# Patient Record
Sex: MALE | Race: WHITE | NOT HISPANIC OR LATINO
[De-identification: names, ages, dates, MRNs, and addresses within clinical notes are randomized per-mention and may not be internally consistent; named-entity substitution may affect disease eponyms.]

---

## 2017-06-13 ENCOUNTER — APPOINTMENT (OUTPATIENT)
Dept: ULTRASOUND IMAGING | Facility: CLINIC | Age: 62
End: 2017-06-13

## 2017-06-17 ENCOUNTER — APPOINTMENT (OUTPATIENT)
Dept: ULTRASOUND IMAGING | Facility: CLINIC | Age: 62
End: 2017-06-17

## 2017-06-17 ENCOUNTER — OUTPATIENT (OUTPATIENT)
Dept: OUTPATIENT SERVICES | Facility: HOSPITAL | Age: 62
LOS: 1 days | End: 2017-06-17
Payer: COMMERCIAL

## 2017-06-17 DIAGNOSIS — Z00.8 ENCOUNTER FOR OTHER GENERAL EXAMINATION: ICD-10-CM

## 2017-06-17 PROCEDURE — 76775 US EXAM ABDO BACK WALL LIM: CPT

## 2017-06-17 PROCEDURE — 76770 US EXAM ABDO BACK WALL COMP: CPT

## 2017-12-27 ENCOUNTER — APPOINTMENT (OUTPATIENT)
Dept: MRI IMAGING | Facility: CLINIC | Age: 62
End: 2017-12-27
Payer: COMMERCIAL

## 2017-12-27 ENCOUNTER — OUTPATIENT (OUTPATIENT)
Dept: OUTPATIENT SERVICES | Facility: HOSPITAL | Age: 62
LOS: 1 days | End: 2017-12-27
Payer: COMMERCIAL

## 2017-12-27 DIAGNOSIS — Z00.8 ENCOUNTER FOR OTHER GENERAL EXAMINATION: ICD-10-CM

## 2017-12-27 PROCEDURE — 72148 MRI LUMBAR SPINE W/O DYE: CPT

## 2017-12-27 PROCEDURE — 72148 MRI LUMBAR SPINE W/O DYE: CPT | Mod: 26

## 2018-01-30 ENCOUNTER — OUTPATIENT (OUTPATIENT)
Dept: OUTPATIENT SERVICES | Facility: HOSPITAL | Age: 63
LOS: 1 days | End: 2018-01-30
Payer: COMMERCIAL

## 2018-01-30 ENCOUNTER — APPOINTMENT (OUTPATIENT)
Dept: RADIOLOGY | Facility: CLINIC | Age: 63
End: 2018-01-30
Payer: COMMERCIAL

## 2018-01-30 DIAGNOSIS — Z00.8 ENCOUNTER FOR OTHER GENERAL EXAMINATION: ICD-10-CM

## 2018-01-30 PROCEDURE — 71046 X-RAY EXAM CHEST 2 VIEWS: CPT | Mod: 26

## 2018-01-30 PROCEDURE — 71046 X-RAY EXAM CHEST 2 VIEWS: CPT

## 2018-06-27 ENCOUNTER — APPOINTMENT (OUTPATIENT)
Dept: DERMATOLOGY | Facility: CLINIC | Age: 63
End: 2018-06-27
Payer: COMMERCIAL

## 2018-06-27 VITALS — HEIGHT: 72 IN | BODY MASS INDEX: 31.83 KG/M2 | WEIGHT: 235 LBS

## 2018-06-27 DIAGNOSIS — Z86.39 PERSONAL HISTORY OF OTHER ENDOCRINE, NUTRITIONAL AND METABOLIC DISEASE: ICD-10-CM

## 2018-06-27 DIAGNOSIS — Z84.0 FAMILY HISTORY OF DISEASES OF THE SKIN AND SUBCUTANEOUS TISSUE: ICD-10-CM

## 2018-06-27 PROCEDURE — 99203 OFFICE O/P NEW LOW 30 MIN: CPT | Mod: 25

## 2018-06-27 PROCEDURE — 11100 BX SKIN SUBCUTANEOUS&/MUCOUS MEMBRANE 1 LESION: CPT

## 2018-06-27 RX ORDER — ATORVASTATIN CALCIUM 10 MG/1
10 TABLET, FILM COATED ORAL
Qty: 90 | Refills: 0 | Status: ACTIVE | COMMUNITY
Start: 2017-09-30

## 2018-06-27 RX ORDER — FENOFIBRATE 130 MG/1
130 CAPSULE ORAL
Qty: 90 | Refills: 0 | Status: ACTIVE | COMMUNITY
Start: 2018-03-10

## 2018-07-25 LAB — CORE LAB BIOPSY: NORMAL

## 2018-09-11 ENCOUNTER — RX RENEWAL (OUTPATIENT)
Age: 63
End: 2018-09-11

## 2018-09-26 ENCOUNTER — RX RENEWAL (OUTPATIENT)
Age: 63
End: 2018-09-26

## 2018-10-18 ENCOUNTER — RECORD ABSTRACTING (OUTPATIENT)
Age: 63
End: 2018-10-18

## 2018-10-18 RX ORDER — ADHESIVE TAPE 3"X 2.3 YD
50 MCG TAPE, NON-MEDICATED TOPICAL DAILY
Refills: 0 | Status: ACTIVE | COMMUNITY

## 2018-10-20 ENCOUNTER — LABORATORY RESULT (OUTPATIENT)
Age: 63
End: 2018-10-20

## 2018-10-23 ENCOUNTER — APPOINTMENT (OUTPATIENT)
Dept: ENDOCRINOLOGY | Facility: CLINIC | Age: 63
End: 2018-10-23
Payer: COMMERCIAL

## 2018-10-23 VITALS
HEIGHT: 72 IN | HEART RATE: 68 BPM | DIASTOLIC BLOOD PRESSURE: 80 MMHG | BODY MASS INDEX: 31.69 KG/M2 | SYSTOLIC BLOOD PRESSURE: 112 MMHG | WEIGHT: 234 LBS

## 2018-10-23 PROCEDURE — 99214 OFFICE O/P EST MOD 30 MIN: CPT

## 2018-12-17 ENCOUNTER — RX RENEWAL (OUTPATIENT)
Age: 63
End: 2018-12-17

## 2019-02-06 ENCOUNTER — RECORD ABSTRACTING (OUTPATIENT)
Age: 64
End: 2019-02-06

## 2019-02-06 DIAGNOSIS — Z78.9 OTHER SPECIFIED HEALTH STATUS: ICD-10-CM

## 2019-02-06 DIAGNOSIS — Z83.49 FAMILY HISTORY OF OTHER ENDOCRINE, NUTRITIONAL AND METABOLIC DISEASES: ICD-10-CM

## 2019-02-18 LAB
ALBUMIN SERPL ELPH-MCNC: 5.1 G/DL
ALP BLD-CCNC: 40 U/L
ALT SERPL-CCNC: 19 U/L
ANION GAP SERPL CALC-SCNC: 12 MMOL/L
AST SERPL-CCNC: 21 U/L
BASOPHILS # BLD AUTO: 0.08 K/UL
BASOPHILS NFR BLD AUTO: 2.2 %
BILIRUB SERPL-MCNC: 0.7 MG/DL
BUN SERPL-MCNC: 18 MG/DL
CALCIUM SERPL-MCNC: 9.8 MG/DL
CHLORIDE SERPL-SCNC: 103 MMOL/L
CHOLEST SERPL-MCNC: 148 MG/DL
CHOLEST/HDLC SERPL: 4.9 RATIO
CO2 SERPL-SCNC: 24 MMOL/L
CREAT SERPL-MCNC: 1.1 MG/DL
CREAT SPEC-SCNC: 118 MG/DL
EOSINOPHIL # BLD AUTO: 0.04 K/UL
EOSINOPHIL NFR BLD AUTO: 1.1 %
GLUCOSE SERPL-MCNC: 107 MG/DL
HBA1C MFR BLD HPLC: 6 %
HCT VFR BLD CALC: 41.6 %
HDLC SERPL-MCNC: 30 MG/DL
HGB BLD-MCNC: 13.4 G/DL
IMM GRANULOCYTES NFR BLD AUTO: 0.5 %
LDLC SERPL CALC-MCNC: 72 MG/DL
LDLC SERPL DIRECT ASSAY-MCNC: 96 MG/DL
LYMPHOCYTES # BLD AUTO: 1.76 K/UL
LYMPHOCYTES NFR BLD AUTO: 47.7 %
MAN DIFF?: NORMAL
MCHC RBC-ENTMCNC: 31 PG
MCHC RBC-ENTMCNC: 32.2 GM/DL
MCV RBC AUTO: 96.3 FL
MICROALBUMIN 24H UR DL<=1MG/L-MCNC: 3.6 MG/DL
MICROALBUMIN/CREAT 24H UR-RTO: 30 MG/G
MONOCYTES # BLD AUTO: 0.53 K/UL
MONOCYTES NFR BLD AUTO: 14.4 %
NEUTROPHILS # BLD AUTO: 1.26 K/UL
NEUTROPHILS NFR BLD AUTO: 34.1 %
PLATELET # BLD AUTO: 196 K/UL
POTASSIUM SERPL-SCNC: 4.2 MMOL/L
PROT SERPL-MCNC: 7.9 G/DL
RBC # BLD: 4.32 M/UL
RBC # FLD: 14.1 %
SODIUM SERPL-SCNC: 139 MMOL/L
TRIGL SERPL-MCNC: 230 MG/DL
WBC # FLD AUTO: 3.69 K/UL

## 2019-02-21 ENCOUNTER — APPOINTMENT (OUTPATIENT)
Dept: ENDOCRINOLOGY | Facility: CLINIC | Age: 64
End: 2019-02-21
Payer: COMMERCIAL

## 2019-02-21 VITALS
WEIGHT: 235 LBS | BODY MASS INDEX: 31.83 KG/M2 | DIASTOLIC BLOOD PRESSURE: 78 MMHG | SYSTOLIC BLOOD PRESSURE: 136 MMHG | HEIGHT: 72 IN | HEART RATE: 61 BPM

## 2019-02-21 PROCEDURE — 99214 OFFICE O/P EST MOD 30 MIN: CPT

## 2019-02-21 NOTE — ASSESSMENT
[Carbohydrate Consistent Diet] : carbohydrate consistent diet [Importance of Diet and Exercise] : importance of diet and exercise to improve glycemic control, achieve weight loss and improve cardiovascular health [Self Monitoring of Blood Glucose] : self monitoring of blood glucose [Levothyroxine] : The patient was instructed to take Levothyroxine on an empty stomach, separate from vitamins, and wait at least 30 minutes before eating [FreeTextEntry1] : Dm 2 well controlled with A1c 5.9\par continue metformin 1000 mg BID \par continue glimepiride 2 mg qd\par check yosvany/ c ratio normal\par bgs 2x day \par discussed diet and exercise\par encouraged continuing exercises gym 4-5 x week \par eye exam Nov 2018: reportedly no DR \par flushot done \par \par HTN:  bp at target on meds. Continue current management.\par \par HLD: LDL at target. \par continue atorvastatin 10 mg qd \par \par obesity:  discussed diet and exercise\par encouraged more exercise\par goes to gym \par \par hypoT : \par continue lt4 88 mcg qd \par check tfts next appt\par discussed administration of Lt4 in am fasting

## 2019-02-21 NOTE — HISTORY OF PRESENT ILLNESS
[FreeTextEntry1] : takes metformin and glimepiride \par goes to gym 3x week \par Bgs excellent \par no hypoG

## 2019-03-18 ENCOUNTER — RX RENEWAL (OUTPATIENT)
Age: 64
End: 2019-03-18

## 2019-05-22 LAB
ALBUMIN SERPL ELPH-MCNC: 4.9 G/DL
ALP BLD-CCNC: 40 U/L
ALT SERPL-CCNC: 21 U/L
ANION GAP SERPL CALC-SCNC: 15 MMOL/L
AST SERPL-CCNC: 26 U/L
BASOPHILS # BLD AUTO: 0.06 K/UL
BASOPHILS NFR BLD AUTO: 1.6 %
BILIRUB SERPL-MCNC: 0.8 MG/DL
BUN SERPL-MCNC: 20 MG/DL
CALCIUM SERPL-MCNC: 9.8 MG/DL
CHLORIDE SERPL-SCNC: 101 MMOL/L
CHOLEST SERPL-MCNC: 138 MG/DL
CHOLEST/HDLC SERPL: 4.8 RATIO
CO2 SERPL-SCNC: 21 MMOL/L
CREAT SERPL-MCNC: 1.25 MG/DL
CREAT SPEC-SCNC: 143 MG/DL
EOSINOPHIL # BLD AUTO: 0.03 K/UL
EOSINOPHIL NFR BLD AUTO: 0.8 %
GLUCOSE SERPL-MCNC: 95 MG/DL
HBA1C MFR BLD HPLC: 5.8 %
HCT VFR BLD CALC: 40.6 %
HDLC SERPL-MCNC: 29 MG/DL
HGB BLD-MCNC: 13 G/DL
IMM GRANULOCYTES NFR BLD AUTO: 0.3 %
LDLC SERPL CALC-MCNC: 68 MG/DL
LDLC SERPL DIRECT ASSAY-MCNC: 89 MG/DL
LYMPHOCYTES # BLD AUTO: 2.16 K/UL
LYMPHOCYTES NFR BLD AUTO: 56 %
MAN DIFF?: NORMAL
MCHC RBC-ENTMCNC: 31 PG
MCHC RBC-ENTMCNC: 32 GM/DL
MCV RBC AUTO: 96.9 FL
MICROALBUMIN 24H UR DL<=1MG/L-MCNC: 4.6 MG/DL
MICROALBUMIN/CREAT 24H UR-RTO: 32 MG/G
MONOCYTES # BLD AUTO: 0.51 K/UL
MONOCYTES NFR BLD AUTO: 13.2 %
NEUTROPHILS # BLD AUTO: 1.09 K/UL
NEUTROPHILS NFR BLD AUTO: 28.1 %
PLATELET # BLD AUTO: 158 K/UL
POTASSIUM SERPL-SCNC: 4.2 MMOL/L
PROT SERPL-MCNC: 8.1 G/DL
RBC # BLD: 4.19 M/UL
RBC # FLD: 13.2 %
SODIUM SERPL-SCNC: 137 MMOL/L
T3 SERPL-MCNC: 81 NG/DL
T4 FREE SERPL-MCNC: 1.5 NG/DL
TRIGL SERPL-MCNC: 204 MG/DL
TSH SERPL-ACNC: 3.55 UIU/ML
WBC # FLD AUTO: 3.86 K/UL

## 2019-05-25 ENCOUNTER — APPOINTMENT (OUTPATIENT)
Dept: ULTRASOUND IMAGING | Facility: CLINIC | Age: 64
End: 2019-05-25
Payer: COMMERCIAL

## 2019-05-25 ENCOUNTER — OUTPATIENT (OUTPATIENT)
Dept: OUTPATIENT SERVICES | Facility: HOSPITAL | Age: 64
LOS: 1 days | End: 2019-05-25
Payer: COMMERCIAL

## 2019-05-25 DIAGNOSIS — Z00.8 ENCOUNTER FOR OTHER GENERAL EXAMINATION: ICD-10-CM

## 2019-05-25 PROCEDURE — 76775 US EXAM ABDO BACK WALL LIM: CPT

## 2019-05-25 PROCEDURE — 76775 US EXAM ABDO BACK WALL LIM: CPT | Mod: 26

## 2019-05-25 PROCEDURE — 76770 US EXAM ABDO BACK WALL COMP: CPT | Mod: 26

## 2019-05-25 PROCEDURE — 76770 US EXAM ABDO BACK WALL COMP: CPT

## 2019-05-29 ENCOUNTER — APPOINTMENT (OUTPATIENT)
Dept: ENDOCRINOLOGY | Facility: CLINIC | Age: 64
End: 2019-05-29
Payer: COMMERCIAL

## 2019-05-29 VITALS
BODY MASS INDEX: 31.56 KG/M2 | WEIGHT: 233 LBS | HEIGHT: 72 IN | SYSTOLIC BLOOD PRESSURE: 124 MMHG | HEART RATE: 59 BPM | DIASTOLIC BLOOD PRESSURE: 86 MMHG

## 2019-05-29 PROCEDURE — 99214 OFFICE O/P EST MOD 30 MIN: CPT

## 2019-05-29 NOTE — PHYSICAL EXAM
[Alert] : alert [No Acute Distress] : no acute distress [Well Nourished] : well nourished [EOMI] : extra ocular movement intact [Normal Sclera/Conjunctiva] : normal sclera/conjunctiva [Well Developed] : well developed [Normal Oropharynx] : the oropharynx was normal [No Proptosis] : no proptosis [Thyroid Not Enlarged] : the thyroid was not enlarged [No Thyroid Nodules] : there were no palpable thyroid nodules [No Respiratory Distress] : no respiratory distress [Clear to Auscultation] : lungs were clear to auscultation bilaterally [No Accessory Muscle Use] : no accessory muscle use [Normal S1, S2] : normal S1 and S2 [Normal Rate] : heart rate was normal  [Pedal Pulses Normal] : the pedal pulses are present [No Edema] : there was no peripheral edema [Regular Rhythm] : with a regular rhythm [Not Tender] : non-tender [Normal Bowel Sounds] : normal bowel sounds [Not Distended] : not distended [Soft] : abdomen soft [Post Cervical Nodes] : posterior cervical nodes [Normal] : normal and non tender [Anterior Cervical Nodes] : anterior cervical nodes [No Stigmata of Cushings Syndrome] : no stigmata of cushings syndrome [Normal Gait] : normal gait [Normal Strength/Tone] : muscle strength and tone were normal [No Rash] : no rash [No Tremors] : no tremors [Normal Reflexes] : deep tendon reflexes were 2+ and symmetric [Oriented x3] : oriented to person, place, and time [Acanthosis Nigricans] : no acanthosis nigricans

## 2019-05-29 NOTE — ASSESSMENT
[Carbohydrate Consistent Diet] : carbohydrate consistent diet [Importance of Diet and Exercise] : importance of diet and exercise to improve glycemic control, achieve weight loss and improve cardiovascular health [Levothyroxine] : The patient was instructed to take Levothyroxine on an empty stomach, separate from vitamins, and wait at least 30 minutes before eating [Self Monitoring of Blood Glucose] : self monitoring of blood glucose [FreeTextEntry1] : Dm 2 well controlled with A1c 5.8\par continue metformin 1000 mg BID \par continue glimepiride 2 mg qd\par yosvany/ c ratio normal\par bgs 2x day \par discussed diet and exercise\par encouraged continuing exercises gym 4-5 x week \par eye exam Nov 2018: reportedly no DR \par GFR normal. \par encouraged to loose 4 lbs \par \par HTN:  bp at target on meds. Continue current management.\par discussed low salt diet \par \par HLD: LDL at target. \par continue atorvastatin 10 mg qd \par \par obesity:  discussed diet and exercise\par encouraged more exercise\par goes to gym \par \par hypoT : \par pt euthyroid clinically and biochemically.\par continue lt4 88 mcg qd\par  discussed administration of Lt4 in am fasting

## 2019-09-16 LAB
ALBUMIN SERPL ELPH-MCNC: 4.8 G/DL
ALP BLD-CCNC: 44 U/L
ALT SERPL-CCNC: 15 U/L
ANION GAP SERPL CALC-SCNC: 18 MMOL/L
AST SERPL-CCNC: 24 U/L
BASOPHILS # BLD AUTO: 0.12 K/UL
BASOPHILS NFR BLD AUTO: 2.5 %
BILIRUB SERPL-MCNC: 0.6 MG/DL
BUN SERPL-MCNC: 21 MG/DL
CALCIUM SERPL-MCNC: 9.7 MG/DL
CHLORIDE SERPL-SCNC: 100 MMOL/L
CHOLEST SERPL-MCNC: 125 MG/DL
CHOLEST/HDLC SERPL: 5 RATIO
CO2 SERPL-SCNC: 20 MMOL/L
CREAT SERPL-MCNC: 1.33 MG/DL
CREAT SPEC-SCNC: 108 MG/DL
EOSINOPHIL # BLD AUTO: 0.02 K/UL
EOSINOPHIL NFR BLD AUTO: 0.4 %
ESTIMATED AVERAGE GLUCOSE: 120 MG/DL
GLUCOSE SERPL-MCNC: 120 MG/DL
HBA1C MFR BLD HPLC: 5.8 %
HCT VFR BLD CALC: 38.4 %
HDLC SERPL-MCNC: 25 MG/DL
HGB BLD-MCNC: 12 G/DL
IMM GRANULOCYTES NFR BLD AUTO: 0.8 %
LDLC SERPL CALC-MCNC: 67 MG/DL
LDLC SERPL DIRECT ASSAY-MCNC: 82 MG/DL
LYMPHOCYTES # BLD AUTO: 1.74 K/UL
LYMPHOCYTES NFR BLD AUTO: 36.1 %
MAN DIFF?: NORMAL
MCHC RBC-ENTMCNC: 30.9 PG
MCHC RBC-ENTMCNC: 31.3 GM/DL
MCV RBC AUTO: 99 FL
MICROALBUMIN 24H UR DL<=1MG/L-MCNC: 7.5 MG/DL
MICROALBUMIN/CREAT 24H UR-RTO: 70 MG/G
MONOCYTES # BLD AUTO: 0.66 K/UL
MONOCYTES NFR BLD AUTO: 13.7 %
NEUTROPHILS # BLD AUTO: 2.24 K/UL
NEUTROPHILS NFR BLD AUTO: 46.5 %
PLATELET # BLD AUTO: 274 K/UL
POTASSIUM SERPL-SCNC: 4.5 MMOL/L
PROT SERPL-MCNC: 8.1 G/DL
RBC # BLD: 3.88 M/UL
RBC # FLD: 14 %
SODIUM SERPL-SCNC: 138 MMOL/L
TRIGL SERPL-MCNC: 166 MG/DL
WBC # FLD AUTO: 4.82 K/UL

## 2019-09-18 ENCOUNTER — MEDICATION RENEWAL (OUTPATIENT)
Age: 64
End: 2019-09-18

## 2019-09-18 ENCOUNTER — APPOINTMENT (OUTPATIENT)
Dept: ENDOCRINOLOGY | Facility: CLINIC | Age: 64
End: 2019-09-18
Payer: COMMERCIAL

## 2019-09-18 VITALS
DIASTOLIC BLOOD PRESSURE: 76 MMHG | SYSTOLIC BLOOD PRESSURE: 120 MMHG | BODY MASS INDEX: 30.61 KG/M2 | HEIGHT: 72 IN | WEIGHT: 226 LBS | HEART RATE: 56 BPM

## 2019-09-18 PROCEDURE — 99214 OFFICE O/P EST MOD 30 MIN: CPT

## 2019-09-18 RX ORDER — BLOOD SUGAR DIAGNOSTIC
STRIP MISCELLANEOUS
Qty: 200 | Refills: 1 | Status: DISCONTINUED | COMMUNITY
Start: 2018-03-23 | End: 2019-09-18

## 2019-09-18 RX ORDER — BLOOD-GLUCOSE METER
KIT MISCELLANEOUS
Qty: 1 | Refills: 0 | Status: ACTIVE | COMMUNITY
Start: 2019-09-18 | End: 1900-01-01

## 2019-09-18 RX ORDER — LEVOFLOXACIN 500 MG/1
500 TABLET, FILM COATED ORAL
Qty: 10 | Refills: 0 | Status: DISCONTINUED | COMMUNITY
Start: 2018-01-30 | End: 2019-09-18

## 2019-09-18 RX ORDER — LANCETS 30 GAUGE
EACH MISCELLANEOUS
Qty: 200 | Refills: 1 | Status: DISCONTINUED | COMMUNITY
Start: 2018-03-23 | End: 2019-09-18

## 2019-09-18 NOTE — PHYSICAL EXAM
[Alert] : alert [No Acute Distress] : no acute distress [Well Developed] : well developed [Well Nourished] : well nourished [Normal Sclera/Conjunctiva] : normal sclera/conjunctiva [EOMI] : extra ocular movement intact [No Proptosis] : no proptosis [Normal Oropharynx] : the oropharynx was normal [Thyroid Not Enlarged] : the thyroid was not enlarged [No Thyroid Nodules] : there were no palpable thyroid nodules [No Respiratory Distress] : no respiratory distress [No Accessory Muscle Use] : no accessory muscle use [Clear to Auscultation] : lungs were clear to auscultation bilaterally [Regular Rhythm] : with a regular rhythm [Normal Rate] : heart rate was normal  [Normal S1, S2] : normal S1 and S2 [Pedal Pulses Normal] : the pedal pulses are present [Normal Bowel Sounds] : normal bowel sounds [No Edema] : there was no peripheral edema [Not Tender] : non-tender [Soft] : abdomen soft [Not Distended] : not distended [No Stigmata of Cushings Syndrome] : no stigmata of cushings syndrome [Normal Gait] : normal gait [Normal Strength/Tone] : muscle strength and tone were normal [No Rash] : no rash [Normal Reflexes] : deep tendon reflexes were 2+ and symmetric [No Tremors] : no tremors [Oriented x3] : oriented to person, place, and time [Acanthosis Nigricans] : no acanthosis nigricans

## 2019-09-18 NOTE — ASSESSMENT
[Long Term Vascular Complications] : long term vascular complications of diabetes [Carbohydrate Consistent Diet] : carbohydrate consistent diet [Importance of Diet and Exercise] : importance of diet and exercise to improve glycemic control, achieve weight loss and improve cardiovascular health [Retinopathy Screening] : Patient was referred to ophthalmology for retinopathy screening [Self Monitoring of Blood Glucose] : self monitoring of blood glucose [Levothyroxine] : The patient was instructed to take Levothyroxine on an empty stomach, separate from vitamins, and wait at least 30 minutes before eating [FreeTextEntry1] : Dm 2 well controlled with A1c 5.8.Pt has alos HTN, HLD, obesity. \par \par Dm2: well controlled. he has hyperG in am 130-135 compared to the rest of the day. He has late dinner. Advised to eat a little earlier. \par all lab results reviewed and discussed with patient. All questions answered\par continue metformin 1000 mg BID \par continue glimepiride 2 mg qd\par yosvany/ c ratio normal\par bgs 2x day \par discussed diet and exercise\par encouraged more exercise walking 30 min 3 x week\par Discussed complications of diabetes at length including MI/CVA/ESRD/dialysis/blindness/amputations/infections\par Needs to try to have more protein for meals\par eye exam Nov 2018: reportedly no DR \par GFR normal. \par flushot recommended \par he goes to gym 3x week \par \par BP: bp at target. Continue current management.\par discussed low salt diet \par \par HLD: LDL at target. \par continue atorvastatin 10 mg qd \par \par obesity:  discussed diet and exercise\par encouraged more exercise\par \par hypoT : \par Take daily in AM on an empty stomach at least 30 minutes before eating or taking other medication.\par pt euthyroid clinically and biochemically.\par increase LT4 88 mcg qd and take 1.5 pill on Sundays. \par repeat TFTs in 3 mos and adjust doses

## 2019-09-26 ENCOUNTER — APPOINTMENT (OUTPATIENT)
Dept: DERMATOLOGY | Facility: CLINIC | Age: 64
End: 2019-09-26
Payer: COMMERCIAL

## 2019-09-26 PROCEDURE — 99213 OFFICE O/P EST LOW 20 MIN: CPT

## 2019-09-26 RX ORDER — VIT A AND D3 IN COD LIVER OIL 1250-135
CAPSULE ORAL
Refills: 0 | Status: DISCONTINUED | COMMUNITY
End: 2019-09-26

## 2019-09-26 NOTE — ASSESSMENT
[FreeTextEntry1] : Complete skin examination is negative for malignancy;\par Continue regular exams; Multiple [place] nevi, also angiomas/SKs;\par Follow up for TBSE in 1 year \par self exams discussed\par

## 2019-09-26 NOTE — HISTORY OF PRESENT ILLNESS
[de-identified] : Pt. presents for skin check;\par No itching, bleeding, growing, changing lesions noted;\par Severity:  mild  \par Modifying factors:  none\par Associated symptoms:  none\par Context:  no association with activity \par Hx multiple nevi;

## 2019-09-26 NOTE — PHYSICAL EXAM
[Full Body Skin Exam Performed] : performed [FreeTextEntry3] : Skin examination performed of the face, neck, trunk, arms, legs; \par The patient is well, alert and oriented, pleasant and cooperative.\par Eyelids, conjunctivae, oral mucosa, digits and nails all normal.  \par No cervical adenopathy.\par \par Normal findings include:\par \par Seborrheic keratoses- largest on R lateral thigh\par Angiomas- some larger lesions on trunk\par Lentigines\par Multiple benign nevi were noted. Some nevi exhibited mildly atypical features, but none were suspicious for malignancy. \par

## 2019-12-30 ENCOUNTER — RX RENEWAL (OUTPATIENT)
Age: 64
End: 2019-12-30

## 2019-12-31 ENCOUNTER — APPOINTMENT (OUTPATIENT)
Dept: RADIOLOGY | Facility: CLINIC | Age: 64
End: 2019-12-31
Payer: COMMERCIAL

## 2019-12-31 ENCOUNTER — OUTPATIENT (OUTPATIENT)
Dept: OUTPATIENT SERVICES | Facility: HOSPITAL | Age: 64
LOS: 1 days | End: 2019-12-31
Payer: COMMERCIAL

## 2019-12-31 ENCOUNTER — RX RENEWAL (OUTPATIENT)
Age: 64
End: 2019-12-31

## 2019-12-31 DIAGNOSIS — Z00.8 ENCOUNTER FOR OTHER GENERAL EXAMINATION: ICD-10-CM

## 2019-12-31 PROCEDURE — 73630 X-RAY EXAM OF FOOT: CPT

## 2019-12-31 PROCEDURE — 73630 X-RAY EXAM OF FOOT: CPT | Mod: 26,RT

## 2020-01-07 LAB
ALBUMIN SERPL ELPH-MCNC: 4.8 G/DL
ALP BLD-CCNC: 36 U/L
ALT SERPL-CCNC: 18 U/L
ANION GAP SERPL CALC-SCNC: 15 MMOL/L
AST SERPL-CCNC: 25 U/L
BASOPHILS # BLD AUTO: 0.07 K/UL
BASOPHILS NFR BLD AUTO: 2 %
BILIRUB SERPL-MCNC: 0.6 MG/DL
BUN SERPL-MCNC: 19 MG/DL
CALCIUM SERPL-MCNC: 9.6 MG/DL
CHLORIDE SERPL-SCNC: 100 MMOL/L
CHOLEST SERPL-MCNC: 137 MG/DL
CHOLEST/HDLC SERPL: 4.9 RATIO
CO2 SERPL-SCNC: 20 MMOL/L
CREAT SERPL-MCNC: 1.19 MG/DL
CREAT SPEC-SCNC: 126 MG/DL
EOSINOPHIL # BLD AUTO: 0.02 K/UL
EOSINOPHIL NFR BLD AUTO: 0.6 %
ESTIMATED AVERAGE GLUCOSE: 120 MG/DL
GLUCOSE SERPL-MCNC: 130 MG/DL
HBA1C MFR BLD HPLC: 5.8 %
HCT VFR BLD CALC: 38.6 %
HDLC SERPL-MCNC: 28 MG/DL
HGB BLD-MCNC: 12.3 G/DL
IMM GRANULOCYTES NFR BLD AUTO: 0.6 %
LDLC SERPL CALC-MCNC: 58 MG/DL
LDLC SERPL DIRECT ASSAY-MCNC: 81 MG/DL
LYMPHOCYTES # BLD AUTO: 1.55 K/UL
LYMPHOCYTES NFR BLD AUTO: 45.2 %
MAN DIFF?: NORMAL
MCHC RBC-ENTMCNC: 31.2 PG
MCHC RBC-ENTMCNC: 31.9 GM/DL
MCV RBC AUTO: 98 FL
MICROALBUMIN 24H UR DL<=1MG/L-MCNC: 3.5 MG/DL
MICROALBUMIN/CREAT 24H UR-RTO: 28 MG/G
MONOCYTES # BLD AUTO: 0.55 K/UL
MONOCYTES NFR BLD AUTO: 16 %
NEUTROPHILS # BLD AUTO: 1.22 K/UL
NEUTROPHILS NFR BLD AUTO: 35.6 %
PLATELET # BLD AUTO: 154 K/UL
POTASSIUM SERPL-SCNC: 4.1 MMOL/L
PROT SERPL-MCNC: 7.7 G/DL
RBC # BLD: 3.94 M/UL
RBC # FLD: 14.3 %
SODIUM SERPL-SCNC: 134 MMOL/L
T3 SERPL-MCNC: 89 NG/DL
T4 FREE SERPL-MCNC: 1.8 NG/DL
TRIGL SERPL-MCNC: 255 MG/DL
TSH SERPL-ACNC: 1.59 UIU/ML
WBC # FLD AUTO: 3.43 K/UL

## 2020-01-08 ENCOUNTER — APPOINTMENT (OUTPATIENT)
Dept: ENDOCRINOLOGY | Facility: CLINIC | Age: 65
End: 2020-01-08

## 2020-01-13 ENCOUNTER — APPOINTMENT (OUTPATIENT)
Dept: ENDOCRINOLOGY | Facility: CLINIC | Age: 65
End: 2020-01-13
Payer: COMMERCIAL

## 2020-01-13 VITALS
DIASTOLIC BLOOD PRESSURE: 70 MMHG | WEIGHT: 233 LBS | BODY MASS INDEX: 31.56 KG/M2 | SYSTOLIC BLOOD PRESSURE: 110 MMHG | HEIGHT: 72 IN

## 2020-01-13 PROCEDURE — 99214 OFFICE O/P EST MOD 30 MIN: CPT

## 2020-01-13 NOTE — ASSESSMENT
[Carbohydrate Consistent Diet] : carbohydrate consistent diet [Importance of Diet and Exercise] : importance of diet and exercise to improve glycemic control, achieve weight loss and improve cardiovascular health [Levothyroxine] : The patient was instructed to take Levothyroxine on an empty stomach, separate from vitamins, and wait at least 30 minutes before eating [FreeTextEntry1] : Dm 2 well controlled with A1c 5.8.Pt has also HTN, HLD, obesity. \par \par Dm2: well controlled. has bgs 120-140 . No hypoG \par all lab results reviewed and discussed with patient. All questions answered\par continue metformin 1000 mg BID \par continue glimepiride 2 mg qd\par yosvany/ c ratio normal\par bgs 2x day \par discussed diet and exercise\par encouraged more exercise walking 30 min 3 x week\par Discussed complications of diabetes at length including MI/CVA/ESRD/dialysis/blindness/amputations/infections\par Needs to try to have more protein for meals\par eye exam Nov 2018: reportedly no DR \par GFR normal. \par flushot done  \par continue to go to gym 3x week \par \par BP: bp at target. Continue current management.\par discussed low salt diet \par \par HLD: LDL at target. \par continue atorvastatin 10 mg qd \par continue fenofibrate \par \par obesity:  discussed diet and exercise\par encouraged more exercise\par gained 7 lbs so advised to loose some weight \par \par hypoT : \par Take daily in AM on an empty stomach at least 30 minutes before eating or taking other medication.\par pt euthyroid clinically and biochemically.\par continue LT4 88 mcg 1.5 pill qd.

## 2020-01-13 NOTE — PHYSICAL EXAM
[Alert] : alert [No Acute Distress] : no acute distress [Well Nourished] : well nourished [Well Developed] : well developed [Normal Sclera/Conjunctiva] : normal sclera/conjunctiva [EOMI] : extra ocular movement intact [No Proptosis] : no proptosis [Normal Oropharynx] : the oropharynx was normal [Thyroid Not Enlarged] : the thyroid was not enlarged [No Thyroid Nodules] : there were no palpable thyroid nodules [No Respiratory Distress] : no respiratory distress [No Accessory Muscle Use] : no accessory muscle use [Clear to Auscultation] : lungs were clear to auscultation bilaterally [Normal Rate] : heart rate was normal  [Normal S1, S2] : normal S1 and S2 [Regular Rhythm] : with a regular rhythm [Pedal Pulses Normal] : the pedal pulses are present [No Edema] : there was no peripheral edema [Not Tender] : non-tender [Normal Bowel Sounds] : normal bowel sounds [Soft] : abdomen soft [Not Distended] : not distended [No Stigmata of Cushings Syndrome] : no stigmata of cushings syndrome [Normal Gait] : normal gait [Normal Strength/Tone] : muscle strength and tone were normal [No Rash] : no rash [No Tremors] : no tremors [Oriented x3] : oriented to person, place, and time [Acanthosis Nigricans] : no acanthosis nigricans

## 2020-04-06 ENCOUNTER — LABORATORY RESULT (OUTPATIENT)
Age: 65
End: 2020-04-06

## 2020-04-13 ENCOUNTER — APPOINTMENT (OUTPATIENT)
Dept: ENDOCRINOLOGY | Facility: CLINIC | Age: 65
End: 2020-04-13
Payer: COMMERCIAL

## 2020-04-13 PROCEDURE — G2012 BRIEF CHECK IN BY MD/QHP: CPT

## 2020-04-25 ENCOUNTER — MESSAGE (OUTPATIENT)
Age: 65
End: 2020-04-25

## 2020-05-07 ENCOUNTER — APPOINTMENT (OUTPATIENT)
Dept: DISASTER EMERGENCY | Facility: CLINIC | Age: 65
End: 2020-05-07

## 2020-05-14 LAB
SARS-COV-2 IGG SERPL IA-ACNC: <0.1 INDEX
SARS-COV-2 IGG SERPL QL IA: NEGATIVE

## 2020-07-11 ENCOUNTER — APPOINTMENT (OUTPATIENT)
Dept: DERMATOLOGY | Facility: CLINIC | Age: 65
End: 2020-07-11
Payer: COMMERCIAL

## 2020-07-11 VITALS — WEIGHT: 230 LBS | BODY MASS INDEX: 31.19 KG/M2

## 2020-07-11 DIAGNOSIS — D48.5 NEOPLASM OF UNCERTAIN BEHAVIOR OF SKIN: ICD-10-CM

## 2020-07-11 PROCEDURE — 11102 TANGNTL BX SKIN SINGLE LES: CPT

## 2020-07-11 PROCEDURE — 99213 OFFICE O/P EST LOW 20 MIN: CPT | Mod: 25

## 2020-07-11 RX ORDER — BLOOD SUGAR DIAGNOSTIC
STRIP MISCELLANEOUS
Qty: 2 | Refills: 0 | Status: DISCONTINUED | COMMUNITY
Start: 2019-09-18 | End: 2020-07-11

## 2020-07-11 RX ORDER — LANCETS 28 GAUGE
EACH MISCELLANEOUS
Qty: 200 | Refills: 0 | Status: DISCONTINUED | COMMUNITY
Start: 2019-09-18 | End: 2020-07-11

## 2020-07-11 NOTE — ASSESSMENT
[FreeTextEntry1] : The patient was instructed to check portal and/or call the office in one week for biopsy results. Prob. traumatized angioma or nevus\par \par Continue regular exams; Multiple [place] nevi, also angiomas/SKs;\par Follow up for TBSE in 1 year \par \par

## 2020-07-11 NOTE — HISTORY OF PRESENT ILLNESS
[de-identified] : Pt. presents for skin check;\par No itching, bleeding, growing, changing lesions noted;\par Severity:  mild  \par Modifying factors:  none\par Associated symptoms:  none\par Context:  no association with activity \par Hx multiple nevi; \par one new lesion on L chest;  has been traumatized;

## 2020-07-11 NOTE — PHYSICAL EXAM
[Full Body Skin Exam Performed] : performed [FreeTextEntry3] : Skin examination performed of the face, neck, trunk, arms, legs; \par The patient is well, alert and oriented, pleasant and cooperative.\par Eyelids, conjunctivae, oral mucosa, digits and nails all normal.  \par No cervical adenopathy.\par \par Normal findings include:\par \par Seborrheic keratoses- largest on R lateral thigh\par Angiomas- some larger lesions on trunk\par Lentigines\par Multiple benign nevi were noted. Some nevi exhibited mildly atypical features, but none were suspicious for malignancy. Large nevus spilus L lower abdomen\par L chest near areola;  crusted erythematous papule\par

## 2020-07-14 LAB
ALBUMIN SERPL ELPH-MCNC: 4.9 G/DL
ALP BLD-CCNC: 36 U/L
ALT SERPL-CCNC: 26 U/L
ANION GAP SERPL CALC-SCNC: 15 MMOL/L
AST SERPL-CCNC: 29 U/L
BASOPHILS # BLD AUTO: 0.09 K/UL
BASOPHILS NFR BLD AUTO: 1.9 %
BILIRUB SERPL-MCNC: 0.8 MG/DL
BUN SERPL-MCNC: 18 MG/DL
CALCIUM SERPL-MCNC: 9.6 MG/DL
CHLORIDE SERPL-SCNC: 102 MMOL/L
CHOLEST SERPL-MCNC: 127 MG/DL
CHOLEST/HDLC SERPL: 5.1 RATIO
CO2 SERPL-SCNC: 22 MMOL/L
CREAT SERPL-MCNC: 1.33 MG/DL
CREAT SPEC-SCNC: 146 MG/DL
EOSINOPHIL # BLD AUTO: 0.02 K/UL
EOSINOPHIL NFR BLD AUTO: 0.4 %
ESTIMATED AVERAGE GLUCOSE: 123 MG/DL
GLUCOSE SERPL-MCNC: 127 MG/DL
HBA1C MFR BLD HPLC: 5.9 %
HCT VFR BLD CALC: 38.5 %
HDLC SERPL-MCNC: 25 MG/DL
HGB BLD-MCNC: 12.7 G/DL
IMM GRANULOCYTES NFR BLD AUTO: 0.6 %
LDLC SERPL CALC-MCNC: 45 MG/DL
LDLC SERPL DIRECT ASSAY-MCNC: 69 MG/DL
LYMPHOCYTES # BLD AUTO: 1.76 K/UL
LYMPHOCYTES NFR BLD AUTO: 38.1 %
MAN DIFF?: NORMAL
MCHC RBC-ENTMCNC: 31.8 PG
MCHC RBC-ENTMCNC: 33 GM/DL
MCV RBC AUTO: 96.5 FL
MICROALBUMIN 24H UR DL<=1MG/L-MCNC: 2.5 MG/DL
MICROALBUMIN/CREAT 24H UR-RTO: 17 MG/G
MONOCYTES # BLD AUTO: 0.7 K/UL
MONOCYTES NFR BLD AUTO: 15.2 %
NEUTROPHILS # BLD AUTO: 2.02 K/UL
NEUTROPHILS NFR BLD AUTO: 43.8 %
PLATELET # BLD AUTO: 145 K/UL
POTASSIUM SERPL-SCNC: 4.5 MMOL/L
PROT SERPL-MCNC: 7.6 G/DL
RBC # BLD: 3.99 M/UL
RBC # FLD: 13.5 %
SODIUM SERPL-SCNC: 138 MMOL/L
T4 FREE SERPL-MCNC: 1.9 NG/DL
TRIGL SERPL-MCNC: 284 MG/DL
TSH SERPL-ACNC: 1.83 UIU/ML
WBC # FLD AUTO: 4.62 K/UL

## 2020-07-16 LAB — CORE LAB BIOPSY: NORMAL

## 2020-07-23 ENCOUNTER — APPOINTMENT (OUTPATIENT)
Dept: ENDOCRINOLOGY | Facility: CLINIC | Age: 65
End: 2020-07-23
Payer: COMMERCIAL

## 2020-07-23 VITALS
DIASTOLIC BLOOD PRESSURE: 82 MMHG | HEIGHT: 72 IN | BODY MASS INDEX: 31.15 KG/M2 | SYSTOLIC BLOOD PRESSURE: 122 MMHG | WEIGHT: 230 LBS

## 2020-07-23 PROCEDURE — 99214 OFFICE O/P EST MOD 30 MIN: CPT

## 2020-07-23 NOTE — ASSESSMENT
[Importance of Diet and Exercise] : importance of diet and exercise to improve glycemic control, achieve weight loss and improve cardiovascular health [Exercise/Effect on Glucose] : exercise/effect on glucose [Retinopathy Screening] : Patient was referred to ophthalmology for retinopathy screening [Levothyroxine] : The patient was instructed to take Levothyroxine on an empty stomach, separate from vitamins, and wait at least 30 minutes before eating [FreeTextEntry1] : Dm 2 well controlled with A1c 5.9. Pt has also HTN, HLD, obesity. \par \par Dm2: bgs 115-130. \par continue metformin 1000 mg BID \par continue glimepiride 2 mg qd\par discussed benefits and side effects of GLp1 and could be a good option for him for CV benefits and weight loss. \par yosvany/ c ratio normal\par bgs 2x day \par encouraged more exercise walking 30 min 3 x week because he is a little sedendtary at work now. \par Discussed complications of diabetes at length including MI/CVA/ESRD/dialysis/blindness/amputations/infections\par encouraged to include low carb meals and more proteins \par eye exam July : no DR  \par GFR normal. \par \par BP:  bp at target on meds. Continue current management.\par I advised low fat/low cholesterol diet, low salt diet, and weight loss\par \par HLD: LDL at target.\par low fat/low cholesterol diet and weight loss advised\par continue atorvastatin 10 mg qd and continue fenofibrate \par \par obesity:  encouraged more exercise\par gained 7 lbs so advised to loose some weight \par \par hypoT : pt euthyroid clinically and biochemically.\par Take daily in AM on an empty stomach at least 30 minutes before eating or taking other medication.\par continue Lt4 125 mcg qd   \par \par Low platelets level: under investigation along with mild anemia and low WBC.

## 2020-07-23 NOTE — PHYSICAL EXAM
[Well Nourished] : well nourished [Alert] : alert [Well Developed] : well developed [No Acute Distress] : no acute distress [Normal Sclera/Conjunctiva] : normal sclera/conjunctiva [EOMI] : extra ocular movement intact [Thyroid Not Enlarged] : the thyroid was not enlarged [Normal Oropharynx] : the oropharynx was normal [No Proptosis] : no proptosis [No Thyroid Nodules] : no palpable thyroid nodules [No Respiratory Distress] : no respiratory distress [Normal S1, S2] : normal S1 and S2 [No Accessory Muscle Use] : no accessory muscle use [Clear to Auscultation] : lungs were clear to auscultation bilaterally [Regular Rhythm] : with a regular rhythm [Normal Rate] : heart rate was normal [No Edema] : no peripheral edema [Pedal Pulses Normal] : the pedal pulses are present [Normal Bowel Sounds] : normal bowel sounds [Not Tender] : non-tender [Normal Anterior Cervical Nodes] : no anterior cervical lymphadenopathy [Soft] : abdomen soft [Not Distended] : not distended [Normal Posterior Cervical Nodes] : no posterior cervical lymphadenopathy [Spine Straight] : spine straight [No Rash] : no rash [Normal Gait] : normal gait [No Tremors] : no tremors [Oriented x3] : oriented to person, place, and time [Acanthosis Nigricans] : no acanthosis nigricans

## 2020-07-29 ENCOUNTER — OUTPATIENT (OUTPATIENT)
Dept: OUTPATIENT SERVICES | Facility: HOSPITAL | Age: 65
LOS: 1 days | Discharge: HOME | End: 2020-07-29

## 2020-07-29 DIAGNOSIS — D64.9 ANEMIA, UNSPECIFIED: ICD-10-CM

## 2020-07-30 ENCOUNTER — RESULT REVIEW (OUTPATIENT)
Age: 65
End: 2020-07-30

## 2020-07-30 ENCOUNTER — APPOINTMENT (OUTPATIENT)
Dept: HEMATOLOGY ONCOLOGY | Facility: CLINIC | Age: 65
End: 2020-07-30
Payer: COMMERCIAL

## 2020-07-30 VITALS
HEIGHT: 72 IN | DIASTOLIC BLOOD PRESSURE: 85 MMHG | OXYGEN SATURATION: 98 % | HEART RATE: 68 BPM | WEIGHT: 231.04 LBS | BODY MASS INDEX: 31.29 KG/M2 | SYSTOLIC BLOOD PRESSURE: 135 MMHG

## 2020-07-30 LAB
ANISOCYTOSIS BLD QL: SLIGHT — SIGNIFICANT CHANGE UP
BASOPHILS # BLD AUTO: 0.1 K/UL — SIGNIFICANT CHANGE UP (ref 0–0.2)
BASOPHILS NFR BLD AUTO: 2 % — SIGNIFICANT CHANGE UP (ref 0–2)
EOSINOPHIL # BLD AUTO: 0.1 K/UL — SIGNIFICANT CHANGE UP (ref 0–0.5)
HCT VFR BLD CALC: 37.2 % — LOW (ref 39–50)
HGB BLD-MCNC: 12.9 G/DL — LOW (ref 13–17)
LG PLATELETS BLD QL AUTO: SLIGHT — SIGNIFICANT CHANGE UP
LYMPHOCYTES # BLD AUTO: 1.7 K/UL — SIGNIFICANT CHANGE UP (ref 1–3.3)
LYMPHOCYTES # BLD AUTO: 26 % — SIGNIFICANT CHANGE UP (ref 13–44)
MACROCYTES BLD QL: SLIGHT — SIGNIFICANT CHANGE UP
MCHC RBC-ENTMCNC: 31.8 PG — SIGNIFICANT CHANGE UP (ref 27–34)
MCHC RBC-ENTMCNC: 34.7 G/DL — SIGNIFICANT CHANGE UP (ref 32–36)
MCV RBC AUTO: 91.8 FL — SIGNIFICANT CHANGE UP (ref 80–100)
MICROCYTES BLD QL: SLIGHT — SIGNIFICANT CHANGE UP
MONOCYTES # BLD AUTO: 1.2 K/UL — HIGH (ref 0–0.9)
MONOCYTES NFR BLD AUTO: 15 % — HIGH (ref 2–14)
NEUTROPHILS # BLD AUTO: 3.7 K/UL — SIGNIFICANT CHANGE UP (ref 1.8–7.4)
NEUTROPHILS NFR BLD AUTO: 57 % — SIGNIFICANT CHANGE UP (ref 43–77)
PLAT MORPH BLD: NORMAL — SIGNIFICANT CHANGE UP
PLATELET # BLD AUTO: 178 K/UL — SIGNIFICANT CHANGE UP (ref 150–400)
POIKILOCYTOSIS BLD QL AUTO: SLIGHT — SIGNIFICANT CHANGE UP
POLYCHROMASIA BLD QL SMEAR: SLIGHT — SIGNIFICANT CHANGE UP
RBC # BLD: 4.05 M/UL — LOW (ref 4.2–5.8)
RBC # FLD: 12.2 % — SIGNIFICANT CHANGE UP (ref 10.3–14.5)
RBC BLD AUTO: SIGNIFICANT CHANGE UP
SMUDGE CELLS # BLD: PRESENT — SIGNIFICANT CHANGE UP
WBC # BLD: 6.7 K/UL — SIGNIFICANT CHANGE UP (ref 3.8–10.5)
WBC # FLD AUTO: 6.7 K/UL — SIGNIFICANT CHANGE UP (ref 3.8–10.5)

## 2020-07-30 PROCEDURE — 99203 OFFICE O/P NEW LOW 30 MIN: CPT

## 2020-07-30 NOTE — ASSESSMENT
[FreeTextEntry1] : CBC is essentially normal, with a very mild decline in hemoglobin associated with stage III renal insufficiency.\par No further work-up or therapy is required.

## 2020-07-30 NOTE — HISTORY OF PRESENT ILLNESS
[de-identified] : This 64-year-old male was referred for evaluation of anemia, neutropenia and lymphocytosis.  In January of this year his white count was 3.4, hemoglobin 12.3, and the differential showed 35 neutrophils 45, lymphocytes and 16 monocytes.\par \par Today's CBC shows a white count of 6.7, hemoglobin 12.9 g, platelets of 178,000.  Differential shows 57 neutrophils 26 lymphocytes 15 monocytes and 2 basophils.\par \par B12, folate and ferritin levels are all normal.  BUN is 18 and creatinine 1.33 corresponding to an EGFR of 55 mL/min.

## 2020-07-31 LAB
FERRITIN SERPL-MCNC: 233 NG/ML
FOLATE SERPL-MCNC: 10.6 NG/ML
IRON SATN MFR SERPL: 14 %
IRON SERPL-MCNC: 58 UG/DL
RBC # BLD: 3.96 M/UL
RETICS # AUTO: 2.8 %
RETICS AGGREG/RBC NFR: 109.7 K/UL
TIBC SERPL-MCNC: 397 UG/DL
UIBC SERPL-MCNC: 340 UG/DL
VIT B12 SERPL-MCNC: 454 PG/ML

## 2020-09-23 ENCOUNTER — APPOINTMENT (OUTPATIENT)
Dept: DERMATOLOGY | Facility: CLINIC | Age: 65
End: 2020-09-23

## 2020-11-11 LAB
ALBUMIN SERPL ELPH-MCNC: 4.9 G/DL
ALP BLD-CCNC: 41 U/L
ALT SERPL-CCNC: 26 U/L
ANION GAP SERPL CALC-SCNC: 13 MMOL/L
AST SERPL-CCNC: 26 U/L
BASOPHILS # BLD AUTO: 0.07 K/UL
BASOPHILS NFR BLD AUTO: 1.9 %
BILIRUB SERPL-MCNC: 0.6 MG/DL
BUN SERPL-MCNC: 18 MG/DL
CALCIUM SERPL-MCNC: 9.5 MG/DL
CHLORIDE SERPL-SCNC: 103 MMOL/L
CHOLEST SERPL-MCNC: 144 MG/DL
CO2 SERPL-SCNC: 22 MMOL/L
CREAT SERPL-MCNC: 1.21 MG/DL
CREAT SPEC-SCNC: 149 MG/DL
EOSINOPHIL # BLD AUTO: 0.03 K/UL
EOSINOPHIL NFR BLD AUTO: 0.8 %
ESTIMATED AVERAGE GLUCOSE: 137 MG/DL
GLUCOSE SERPL-MCNC: 135 MG/DL
HBA1C MFR BLD HPLC: 6.4 %
HCT VFR BLD CALC: 40.9 %
HDLC SERPL-MCNC: 26 MG/DL
HGB BLD-MCNC: 13 G/DL
IMM GRANULOCYTES NFR BLD AUTO: 0.8 %
LDLC SERPL CALC-MCNC: 54 MG/DL
LDLC SERPL DIRECT ASSAY-MCNC: 70 MG/DL
LYMPHOCYTES # BLD AUTO: 1.78 K/UL
LYMPHOCYTES NFR BLD AUTO: 49.3 %
MAN DIFF?: NORMAL
MCHC RBC-ENTMCNC: 31.8 GM/DL
MCHC RBC-ENTMCNC: 32 PG
MCV RBC AUTO: 100.7 FL
MICROALBUMIN 24H UR DL<=1MG/L-MCNC: 5.7 MG/DL
MICROALBUMIN/CREAT 24H UR-RTO: 38 MG/G
MONOCYTES # BLD AUTO: 0.51 K/UL
MONOCYTES NFR BLD AUTO: 14.1 %
NEUTROPHILS # BLD AUTO: 1.19 K/UL
NEUTROPHILS NFR BLD AUTO: 33.1 %
NONHDLC SERPL-MCNC: 118 MG/DL
PLATELET # BLD AUTO: 163 K/UL
POTASSIUM SERPL-SCNC: 4.7 MMOL/L
PROT SERPL-MCNC: 7.5 G/DL
RBC # BLD: 4.06 M/UL
RBC # FLD: 13.8 %
SODIUM SERPL-SCNC: 138 MMOL/L
T4 FREE SERPL-MCNC: 1.7 NG/DL
TRIGL SERPL-MCNC: 321 MG/DL
TSH SERPL-ACNC: 1.62 UIU/ML
WBC # FLD AUTO: 3.61 K/UL

## 2020-11-16 ENCOUNTER — APPOINTMENT (OUTPATIENT)
Dept: ENDOCRINOLOGY | Facility: CLINIC | Age: 65
End: 2020-11-16
Payer: COMMERCIAL

## 2020-11-16 VITALS
BODY MASS INDEX: 31.83 KG/M2 | HEIGHT: 72 IN | WEIGHT: 235 LBS | SYSTOLIC BLOOD PRESSURE: 120 MMHG | DIASTOLIC BLOOD PRESSURE: 80 MMHG

## 2020-11-16 VITALS — TEMPERATURE: 95.9 F

## 2020-11-16 DIAGNOSIS — E66.9 OBESITY, UNSPECIFIED: ICD-10-CM

## 2020-11-16 PROCEDURE — 99072 ADDL SUPL MATRL&STAF TM PHE: CPT

## 2020-11-16 PROCEDURE — 99214 OFFICE O/P EST MOD 30 MIN: CPT

## 2020-11-16 RX ORDER — LEVOTHYROXINE SODIUM 0.09 MG/1
88 TABLET ORAL
Qty: 135 | Refills: 1 | Status: DISCONTINUED | COMMUNITY
Start: 2018-06-02 | End: 2020-11-16

## 2020-11-16 NOTE — PHYSICAL EXAM
[Alert] : alert [Well Nourished] : well nourished [No Acute Distress] : no acute distress [Well Developed] : well developed [Normal Sclera/Conjunctiva] : normal sclera/conjunctiva [EOMI] : extra ocular movement intact [No Proptosis] : no proptosis [Normal Oropharynx] : the oropharynx was normal [Thyroid Not Enlarged] : the thyroid was not enlarged [No Thyroid Nodules] : no palpable thyroid nodules [No Respiratory Distress] : no respiratory distress [No Accessory Muscle Use] : no accessory muscle use [Clear to Auscultation] : lungs were clear to auscultation bilaterally [Normal S1, S2] : normal S1 and S2 [Normal Rate] : heart rate was normal [Regular Rhythm] : with a regular rhythm [No Edema] : no peripheral edema [Pedal Pulses Normal] : the pedal pulses are present [Normal Bowel Sounds] : normal bowel sounds [Not Tender] : non-tender [Not Distended] : not distended [Soft] : abdomen soft [Normal Anterior Cervical Nodes] : no anterior cervical lymphadenopathy [Spine Straight] : spine straight [Normal Gait] : normal gait [No Rash] : no rash [No Tremors] : no tremors [Oriented x3] : oriented to person, place, and time [Acanthosis Nigricans] : no acanthosis nigricans

## 2020-11-16 NOTE — ASSESSMENT
[Importance of Diet and Exercise] : importance of diet and exercise to improve glycemic control, achieve weight loss and improve cardiovascular health [Exercise/Effect on Glucose] : exercise/effect on glucose [FreeTextEntry1] : Dm 2 well controlled with A1c  6.4.  Pt has also HTN, HLD, obesity. \par Patient gained 4 lbs lately due to overeating and A1c worse 6.4. \par \par Dm2: bgs\par continue metformin 1000 mg BID \par continue glimepiride 2 mg qd\par discussed benefits and side effects of GLp1 but he has not decided yet.  \par bgs 2x day \par discussed diet and exercise\par encouraged more exercise walking 30 min 3 x week\par Needs to try to have more protein for meals\par Importance of blood glucose monitoring discussed. Targets reviewed. Recommended pt try to keep blood glucose level below 130 (110) before meals and below 160 (140) two hours after meals.\par ADA diet (30-50 gm carbohydrates with each meal, 15 grams with snacks. Balance with lean proteins and low fat diet.) Exercise daily per ability, work up to 30 minutes a day. Medication, risks and benefits reviewed.\par eye exam July 2020: no DR  \par flushot done \par CKD stage III : monitor. GFr good and stable. \par \par BP:  bp at target on meds. Continue current management.\par I advised low fat/low cholesterol diet, low salt diet, and weight loss\par \par HLD: LDL at target.\par low fat/low cholesterol diet and weight loss advised\par continue atorvastatin 10 mg qd and continue fenofibrate \par \par obesity:  encouraged more exercise\par \par hypoT : pt euthyroid clinically and biochemically.\par continue Lt4 125 mcg qd   \par Take daily in AM on an empty stomach at least 30 minutes before eating or taking other medication.\par \par Anemia: seen by hematology and felt it was secondary to CKD.

## 2020-11-23 ENCOUNTER — TRANSCRIPTION ENCOUNTER (OUTPATIENT)
Age: 65
End: 2020-11-23

## 2021-03-15 ENCOUNTER — APPOINTMENT (OUTPATIENT)
Dept: ENDOCRINOLOGY | Facility: CLINIC | Age: 66
End: 2021-03-15

## 2021-04-12 ENCOUNTER — RX RENEWAL (OUTPATIENT)
Age: 66
End: 2021-04-12

## 2021-05-21 ENCOUNTER — RX RENEWAL (OUTPATIENT)
Age: 66
End: 2021-05-21

## 2021-07-02 ENCOUNTER — RX RENEWAL (OUTPATIENT)
Age: 66
End: 2021-07-02

## 2021-07-16 ENCOUNTER — APPOINTMENT (OUTPATIENT)
Dept: DERMATOLOGY | Facility: CLINIC | Age: 66
End: 2021-07-16
Payer: COMMERCIAL

## 2021-07-16 PROCEDURE — 99213 OFFICE O/P EST LOW 20 MIN: CPT

## 2021-07-16 PROCEDURE — 99072 ADDL SUPL MATRL&STAF TM PHE: CPT

## 2021-07-16 NOTE — PHYSICAL EXAM
[Full Body Skin Exam Performed] : performed [FreeTextEntry3] : Skin examination performed of the face, neck, trunk, arms, legs; \par The patient is well, alert and oriented, pleasant and cooperative.\par Eyelids, conjunctivae, oral mucosa, digits and nails all normal.  \par No cervical adenopathy.\par \par Normal findings include:\par \par Seborrheic keratoses- largest on R lateral thigh\par Angiomas- some larger lesions on trunk\par Lentigines\par Multiple benign nevi were noted. Some nevi exhibited mildly atypical features, but none were suspicious for malignancy. Large nevus spilus L lower abdomen- large, dark, regular compound nevus L lower back\par \par No lesions suspicious for malignancy.

## 2021-07-16 NOTE — HISTORY OF PRESENT ILLNESS
[de-identified] : Pt. presents for skin check;\par c/o few spots of concern;  \par Severity:  mild  \par Modifying factors:  none\par Associated symptoms:  none\par Context:  no association with activity\par Hx multiple nevi; \par

## 2021-07-16 NOTE — ASSESSMENT
[FreeTextEntry1] : Complete skin examination is negative for malignancy; Multiple new concerns were addressed and discussed.\par Therapeutic options and their risks and benefits; along with multiple diagnostic possibilities were discussed at length;\par risks and benefits of skin biopsy and/or other further study were discussed;\par \par Continue regular exams; Multiple [place] nevi, also angiomas/SKs;\par Follow up for TBSE in 1 year \par \par

## 2021-08-02 LAB
ALBUMIN SERPL ELPH-MCNC: 5 G/DL
ALP BLD-CCNC: 41 U/L
ALT SERPL-CCNC: 20 U/L
ANION GAP SERPL CALC-SCNC: 14 MMOL/L
AST SERPL-CCNC: 24 U/L
BASOPHILS # BLD AUTO: 0.08 K/UL
BASOPHILS NFR BLD AUTO: 2.1 %
BILIRUB SERPL-MCNC: 0.9 MG/DL
BUN SERPL-MCNC: 17 MG/DL
CALCIUM SERPL-MCNC: 10.1 MG/DL
CHLORIDE SERPL-SCNC: 100 MMOL/L
CHOLEST SERPL-MCNC: 141 MG/DL
CO2 SERPL-SCNC: 22 MMOL/L
CREAT SERPL-MCNC: 1.25 MG/DL
CREAT SPEC-SCNC: 151 MG/DL
EOSINOPHIL # BLD AUTO: 0.03 K/UL
EOSINOPHIL NFR BLD AUTO: 0.8 %
ESTIMATED AVERAGE GLUCOSE: 131 MG/DL
GLUCOSE SERPL-MCNC: 151 MG/DL
HBA1C MFR BLD HPLC: 6.2 %
HCT VFR BLD CALC: 39.9 %
HDLC SERPL-MCNC: 25 MG/DL
HGB BLD-MCNC: 13 G/DL
IMM GRANULOCYTES NFR BLD AUTO: 0.8 %
LDLC SERPL CALC-MCNC: 57 MG/DL
LDLC SERPL DIRECT ASSAY-MCNC: 73 MG/DL
LYMPHOCYTES # BLD AUTO: 1.77 K/UL
LYMPHOCYTES NFR BLD AUTO: 47.5 %
MAN DIFF?: NORMAL
MCHC RBC-ENTMCNC: 31.5 PG
MCHC RBC-ENTMCNC: 32.6 GM/DL
MCV RBC AUTO: 96.6 FL
MICROALBUMIN 24H UR DL<=1MG/L-MCNC: 5 MG/DL
MICROALBUMIN/CREAT 24H UR-RTO: 33 MG/G
MONOCYTES # BLD AUTO: 0.59 K/UL
MONOCYTES NFR BLD AUTO: 15.8 %
NEUTROPHILS # BLD AUTO: 1.23 K/UL
NEUTROPHILS NFR BLD AUTO: 33 %
NONHDLC SERPL-MCNC: 116 MG/DL
PLATELET # BLD AUTO: 189 K/UL
POTASSIUM SERPL-SCNC: 4.5 MMOL/L
PROT SERPL-MCNC: 7.7 G/DL
RBC # BLD: 4.13 M/UL
RBC # FLD: 13.9 %
SODIUM SERPL-SCNC: 136 MMOL/L
T3 SERPL-MCNC: 97 NG/DL
T4 FREE SERPL-MCNC: 1.9 NG/DL
TRIGL SERPL-MCNC: 294 MG/DL
TSH SERPL-ACNC: 2.19 UIU/ML
WBC # FLD AUTO: 3.73 K/UL

## 2021-08-09 ENCOUNTER — APPOINTMENT (OUTPATIENT)
Dept: ENDOCRINOLOGY | Facility: CLINIC | Age: 66
End: 2021-08-09
Payer: COMMERCIAL

## 2021-08-09 ENCOUNTER — RX RENEWAL (OUTPATIENT)
Age: 66
End: 2021-08-09

## 2021-08-09 VITALS
OXYGEN SATURATION: 99 % | HEART RATE: 72 BPM | HEIGHT: 72 IN | WEIGHT: 229 LBS | SYSTOLIC BLOOD PRESSURE: 126 MMHG | DIASTOLIC BLOOD PRESSURE: 78 MMHG | BODY MASS INDEX: 31.02 KG/M2

## 2021-08-09 PROCEDURE — 99214 OFFICE O/P EST MOD 30 MIN: CPT

## 2021-08-09 NOTE — ASSESSMENT
[Carbohydrate Consistent Diet] : carbohydrate consistent diet [Exercise/Effect on Glucose] : exercise/effect on glucose [Self Monitoring of Blood Glucose] : self monitoring of blood glucose [FreeTextEntry1] : Dm 2 well controlled with A1c  6.4.  Pt has also HTN, HLD, obesity.  a1c 6.2 \par \par Dm2: bgs excellent. He lost 6 lbs due to cutting down on portion.\par continue metformin 1000 mg BID \par continue glimepiride 2 mg qd\par discussed benefits and side effects of GLp1 but he has not decided yet.  \par bgs 2x day \par discussed diet and exercise\par encouraged more exercise walking 30 min 3 x week\par Needs to try to have more protein for meals\par UTD with eye exam \par CKD stage III : monitor. GFr  stable. \par \par BP:  bp at target on meds. Continue current management.\par I advised low fat/low cholesterol diet, low salt diet, and weight loss\par \par HLD: LDL at target.\par low fat/low cholesterol diet and weight loss advised\par continue atorvastatin 10 mg qd and continue fenofibrate \par \par obesity:  lost 6 lbs. encouraged more exercise\par \par hypoT : pt euthyroid clinically and biochemically.\par continue Lt4 125 mcg qd   \par Take daily in AM on an empty stomach at least 30 minutes before eating or taking other medication.\par L thyroid nodule palpated / neck mass??? \par check thyroid US to evaluate architecture\par \par \par Anemia: seen by hematology and felt it was secondary to CKD.

## 2021-08-09 NOTE — PHYSICAL EXAM
[Alert] : alert [Well Nourished] : well nourished [No Acute Distress] : no acute distress [Well Developed] : well developed [Normal Sclera/Conjunctiva] : normal sclera/conjunctiva [EOMI] : extra ocular movement intact [No Proptosis] : no proptosis [Normal Oropharynx] : the oropharynx was normal [No Respiratory Distress] : no respiratory distress [No Accessory Muscle Use] : no accessory muscle use [Clear to Auscultation] : lungs were clear to auscultation bilaterally [Normal S1, S2] : normal S1 and S2 [Normal Rate] : heart rate was normal [Regular Rhythm] : with a regular rhythm [No Edema] : no peripheral edema [Pedal Pulses Normal] : the pedal pulses are present [Normal Bowel Sounds] : normal bowel sounds [Not Tender] : non-tender [Not Distended] : not distended [Soft] : abdomen soft [Normal Anterior Cervical Nodes] : no anterior cervical lymphadenopathy [No Tremors] : no tremors [Oriented x3] : oriented to person, place, and time [Acanthosis Nigricans] : no acanthosis nigricans [de-identified] : L thyroid nodule palpated

## 2021-08-12 ENCOUNTER — APPOINTMENT (OUTPATIENT)
Dept: ULTRASOUND IMAGING | Facility: CLINIC | Age: 66
End: 2021-08-12
Payer: COMMERCIAL

## 2021-08-12 ENCOUNTER — OUTPATIENT (OUTPATIENT)
Dept: OUTPATIENT SERVICES | Facility: HOSPITAL | Age: 66
LOS: 1 days | End: 2021-08-12
Payer: COMMERCIAL

## 2021-08-12 DIAGNOSIS — E05.10 THYROTOXICOSIS WITH TOXIC SINGLE THYROID NODULE WITHOUT THYROTOXIC CRISIS OR STORM: ICD-10-CM

## 2021-08-12 PROCEDURE — 76536 US EXAM OF HEAD AND NECK: CPT | Mod: 26

## 2021-08-12 PROCEDURE — 76536 US EXAM OF HEAD AND NECK: CPT

## 2021-08-18 ENCOUNTER — NON-APPOINTMENT (OUTPATIENT)
Age: 66
End: 2021-08-18

## 2021-10-05 ENCOUNTER — RX RENEWAL (OUTPATIENT)
Age: 66
End: 2021-10-05

## 2022-03-08 ENCOUNTER — RX RENEWAL (OUTPATIENT)
Age: 67
End: 2022-03-08

## 2022-03-14 LAB
ALBUMIN SERPL ELPH-MCNC: 5.1 G/DL
ALP BLD-CCNC: 43 U/L
ALT SERPL-CCNC: 26 U/L
ANION GAP SERPL CALC-SCNC: 17 MMOL/L
AST SERPL-CCNC: 31 U/L
BASOPHILS # BLD AUTO: 0.08 K/UL
BASOPHILS NFR BLD AUTO: 2.2 %
BILIRUB SERPL-MCNC: 0.8 MG/DL
BUN SERPL-MCNC: 14 MG/DL
CALCIUM SERPL-MCNC: 9.7 MG/DL
CHLORIDE SERPL-SCNC: 102 MMOL/L
CHOLEST SERPL-MCNC: 128 MG/DL
CO2 SERPL-SCNC: 19 MMOL/L
CREAT SERPL-MCNC: 1.07 MG/DL
CREAT SPEC-SCNC: 130 MG/DL
EGFR: 76 ML/MIN/1.73M2
EOSINOPHIL # BLD AUTO: 0.02 K/UL
EOSINOPHIL NFR BLD AUTO: 0.5 %
ESTIMATED AVERAGE GLUCOSE: 137 MG/DL
GLUCOSE SERPL-MCNC: 121 MG/DL
HBA1C MFR BLD HPLC: 6.4 %
HCT VFR BLD CALC: 40.3 %
HDLC SERPL-MCNC: 24 MG/DL
HGB BLD-MCNC: 12.8 G/DL
IMM GRANULOCYTES NFR BLD AUTO: 1.1 %
LDLC SERPL CALC-MCNC: 52 MG/DL
LDLC SERPL DIRECT ASSAY-MCNC: 66 MG/DL
LYMPHOCYTES # BLD AUTO: 1.83 K/UL
LYMPHOCYTES NFR BLD AUTO: 49.2 %
MAN DIFF?: NORMAL
MCHC RBC-ENTMCNC: 30.8 PG
MCHC RBC-ENTMCNC: 31.8 GM/DL
MCV RBC AUTO: 97.1 FL
MICROALBUMIN 24H UR DL<=1MG/L-MCNC: 5.5 MG/DL
MICROALBUMIN/CREAT 24H UR-RTO: 42 MG/G
MONOCYTES # BLD AUTO: 0.51 K/UL
MONOCYTES NFR BLD AUTO: 13.7 %
NEUTROPHILS # BLD AUTO: 1.24 K/UL
NEUTROPHILS NFR BLD AUTO: 33.3 %
NONHDLC SERPL-MCNC: 104 MG/DL
PLATELET # BLD AUTO: 171 K/UL
POTASSIUM SERPL-SCNC: 4.5 MMOL/L
PROT SERPL-MCNC: 7.7 G/DL
RBC # BLD: 4.15 M/UL
RBC # FLD: 14 %
SODIUM SERPL-SCNC: 138 MMOL/L
T4 FREE SERPL-MCNC: 1.7 NG/DL
TRIGL SERPL-MCNC: 258 MG/DL
TSH SERPL-ACNC: 1.72 UIU/ML
WBC # FLD AUTO: 3.72 K/UL

## 2022-03-17 ENCOUNTER — APPOINTMENT (OUTPATIENT)
Dept: ENDOCRINOLOGY | Facility: CLINIC | Age: 67
End: 2022-03-17
Payer: COMMERCIAL

## 2022-03-17 VITALS — WEIGHT: 228 LBS | HEIGHT: 72 IN | BODY MASS INDEX: 30.88 KG/M2

## 2022-03-17 VITALS
SYSTOLIC BLOOD PRESSURE: 128 MMHG | DIASTOLIC BLOOD PRESSURE: 80 MMHG | TEMPERATURE: 97.5 F | OXYGEN SATURATION: 98 % | HEART RATE: 72 BPM

## 2022-03-17 PROCEDURE — 99214 OFFICE O/P EST MOD 30 MIN: CPT

## 2022-03-17 RX ORDER — ASPIRIN 81 MG
81 TABLET, DELAYED RELEASE (ENTERIC COATED) ORAL DAILY
Refills: 0 | Status: ACTIVE | COMMUNITY

## 2022-03-17 RX ORDER — SEMAGLUTIDE 1.34 MG/ML
2 INJECTION, SOLUTION SUBCUTANEOUS
Qty: 1 | Refills: 2 | Status: COMPLETED | COMMUNITY
Start: 2022-03-17 | End: 2022-03-17

## 2022-04-05 ENCOUNTER — RX RENEWAL (OUTPATIENT)
Age: 67
End: 2022-04-05

## 2022-05-17 ENCOUNTER — RX RENEWAL (OUTPATIENT)
Age: 67
End: 2022-05-17

## 2022-05-26 ENCOUNTER — RX RENEWAL (OUTPATIENT)
Age: 67
End: 2022-05-26

## 2022-06-05 ENCOUNTER — NON-APPOINTMENT (OUTPATIENT)
Age: 67
End: 2022-06-05

## 2022-06-06 ENCOUNTER — INPATIENT (INPATIENT)
Facility: HOSPITAL | Age: 67
LOS: 3 days | Discharge: HOME | DRG: 872 | End: 2022-06-10
Attending: HOSPITALIST | Admitting: HOSPITALIST
Payer: COMMERCIAL

## 2022-06-06 VITALS
SYSTOLIC BLOOD PRESSURE: 142 MMHG | OXYGEN SATURATION: 97 % | RESPIRATION RATE: 18 BRPM | HEART RATE: 96 BPM | TEMPERATURE: 98 F | WEIGHT: 220.02 LBS | DIASTOLIC BLOOD PRESSURE: 77 MMHG

## 2022-06-06 LAB
ALBUMIN SERPL ELPH-MCNC: 4.4 G/DL — SIGNIFICANT CHANGE UP (ref 3.3–5)
ALP SERPL-CCNC: 43 U/L — SIGNIFICANT CHANGE UP (ref 40–120)
ALT FLD-CCNC: 28 U/L — SIGNIFICANT CHANGE UP (ref 12–78)
ANION GAP SERPL CALC-SCNC: 8 MMOL/L — SIGNIFICANT CHANGE UP (ref 5–17)
APPEARANCE UR: CLEAR — SIGNIFICANT CHANGE UP
AST SERPL-CCNC: 22 U/L — SIGNIFICANT CHANGE UP (ref 15–37)
BASOPHILS # BLD AUTO: 0 K/UL — SIGNIFICANT CHANGE UP (ref 0–0.2)
BASOPHILS NFR BLD AUTO: 0 % — SIGNIFICANT CHANGE UP (ref 0–2)
BILIRUB SERPL-MCNC: 1.1 MG/DL — SIGNIFICANT CHANGE UP (ref 0.2–1.2)
BILIRUB UR-MCNC: NEGATIVE — SIGNIFICANT CHANGE UP
BUN SERPL-MCNC: 21 MG/DL — SIGNIFICANT CHANGE UP (ref 7–23)
CALCIUM SERPL-MCNC: 10.8 MG/DL — HIGH (ref 8.5–10.1)
CHLORIDE SERPL-SCNC: 103 MMOL/L — SIGNIFICANT CHANGE UP (ref 96–108)
CO2 SERPL-SCNC: 24 MMOL/L — SIGNIFICANT CHANGE UP (ref 22–31)
COLOR SPEC: YELLOW — SIGNIFICANT CHANGE UP
CREAT SERPL-MCNC: 1.49 MG/DL — HIGH (ref 0.5–1.3)
DIFF PNL FLD: ABNORMAL
EGFR: 51 ML/MIN/1.73M2 — LOW
EOSINOPHIL # BLD AUTO: 0 K/UL — SIGNIFICANT CHANGE UP (ref 0–0.5)
EOSINOPHIL NFR BLD AUTO: 0 % — SIGNIFICANT CHANGE UP (ref 0–6)
GLUCOSE SERPL-MCNC: 124 MG/DL — HIGH (ref 70–99)
GLUCOSE UR QL: NEGATIVE — SIGNIFICANT CHANGE UP
HCT VFR BLD CALC: 36.9 % — LOW (ref 39–50)
HGB BLD-MCNC: 12.7 G/DL — LOW (ref 13–17)
KETONES UR-MCNC: ABNORMAL
LEUKOCYTE ESTERASE UR-ACNC: ABNORMAL
LIDOCAIN IGE QN: 226 U/L — SIGNIFICANT CHANGE UP (ref 73–393)
LYMPHOCYTES # BLD AUTO: 1.61 K/UL — SIGNIFICANT CHANGE UP (ref 1–3.3)
LYMPHOCYTES # BLD AUTO: 12 % — LOW (ref 13–44)
MCHC RBC-ENTMCNC: 31.9 PG — SIGNIFICANT CHANGE UP (ref 27–34)
MCHC RBC-ENTMCNC: 34.4 GM/DL — SIGNIFICANT CHANGE UP (ref 32–36)
MCV RBC AUTO: 92.7 FL — SIGNIFICANT CHANGE UP (ref 80–100)
MONOCYTES # BLD AUTO: 2.95 K/UL — HIGH (ref 0–0.9)
MONOCYTES NFR BLD AUTO: 22 % — HIGH (ref 2–14)
NEUTROPHILS # BLD AUTO: 8.86 K/UL — HIGH (ref 1.8–7.4)
NEUTROPHILS NFR BLD AUTO: 66 % — SIGNIFICANT CHANGE UP (ref 43–77)
NITRITE UR-MCNC: POSITIVE
NRBC # BLD: SIGNIFICANT CHANGE UP /100 WBCS (ref 0–0)
PH UR: 5 — SIGNIFICANT CHANGE UP (ref 5–8)
PLATELET # BLD AUTO: 145 K/UL — LOW (ref 150–400)
POTASSIUM SERPL-MCNC: 3.8 MMOL/L — SIGNIFICANT CHANGE UP (ref 3.5–5.3)
POTASSIUM SERPL-SCNC: 3.8 MMOL/L — SIGNIFICANT CHANGE UP (ref 3.5–5.3)
PROT SERPL-MCNC: 8.3 GM/DL — SIGNIFICANT CHANGE UP (ref 6–8.3)
PROT UR-MCNC: 30 MG/DL
RBC # BLD: 3.98 M/UL — LOW (ref 4.2–5.8)
RBC # FLD: 13.3 % — SIGNIFICANT CHANGE UP (ref 10.3–14.5)
SODIUM SERPL-SCNC: 135 MMOL/L — SIGNIFICANT CHANGE UP (ref 135–145)
SP GR SPEC: 1.02 — SIGNIFICANT CHANGE UP (ref 1.01–1.02)
UROBILINOGEN FLD QL: NEGATIVE — SIGNIFICANT CHANGE UP
WBC # BLD: 13.42 K/UL — HIGH (ref 3.8–10.5)
WBC # FLD AUTO: 13.42 K/UL — HIGH (ref 3.8–10.5)

## 2022-06-06 PROCEDURE — 74176 CT ABD & PELVIS W/O CONTRAST: CPT | Mod: 26,ME

## 2022-06-06 PROCEDURE — G1004: CPT

## 2022-06-06 PROCEDURE — 99285 EMERGENCY DEPT VISIT HI MDM: CPT

## 2022-06-06 RX ORDER — ONDANSETRON 8 MG/1
4 TABLET, FILM COATED ORAL ONCE
Refills: 0 | Status: COMPLETED | OUTPATIENT
Start: 2022-06-06 | End: 2022-06-06

## 2022-06-06 RX ORDER — ACETAMINOPHEN 500 MG
1000 TABLET ORAL ONCE
Refills: 0 | Status: COMPLETED | OUTPATIENT
Start: 2022-06-06 | End: 2022-06-06

## 2022-06-06 RX ORDER — SODIUM CHLORIDE 9 MG/ML
2400 INJECTION INTRAMUSCULAR; INTRAVENOUS; SUBCUTANEOUS ONCE
Refills: 0 | Status: DISCONTINUED | OUTPATIENT
Start: 2022-06-06 | End: 2022-06-06

## 2022-06-06 RX ORDER — SODIUM CHLORIDE 9 MG/ML
1000 INJECTION INTRAMUSCULAR; INTRAVENOUS; SUBCUTANEOUS ONCE
Refills: 0 | Status: COMPLETED | OUTPATIENT
Start: 2022-06-06 | End: 2022-06-06

## 2022-06-06 RX ORDER — SODIUM CHLORIDE 9 MG/ML
1500 INJECTION INTRAMUSCULAR; INTRAVENOUS; SUBCUTANEOUS ONCE
Refills: 0 | Status: COMPLETED | OUTPATIENT
Start: 2022-06-06 | End: 2022-06-06

## 2022-06-06 RX ORDER — MORPHINE SULFATE 50 MG/1
4 CAPSULE, EXTENDED RELEASE ORAL ONCE
Refills: 0 | Status: DISCONTINUED | OUTPATIENT
Start: 2022-06-06 | End: 2022-06-06

## 2022-06-06 RX ORDER — CEFTRIAXONE 500 MG/1
1000 INJECTION, POWDER, FOR SOLUTION INTRAMUSCULAR; INTRAVENOUS ONCE
Refills: 0 | Status: COMPLETED | OUTPATIENT
Start: 2022-06-06 | End: 2022-06-06

## 2022-06-06 RX ADMIN — ONDANSETRON 4 MILLIGRAM(S): 8 TABLET, FILM COATED ORAL at 22:58

## 2022-06-06 RX ADMIN — CEFTRIAXONE 100 MILLIGRAM(S): 500 INJECTION, POWDER, FOR SOLUTION INTRAMUSCULAR; INTRAVENOUS at 23:53

## 2022-06-06 RX ADMIN — Medication 1000 MILLIGRAM(S): at 23:30

## 2022-06-06 RX ADMIN — SODIUM CHLORIDE 1500 MILLILITER(S): 9 INJECTION INTRAMUSCULAR; INTRAVENOUS; SUBCUTANEOUS at 23:53

## 2022-06-06 RX ADMIN — Medication 400 MILLIGRAM(S): at 22:58

## 2022-06-06 RX ADMIN — MORPHINE SULFATE 4 MILLIGRAM(S): 50 CAPSULE, EXTENDED RELEASE ORAL at 23:15

## 2022-06-06 RX ADMIN — SODIUM CHLORIDE 1000 MILLILITER(S): 9 INJECTION INTRAMUSCULAR; INTRAVENOUS; SUBCUTANEOUS at 23:58

## 2022-06-06 RX ADMIN — MORPHINE SULFATE 4 MILLIGRAM(S): 50 CAPSULE, EXTENDED RELEASE ORAL at 22:58

## 2022-06-06 RX ADMIN — SODIUM CHLORIDE 1000 MILLILITER(S): 9 INJECTION INTRAMUSCULAR; INTRAVENOUS; SUBCUTANEOUS at 22:57

## 2022-06-06 RX ADMIN — Medication 1000 MILLIGRAM(S): at 23:15

## 2022-06-06 NOTE — ED PROVIDER NOTE - GASTROINTESTINAL, MLM
No CVA tenderness or abdominal tenderness. Abdomen soft, non-tender, no guarding. Umbilical hernia that's retractable.

## 2022-06-06 NOTE — ED PROVIDER NOTE - OBJECTIVE STATEMENT
65 y/o male with a PMHx of HLD, DM type II on metformin, kidney stones presents to the ED c/o right sided flank pain starting Saturday. Pt states that he went to  today due to flank pain. At , found blood in urine and infection so pt was sent here for eval. Pt took Motrin for fever earlier today.   Dr. Ochoa- urologist

## 2022-06-06 NOTE — ED ADULT NURSE NOTE - OBJECTIVE STATEMENT
Pt c/o R sided flank pain starting Saturday. HX of kidney stones. Sent in from urgent care for suspected kidney stones and hematuria and dysuria. Pt denies any chest pain, or sob. Pt hx of dm. No s/s of distress.

## 2022-06-06 NOTE — ED ADULT TRIAGE NOTE - CHIEF COMPLAINT QUOTE
Pt c/o R sided flank pain starting Saturday. HX of kidney stones. Sent in from urgent care for suspected kidney stones and hematuria and dysuria. Took aspirin and ibuprofen 3 hours ago with relief. Denies any other complaints.

## 2022-06-06 NOTE — ED PROVIDER NOTE - PROGRESS NOTE DETAILS
pt here with flank pain and sent from  for infected urine with blood r/o kidney stones. pt states his pain feels like his pain in the past. pt aware of labs and imaging results and agrees to stay for IV antibiotics. paged Dr. Ochoa awaiting call back for consult.  will admit for further management. -Lashaun Dahl PA-C Discussed with Dr. Doran who's on call for Dr. Ochoa.  Will have Dr. Ochoa see in AM.  Will keep NPO tonight for possible stent if doesn't pass stone. David Sewell D.O.

## 2022-06-06 NOTE — ED PROVIDER NOTE - NSICDXPASTMEDICALHX_GEN_ALL_CORE_FT
PAST MEDICAL HISTORY:  DM (diabetes mellitus), type 2     HLD (hyperlipidemia)     Kidney stones

## 2022-06-06 NOTE — ED PROVIDER NOTE - NS ED ATTENDING STATEMENT MOD
This was a shared visit with the [facility]. I reviewed and verified the documentation and independently performed the documented:

## 2022-06-07 DIAGNOSIS — N20.1 CALCULUS OF URETER: ICD-10-CM

## 2022-06-07 DIAGNOSIS — Z98.890 OTHER SPECIFIED POSTPROCEDURAL STATES: Chronic | ICD-10-CM

## 2022-06-07 DIAGNOSIS — N20.0 CALCULUS OF KIDNEY: ICD-10-CM

## 2022-06-07 LAB
24R-OH-CALCIDIOL SERPL-MCNC: 22.9 NG/ML — LOW (ref 30–80)
A1C WITH ESTIMATED AVERAGE GLUCOSE RESULT: 6.6 % — HIGH (ref 4–5.6)
ALBUMIN SERPL ELPH-MCNC: 3.5 G/DL — SIGNIFICANT CHANGE UP (ref 3.3–5)
ALP SERPL-CCNC: 35 U/L — LOW (ref 40–120)
ALT FLD-CCNC: 21 U/L — SIGNIFICANT CHANGE UP (ref 12–78)
ANION GAP SERPL CALC-SCNC: 10 MMOL/L — SIGNIFICANT CHANGE UP (ref 5–17)
AST SERPL-CCNC: 19 U/L — SIGNIFICANT CHANGE UP (ref 15–37)
BASOPHILS # BLD AUTO: 0.07 K/UL — SIGNIFICANT CHANGE UP (ref 0–0.2)
BASOPHILS NFR BLD AUTO: 0.9 % — SIGNIFICANT CHANGE UP (ref 0–2)
BILIRUB SERPL-MCNC: 0.8 MG/DL — SIGNIFICANT CHANGE UP (ref 0.2–1.2)
BUN SERPL-MCNC: 15 MG/DL — SIGNIFICANT CHANGE UP (ref 7–23)
CALCIUM SERPL-MCNC: 9.3 MG/DL — SIGNIFICANT CHANGE UP (ref 8.5–10.1)
CALCIUM SERPL-MCNC: 9.5 MG/DL — SIGNIFICANT CHANGE UP (ref 8.4–10.5)
CHLORIDE SERPL-SCNC: 107 MMOL/L — SIGNIFICANT CHANGE UP (ref 96–108)
CO2 SERPL-SCNC: 20 MMOL/L — LOW (ref 22–31)
CREAT SERPL-MCNC: 1.2 MG/DL — SIGNIFICANT CHANGE UP (ref 0.5–1.3)
EGFR: 66 ML/MIN/1.73M2 — SIGNIFICANT CHANGE UP
EOSINOPHIL # BLD AUTO: 0.02 K/UL — SIGNIFICANT CHANGE UP (ref 0–0.5)
EOSINOPHIL NFR BLD AUTO: 0.3 % — SIGNIFICANT CHANGE UP (ref 0–6)
ESTIMATED AVERAGE GLUCOSE: 143 MG/DL — HIGH (ref 68–114)
FLUAV AG NPH QL: SIGNIFICANT CHANGE UP
FLUBV AG NPH QL: SIGNIFICANT CHANGE UP
GLUCOSE SERPL-MCNC: 146 MG/DL — HIGH (ref 70–99)
HCT VFR BLD CALC: 32.6 % — LOW (ref 39–50)
HCV AB S/CO SERPL IA: 0.23 S/CO — SIGNIFICANT CHANGE UP (ref 0–0.99)
HCV AB SERPL-IMP: SIGNIFICANT CHANGE UP
HGB BLD-MCNC: 10.7 G/DL — LOW (ref 13–17)
IMM GRANULOCYTES NFR BLD AUTO: 0.4 % — SIGNIFICANT CHANGE UP (ref 0–1.5)
LACTATE SERPL-SCNC: 1.6 MMOL/L — SIGNIFICANT CHANGE UP (ref 0.7–2)
LACTATE SERPL-SCNC: 2.5 MMOL/L — HIGH (ref 0.7–2)
LYMPHOCYTES # BLD AUTO: 1.55 K/UL — SIGNIFICANT CHANGE UP (ref 1–3.3)
LYMPHOCYTES # BLD AUTO: 19.7 % — SIGNIFICANT CHANGE UP (ref 13–44)
MCHC RBC-ENTMCNC: 30.7 PG — SIGNIFICANT CHANGE UP (ref 27–34)
MCHC RBC-ENTMCNC: 32.8 GM/DL — SIGNIFICANT CHANGE UP (ref 32–36)
MCV RBC AUTO: 93.4 FL — SIGNIFICANT CHANGE UP (ref 80–100)
MONOCYTES # BLD AUTO: 1.92 K/UL — HIGH (ref 0–0.9)
MONOCYTES NFR BLD AUTO: 24.4 % — HIGH (ref 2–14)
NEUTROPHILS # BLD AUTO: 4.29 K/UL — SIGNIFICANT CHANGE UP (ref 1.8–7.4)
NEUTROPHILS NFR BLD AUTO: 54.3 % — SIGNIFICANT CHANGE UP (ref 43–77)
PHOSPHATE SERPL-MCNC: 3.2 MG/DL — SIGNIFICANT CHANGE UP (ref 2.5–4.5)
PLATELET # BLD AUTO: 120 K/UL — LOW (ref 150–400)
POTASSIUM SERPL-MCNC: 3.8 MMOL/L — SIGNIFICANT CHANGE UP (ref 3.5–5.3)
POTASSIUM SERPL-SCNC: 3.8 MMOL/L — SIGNIFICANT CHANGE UP (ref 3.5–5.3)
PROT SERPL-MCNC: 6.7 GM/DL — SIGNIFICANT CHANGE UP (ref 6–8.3)
PTH-INTACT FLD-MCNC: 9 PG/ML — LOW (ref 15–65)
RBC # BLD: 3.49 M/UL — LOW (ref 4.2–5.8)
RBC # FLD: 13.3 % — SIGNIFICANT CHANGE UP (ref 10.3–14.5)
RSV RNA NPH QL NAA+NON-PROBE: SIGNIFICANT CHANGE UP
SARS-COV-2 RNA SPEC QL NAA+PROBE: SIGNIFICANT CHANGE UP
SODIUM SERPL-SCNC: 137 MMOL/L — SIGNIFICANT CHANGE UP (ref 135–145)
WBC # BLD: 7.88 K/UL — SIGNIFICANT CHANGE UP (ref 3.8–10.5)
WBC # FLD AUTO: 7.88 K/UL — SIGNIFICANT CHANGE UP (ref 3.8–10.5)

## 2022-06-07 PROCEDURE — 36415 COLL VENOUS BLD VENIPUNCTURE: CPT

## 2022-06-07 PROCEDURE — 74183 MRI ABD W/O CNTR FLWD CNTR: CPT

## 2022-06-07 PROCEDURE — 99223 1ST HOSP IP/OBS HIGH 75: CPT

## 2022-06-07 PROCEDURE — 83970 ASSAY OF PARATHORMONE: CPT

## 2022-06-07 PROCEDURE — 83605 ASSAY OF LACTIC ACID: CPT

## 2022-06-07 PROCEDURE — 80053 COMPREHEN METABOLIC PANEL: CPT

## 2022-06-07 PROCEDURE — 82306 VITAMIN D 25 HYDROXY: CPT

## 2022-06-07 PROCEDURE — 12345: CPT | Mod: NC

## 2022-06-07 PROCEDURE — 93010 ELECTROCARDIOGRAM REPORT: CPT

## 2022-06-07 PROCEDURE — 85025 COMPLETE CBC W/AUTO DIFF WBC: CPT

## 2022-06-07 PROCEDURE — 85027 COMPLETE CBC AUTOMATED: CPT

## 2022-06-07 PROCEDURE — 82310 ASSAY OF CALCIUM: CPT

## 2022-06-07 PROCEDURE — 80048 BASIC METABOLIC PNL TOTAL CA: CPT

## 2022-06-07 PROCEDURE — A9579: CPT

## 2022-06-07 PROCEDURE — 83036 HEMOGLOBIN GLYCOSYLATED A1C: CPT

## 2022-06-07 PROCEDURE — 82962 GLUCOSE BLOOD TEST: CPT

## 2022-06-07 PROCEDURE — 87040 BLOOD CULTURE FOR BACTERIA: CPT

## 2022-06-07 PROCEDURE — 93005 ELECTROCARDIOGRAM TRACING: CPT

## 2022-06-07 PROCEDURE — 0241U: CPT

## 2022-06-07 PROCEDURE — 86803 HEPATITIS C AB TEST: CPT

## 2022-06-07 PROCEDURE — 84100 ASSAY OF PHOSPHORUS: CPT

## 2022-06-07 RX ORDER — METFORMIN HYDROCHLORIDE 850 MG/1
0 TABLET ORAL
Qty: 0 | Refills: 0 | DISCHARGE

## 2022-06-07 RX ORDER — SODIUM CHLORIDE 9 MG/ML
1000 INJECTION, SOLUTION INTRAVENOUS
Refills: 0 | Status: DISCONTINUED | OUTPATIENT
Start: 2022-06-07 | End: 2022-06-10

## 2022-06-07 RX ORDER — GLUCAGON INJECTION, SOLUTION 0.5 MG/.1ML
1 INJECTION, SOLUTION SUBCUTANEOUS ONCE
Refills: 0 | Status: DISCONTINUED | OUTPATIENT
Start: 2022-06-07 | End: 2022-06-10

## 2022-06-07 RX ORDER — ATORVASTATIN CALCIUM 80 MG/1
0 TABLET, FILM COATED ORAL
Qty: 0 | Refills: 0 | DISCHARGE

## 2022-06-07 RX ORDER — DEXTROSE 50 % IN WATER 50 %
12.5 SYRINGE (ML) INTRAVENOUS ONCE
Refills: 0 | Status: DISCONTINUED | OUTPATIENT
Start: 2022-06-07 | End: 2022-06-10

## 2022-06-07 RX ORDER — ONDANSETRON 8 MG/1
4 TABLET, FILM COATED ORAL EVERY 6 HOURS
Refills: 0 | Status: DISCONTINUED | OUTPATIENT
Start: 2022-06-07 | End: 2022-06-10

## 2022-06-07 RX ORDER — SODIUM CHLORIDE 9 MG/ML
1000 INJECTION INTRAMUSCULAR; INTRAVENOUS; SUBCUTANEOUS
Refills: 0 | Status: DISCONTINUED | OUTPATIENT
Start: 2022-06-07 | End: 2022-06-10

## 2022-06-07 RX ORDER — DEXTROSE 50 % IN WATER 50 %
25 SYRINGE (ML) INTRAVENOUS ONCE
Refills: 0 | Status: DISCONTINUED | OUTPATIENT
Start: 2022-06-07 | End: 2022-06-10

## 2022-06-07 RX ORDER — ASPIRIN/CALCIUM CARB/MAGNESIUM 324 MG
81 TABLET ORAL DAILY
Refills: 0 | Status: DISCONTINUED | OUTPATIENT
Start: 2022-06-07 | End: 2022-06-10

## 2022-06-07 RX ORDER — INSULIN LISPRO 100/ML
VIAL (ML) SUBCUTANEOUS
Refills: 0 | Status: DISCONTINUED | OUTPATIENT
Start: 2022-06-07 | End: 2022-06-10

## 2022-06-07 RX ORDER — INSULIN LISPRO 100/ML
VIAL (ML) SUBCUTANEOUS AT BEDTIME
Refills: 0 | Status: DISCONTINUED | OUTPATIENT
Start: 2022-06-07 | End: 2022-06-07

## 2022-06-07 RX ORDER — ENOXAPARIN SODIUM 100 MG/ML
40 INJECTION SUBCUTANEOUS EVERY 24 HOURS
Refills: 0 | Status: DISCONTINUED | OUTPATIENT
Start: 2022-06-07 | End: 2022-06-10

## 2022-06-07 RX ORDER — INSULIN LISPRO 100/ML
VIAL (ML) SUBCUTANEOUS AT BEDTIME
Refills: 0 | Status: DISCONTINUED | OUTPATIENT
Start: 2022-06-07 | End: 2022-06-10

## 2022-06-07 RX ORDER — ATORVASTATIN CALCIUM 80 MG/1
10 TABLET, FILM COATED ORAL AT BEDTIME
Refills: 0 | Status: DISCONTINUED | OUTPATIENT
Start: 2022-06-07 | End: 2022-06-10

## 2022-06-07 RX ORDER — ACETAMINOPHEN 500 MG
650 TABLET ORAL EVERY 6 HOURS
Refills: 0 | Status: DISCONTINUED | OUTPATIENT
Start: 2022-06-07 | End: 2022-06-10

## 2022-06-07 RX ORDER — LEVOTHYROXINE SODIUM 125 MCG
125 TABLET ORAL DAILY
Refills: 0 | Status: DISCONTINUED | OUTPATIENT
Start: 2022-06-07 | End: 2022-06-10

## 2022-06-07 RX ORDER — SEMAGLUTIDE 0.68 MG/ML
0 INJECTION, SOLUTION SUBCUTANEOUS
Qty: 0 | Refills: 0 | DISCHARGE

## 2022-06-07 RX ORDER — TAMSULOSIN HYDROCHLORIDE 0.4 MG/1
0.4 CAPSULE ORAL AT BEDTIME
Refills: 0 | Status: DISCONTINUED | OUTPATIENT
Start: 2022-06-07 | End: 2022-06-07

## 2022-06-07 RX ORDER — DEXTROSE 50 % IN WATER 50 %
15 SYRINGE (ML) INTRAVENOUS ONCE
Refills: 0 | Status: DISCONTINUED | OUTPATIENT
Start: 2022-06-07 | End: 2022-06-10

## 2022-06-07 RX ORDER — MORPHINE SULFATE 50 MG/1
2 CAPSULE, EXTENDED RELEASE ORAL EVERY 4 HOURS
Refills: 0 | Status: DISCONTINUED | OUTPATIENT
Start: 2022-06-07 | End: 2022-06-10

## 2022-06-07 RX ORDER — SODIUM CHLORIDE 9 MG/ML
1000 INJECTION INTRAMUSCULAR; INTRAVENOUS; SUBCUTANEOUS
Refills: 0 | Status: DISCONTINUED | OUTPATIENT
Start: 2022-06-07 | End: 2022-06-07

## 2022-06-07 RX ORDER — CHOLECALCIFEROL (VITAMIN D3) 125 MCG
2000 CAPSULE ORAL DAILY
Refills: 0 | Status: DISCONTINUED | OUTPATIENT
Start: 2022-06-07 | End: 2022-06-10

## 2022-06-07 RX ORDER — LANOLIN ALCOHOL/MO/W.PET/CERES
3 CREAM (GRAM) TOPICAL AT BEDTIME
Refills: 0 | Status: DISCONTINUED | OUTPATIENT
Start: 2022-06-07 | End: 2022-06-10

## 2022-06-07 RX ORDER — TAMSULOSIN HYDROCHLORIDE 0.4 MG/1
0.4 CAPSULE ORAL AT BEDTIME
Refills: 0 | Status: DISCONTINUED | OUTPATIENT
Start: 2022-06-07 | End: 2022-06-10

## 2022-06-07 RX ORDER — FENOFIBRATE,MICRONIZED 130 MG
145 CAPSULE ORAL DAILY
Refills: 0 | Status: DISCONTINUED | OUTPATIENT
Start: 2022-06-07 | End: 2022-06-10

## 2022-06-07 RX ORDER — LEVOTHYROXINE SODIUM 125 MCG
0 TABLET ORAL
Qty: 0 | Refills: 0 | DISCHARGE

## 2022-06-07 RX ORDER — CEFTRIAXONE 500 MG/1
1000 INJECTION, POWDER, FOR SOLUTION INTRAMUSCULAR; INTRAVENOUS EVERY 24 HOURS
Refills: 0 | Status: DISCONTINUED | OUTPATIENT
Start: 2022-06-07 | End: 2022-06-09

## 2022-06-07 RX ADMIN — ENOXAPARIN SODIUM 40 MILLIGRAM(S): 100 INJECTION SUBCUTANEOUS at 12:26

## 2022-06-07 RX ADMIN — SODIUM CHLORIDE 100 MILLILITER(S): 9 INJECTION INTRAMUSCULAR; INTRAVENOUS; SUBCUTANEOUS at 04:15

## 2022-06-07 RX ADMIN — ATORVASTATIN CALCIUM 10 MILLIGRAM(S): 80 TABLET, FILM COATED ORAL at 21:32

## 2022-06-07 RX ADMIN — Medication 2000 UNIT(S): at 12:24

## 2022-06-07 RX ADMIN — TAMSULOSIN HYDROCHLORIDE 0.4 MILLIGRAM(S): 0.4 CAPSULE ORAL at 21:32

## 2022-06-07 RX ADMIN — Medication 0: at 17:12

## 2022-06-07 RX ADMIN — SODIUM CHLORIDE 1500 MILLILITER(S): 9 INJECTION INTRAMUSCULAR; INTRAVENOUS; SUBCUTANEOUS at 01:00

## 2022-06-07 RX ADMIN — TAMSULOSIN HYDROCHLORIDE 0.4 MILLIGRAM(S): 0.4 CAPSULE ORAL at 00:27

## 2022-06-07 RX ADMIN — Medication 650 MILLIGRAM(S): at 17:25

## 2022-06-07 RX ADMIN — CEFTRIAXONE 100 MILLIGRAM(S): 500 INJECTION, POWDER, FOR SOLUTION INTRAMUSCULAR; INTRAVENOUS at 21:31

## 2022-06-07 RX ADMIN — SODIUM CHLORIDE 125 MILLILITER(S): 9 INJECTION INTRAMUSCULAR; INTRAVENOUS; SUBCUTANEOUS at 03:59

## 2022-06-07 RX ADMIN — CEFTRIAXONE 1000 MILLIGRAM(S): 500 INJECTION, POWDER, FOR SOLUTION INTRAMUSCULAR; INTRAVENOUS at 00:23

## 2022-06-07 RX ADMIN — Medication 81 MILLIGRAM(S): at 12:23

## 2022-06-07 RX ADMIN — Medication 125 MICROGRAM(S): at 12:23

## 2022-06-07 RX ADMIN — Medication 145 MILLIGRAM(S): at 17:26

## 2022-06-07 NOTE — H&P ADULT - NSHPPHYSICALEXAM_GEN_ALL_CORE
ICU Vital Signs Last 24 Hrs  T(C): 36.6 (07 Jun 2022 02:22), Max: 36.9 (07 Jun 2022 01:45)  T(F): 97.9 (07 Jun 2022 02:22), Max: 98.5 (07 Jun 2022 01:45)  HR: 72 (07 Jun 2022 02:22) (70 - 96)  BP: 134/81 (07 Jun 2022 02:22) (129/77 - 147/78)  BP(mean): 96 (07 Jun 2022 02:22) (92 - 96)  RR: 18 (07 Jun 2022 02:22) (17 - 18)  SpO2: 98% (07 Jun 2022 02:22) (96% - 98%)    General: Awake and alert, cooperative with exam. No acute distress.   Skin: Warm, dry, and pink.   Eyes: Pupils equal and reactive to light. Extraocular eye movements intact. No conjunctival injection, discharge, or scleral icterus.   HEENT: Atraumatic, normocephalic. Moist mucus membranes.   Cardiology: Normal S1, S2. No murmurs, rubs, or gallops. Regular rate and rhythm.   Respiratory: Lungs clear to ascultation bilaterally. Good air exchange. No wheezes, rales, or rhonchi. Normal chest expansion.   Gastrointestinal: Positive bowel sounds. Soft, non-tender, non-distended. No guarding, rigidity, or rebound tenderness. No hepatosplenomegaly. Right CVA tenderness.   Musculoskeletal: 5/5 motor strength in all extremities. Normal range of motion.   Extremities: No peripheral edema bilaterally. Dorsalis pedis pulses 2+ bilaterally.   Neurological: A+Ox3 (person, place, and time). Cranial nerves 2-12 intact. Normal speech. No facial droop. No focal neurological deficits.   Psychiatric: Normal affect. Normal mood.

## 2022-06-07 NOTE — H&P ADULT - NSHPREVIEWOFSYSTEMS_GEN_ALL_CORE
Constitutional: negative for fatigue, negative for fever, negative for chills, negative for decreased appetite.  Skin: negative for rashes, negative for open wounds, negative for jaundice.   Eyes: negative for blurry vision, negative for double vision.   Ears, nose, throat: negative for ear pain, negative for nasal congestion, negative for sore throat, negative for lymph node swelling.   Cardiovascular: negative for chest pain, negative for palpitations, negative for lower extremity swelling.   Respiratory: negative for shortness of breath, negative for wheezing, negative for cough.   Gastrointestinal: negative for abdominal pain, negative for nausea, negative for vomiting, negative for diarrhea, negative for constipation, negative for blood in the stool, negative for black tarry stools.   Genitourinary: positive for right flank pain, positive for burning on urination, negative for urinary urgency or frequency, negative for blood in the urine.   Endocrine: negative for cold intolerance, negative for heat intolerance, negative for increased thirst.   Hematologic: negative for easy bruising or bleeding.   Musculoskeletal: negative for muscle/joint pain, negative for decreased range of motion.   Neurological: negative for dizziness, negative for headaches, negative for loss of consciousness, negative for motor weakness, negative for sensory deficits.   Psychiatric: negative for depression, negative for anxiety.

## 2022-06-07 NOTE — H&P ADULT - HISTORY OF PRESENT ILLNESS
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 Pt reports nausea, temperature of 100 F at home, burning on urination. He went to urgent care and UA showed significant blood and UTI. He was sent to the hospital for evaluation. No other complaints. Denies fevers, chills, chest pain, SOB, abdominal pain, N/V, diarrhea/constipation.     ER course: VSS. Labs: WBC 13.42, Hb 12.7, , Lactate 2.5, Cr 1.49 (baseline ~1.1), Glucose 124, Calcium 10.8. UA: trace ketones, positive nitrite, small leukocyte esterase, large blood, WBC 11-25, RBC 6-10, occasional bacteria, few crystals. COVID, influenza A/B, RVP negative. EKG: NSR with HR 73 bpm, normal intervals, no ST segment changes, no T wave inversions (personally reviewed).     Imaging:   - CT abd/pelvis: 4 mm calculus in the right mid ureter with mild right hydronephrosis. Additional right intrarenal calculi measuring up to 7 mm. 8 mm nonobstructive left renal calculus. No left-sided hydroureteronephrosis. 1.1 cm indeterminate right upper pole hyperdensity, may represent a complex cyst versus mass. Further evaluation with nonemergent contrast-enhanced MRI is recommended.    Pt was given Ceftriaxone, 1500 ml of NS, morphine, zofran, tylenol. He is being admitted to med/surg for further management.

## 2022-06-07 NOTE — PATIENT PROFILE ADULT - NSPROEXTENSIONSOFSELF_GEN_A_NUR
PATIENT HAS BEEN CALLED, SHE STATED THAT SHE HAS MOVED [place].  SHE SEES DR ARCADIO ROBIN AT .    SHE STATED THAT SHE TOLD [facility] THAT SHE DID NOT NEED THIS MEDICATION.    eyeglasses

## 2022-06-07 NOTE — H&P ADULT - ASSESSMENT
67 y/o M presents with right flank pain     1. 4 mm calculus in the right mid ureter with mild right hydronephrosis  - Admit to med/surg   - CT scan: 4 mm calculus in the right mid ureter with mild right hydronephrosis. Additional right intrarenal calculi measuring up to 7 mm. 8 mm nonobstructive left renal calculus. No left-sided hydroureteronephrosis.  - Start Flomax   - c/w Ceftriaxone   - Strain urine   - Pain control: Tylenol, Morphine   - Zofran for nausea   - s/p 1L of NS in the ER, c/w 100 ml/hr   - NPO, advance diet as tolerated   - Urology consult - Dr. Ochoa    2. Urinary tract infection   - UA: positive nitrite, small leukocyte esterase, WBC 11-25, RBC 6-10, occasional bacteria  - c/w Ceftriaxone   - f/u UCx, BCx x 2     3. Leuckoytosis   - WBC 13.42, trend   - Tylenol for fevers PRN     4. Normocytic anemia  - Hb 12.7, monitor closely     5. Thrombocytopenia   - , trend     6. Elevated lactic acid   - Lactate 2.5, s/p 1500 ml of NS and continued on maintenance IVF   - Will reflex lactic acid   - c/w IVF     7. Elevated Cr   - Cr 1.49 (basline ~1.1), monitor closely   - Gentle hydration with IVF at 100 ml/hr (monitor for fluid overload)   - Avoid nephrotoxic medications   - Strict I+Os, daily weights     8. Hyperglycemia in the setting of Type 2 DM   - Glucose 124, monitor closely   - Ordered HbA1c   - NPO -> advance to Diabetic diet   - Moderate dose ISS, add on lantus and ademelog once tolerating diet     9. Hypercalcemia   - Calcium 10.8, f/u AM level  - May improve with IVF   - Ordered PTH, phosphorous, Vitamin D level     10. 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    65 y/o M presents with right flank pain     1. 4 mm calculus in the right mid ureter with mild right hydronephrosis  - Admit to med/surg   - CT scan: 4 mm calculus in the right mid ureter with mild right hydronephrosis. Additional right intrarenal calculi measuring up to 7 mm. 8 mm nonobstructive left renal calculus. No left-sided hydroureteronephrosis.  - Start Flomax   - c/w Ceftriaxone   - Strain urine   - Pain control: Tylenol, Morphine   - Zofran for nausea   - s/p 1L of NS in the ER, c/w 100 ml/hr   - NPO, advance diet as tolerated   - Urology consult - Dr. Ochoa    2. Urinary tract infection   - UA: positive nitrite, small leukocyte esterase, WBC 11-25, RBC 6-10, occasional bacteria  - c/w Ceftriaxone   - f/u UCx, BCx x 2     3. Leuckoytosis   - WBC 13.42, trend   - Tylenol for fevers PRN     4. Normocytic anemia  - Hb 12.7, monitor closely     5. Thrombocytopenia   - , trend     6. Elevated lactic acid   - Lactate 2.5, s/p 1500 ml of NS and continued on maintenance IVF   - Will reflex lactic acid   - c/w IVF     7. Elevated Cr   - Cr 1.49 (basline ~1.1), monitor closely   - Gentle hydration with IVF at 100 ml/hr (monitor for fluid overload)   - Avoid nephrotoxic medications   - Strict I+Os, daily weights     8. Hyperglycemia in the setting of Type 2 DM   - Glucose 124, monitor closely   - Ordered HbA1c   - NPO -> advance to Diabetic diet   - Moderate dose ISS, add on lantus and ademelog once tolerating diet     9. Hypercalcemia   - Calcium 10.8, f/u AM level  - May improve with IVF   - Ordered PTH, phosphorous, Vitamin D level     10. 1.1 cm indeterminate right upper pole hyperdensity, may represent a complex cyst versus mass   - Ordered MRI abd/pelvis contrast for evaluation     11. 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

## 2022-06-07 NOTE — H&P ADULT - NSICDXPASTMEDICALHX_GEN_ALL_CORE_FT
PAST MEDICAL HISTORY:  DM (diabetes mellitus), type 2     History of staph infection lumbar spine    HLD (hyperlipidemia)     Hypothyroidism     Kidney stones

## 2022-06-07 NOTE — PHARMACOTHERAPY INTERVENTION NOTE - COMMENTS
Patient interviewed at bedside and is able to provide medlist. Patient fills via [facility] but will send any new RX to [facility] upon discharge. Medrec complete. -HJ

## 2022-06-07 NOTE — CONSULT NOTE ADULT - PROBLEM SELECTOR RECOMMENDATION 9
Pt with 4 mm right mid ureteral stone and uti. Pt not febrile. Urine culture sensitivities pending. wbc 7.88, cr 1.2, lactate 1.6, ct 4 mm right mid ureteral stone and left intrarenal 8 mm stone. Currently comfortable without cvat.  Pt for mri incidental lesion found right kidney on ct. If pt remains comfortable may be discharged home with flomax, po abx, strain urine, and can follow up as  outpatient. Thank you

## 2022-06-07 NOTE — PROVIDER CONTACT NOTE (OTHER) - SITUATION
Dr. Sewell spoke to Dr. Doran (covering for Dr. Ochoa' group, answering service #[phone])    answering service connected directly to

## 2022-06-07 NOTE — PATIENT PROFILE ADULT - FALL HARM RISK - UNIVERSAL INTERVENTIONS
Bed in lowest position, wheels locked, appropriate side rails in place/Call bell, personal items and telephone in reach/Instruct patient to call for assistance before getting out of bed or chair/Non-slip footwear when patient is out of bed/[place] to call system/Physically safe environment - no spills, clutter or unnecessary equipment/Purposeful Proactive Rounding/Room/bathroom lighting operational, light cord in reach

## 2022-06-07 NOTE — H&P ADULT - NSICDXFAMILYHX_GEN_ALL_CORE_FT
FAMILY HISTORY:  Father  Still living? Unknown  FH: CAD (coronary artery disease), Age at diagnosis: Age Unknown    Mother  Still living? Unknown  FH: Alzheimers disease, Age at diagnosis: Age Unknown

## 2022-06-08 LAB
-  STAPHYLOCOCCUS EPIDERMIDIS: SIGNIFICANT CHANGE UP
ANION GAP SERPL CALC-SCNC: 6 MMOL/L — SIGNIFICANT CHANGE UP (ref 5–17)
BUN SERPL-MCNC: 9 MG/DL — SIGNIFICANT CHANGE UP (ref 7–23)
CALCIUM SERPL-MCNC: 9.3 MG/DL — SIGNIFICANT CHANGE UP (ref 8.5–10.1)
CHLORIDE SERPL-SCNC: 106 MMOL/L — SIGNIFICANT CHANGE UP (ref 96–108)
CO2 SERPL-SCNC: 24 MMOL/L — SIGNIFICANT CHANGE UP (ref 22–31)
CREAT SERPL-MCNC: 1.29 MG/DL — SIGNIFICANT CHANGE UP (ref 0.5–1.3)
CULTURE RESULTS: SIGNIFICANT CHANGE UP
EGFR: 60 ML/MIN/1.73M2 — SIGNIFICANT CHANGE UP
GLUCOSE SERPL-MCNC: 142 MG/DL — HIGH (ref 70–99)
GRAM STN FLD: SIGNIFICANT CHANGE UP
HCT VFR BLD CALC: 33.2 % — LOW (ref 39–50)
HGB BLD-MCNC: 11.1 G/DL — LOW (ref 13–17)
MCHC RBC-ENTMCNC: 31.8 PG — SIGNIFICANT CHANGE UP (ref 27–34)
MCHC RBC-ENTMCNC: 33.4 GM/DL — SIGNIFICANT CHANGE UP (ref 32–36)
MCV RBC AUTO: 95.1 FL — SIGNIFICANT CHANGE UP (ref 80–100)
METHOD TYPE: SIGNIFICANT CHANGE UP
PLATELET # BLD AUTO: 127 K/UL — LOW (ref 150–400)
POTASSIUM SERPL-MCNC: 4 MMOL/L — SIGNIFICANT CHANGE UP (ref 3.5–5.3)
POTASSIUM SERPL-SCNC: 4 MMOL/L — SIGNIFICANT CHANGE UP (ref 3.5–5.3)
RBC # BLD: 3.49 M/UL — LOW (ref 4.2–5.8)
RBC # FLD: 13.3 % — SIGNIFICANT CHANGE UP (ref 10.3–14.5)
SODIUM SERPL-SCNC: 136 MMOL/L — SIGNIFICANT CHANGE UP (ref 135–145)
SPECIMEN SOURCE: SIGNIFICANT CHANGE UP
SPECIMEN SOURCE: SIGNIFICANT CHANGE UP
WBC # BLD: 4.99 K/UL — SIGNIFICANT CHANGE UP (ref 3.8–10.5)
WBC # FLD AUTO: 4.99 K/UL — SIGNIFICANT CHANGE UP (ref 3.8–10.5)

## 2022-06-08 PROCEDURE — 74183 MRI ABD W/O CNTR FLWD CNTR: CPT | Mod: 26

## 2022-06-08 PROCEDURE — 99232 SBSQ HOSP IP/OBS MODERATE 35: CPT

## 2022-06-08 RX ADMIN — Medication 81 MILLIGRAM(S): at 10:33

## 2022-06-08 RX ADMIN — Medication 650 MILLIGRAM(S): at 18:56

## 2022-06-08 RX ADMIN — Medication 2000 UNIT(S): at 10:33

## 2022-06-08 RX ADMIN — Medication 2: at 16:30

## 2022-06-08 RX ADMIN — Medication 145 MILLIGRAM(S): at 10:33

## 2022-06-08 RX ADMIN — TAMSULOSIN HYDROCHLORIDE 0.4 MILLIGRAM(S): 0.4 CAPSULE ORAL at 21:49

## 2022-06-08 RX ADMIN — Medication 125 MICROGRAM(S): at 05:39

## 2022-06-08 RX ADMIN — CEFTRIAXONE 100 MILLIGRAM(S): 500 INJECTION, POWDER, FOR SOLUTION INTRAMUSCULAR; INTRAVENOUS at 20:47

## 2022-06-08 RX ADMIN — Medication 650 MILLIGRAM(S): at 19:26

## 2022-06-08 RX ADMIN — ENOXAPARIN SODIUM 40 MILLIGRAM(S): 100 INJECTION SUBCUTANEOUS at 12:10

## 2022-06-08 RX ADMIN — ATORVASTATIN CALCIUM 10 MILLIGRAM(S): 80 TABLET, FILM COATED ORAL at 21:49

## 2022-06-08 NOTE — CONSULT NOTE ADULT - ASSESSMENT
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 Pt reports nausea, temperature of 100 F at home, burning on urination. He went to urgent care and UA showed significant blood and UTI. He was sent to the hospital for evaluation. Denies fever, chills at home. In ER he received ceftriaxone.     1. Pyuria. Likely UTI. Bacteremia with CNST. Kidney stones. CRF stage 3.   -urine culture and BC (one bottle only) growing CNST ?contaminant vs true pathogen  -agree with ceftriaxone 1 gm IV qd   -reason for abx use and side effects reviewed with patient; monitor BMP   -repeat BC x 2 in AM  -urology evaluation appreciated  -old chart reviewed to assess prior cultures  -monitor temps  -f/u CBC  -supportive care  2. 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 Pt reports nausea, temperature of 100 F at home, burning on urination. He went to urgent care and UA showed significant blood and UTI. He was sent to the hospital for evaluation. Denies fever, chills at home. In ER he received ceftriaxone.     1. Pyuria. Likely UTI. Bacteremia with CNST. Kidney stones. CRF stage 3.   -urine culture and BC (one bottle only) growing CNST ?contaminant vs true pathogen  -agree with ceftriaxone 1 gm IV qd   -reason for abx use and side effects reviewed with patient; monitor BMP   -repeat BC x 2   -urology evaluation appreciated  -old chart reviewed to assess prior cultures  -f/u cultures  -monitor temps  -f/u CBC  -supportive care  2. Other issues:   -care per medicine

## 2022-06-08 NOTE — CHART NOTE - NSCHARTNOTEFT_GEN_A_CORE
66 year old man admitted for UTI workup showed 4mm calculus un the right midureter with mild right hydronephrosis.    Vital Signs Last 24 Hrs  T(C): 36.6 (07 Jun 2022 08:01), Max: 36.9 (07 Jun 2022 01:45)  T(F): 97.8 (07 Jun 2022 08:01), Max: 98.5 (07 Jun 2022 01:45)  HR: 64 (07 Jun 2022 08:01) (64 - 96)  BP: 127/84 (07 Jun 2022 08:01) (127/84 - 147/78)  BP(mean): 96 (07 Jun 2022 02:22) (92 - 96)  RR: 18 (07 Jun 2022 08:01) (17 - 18)  SpO2: 98% (07 Jun 2022 08:01) (96% - 98%)    ROS:   All 10 systems reviewed and found to be negative with the exception of what has been described above.     PE:  Constitutional: NAD, laying in bed  HEENT: NC/AT  Back: no tenderness  Respiratory: respirations even and non labored, LCTA  Cardiovascular: S1S2 regular, no murmurs  Abdomen: soft, not tender, not distended, positive BS- Right CVA tenderness on palpation   Genitourinary: voiding  Musculoskeletal: no muscle tenderness, no joint swelling or tenderness  Extremities: no pedal edema   Neurological: no focal deficits      PLAN   1. 4 mm calculus in the right mid ureter with mild right hydronephrosis  - CT scan: 4 mm calculus in the right mid ureter with mild right hydronephrosis. Additional right intrarenal calculi measuring up to 7 mm. 8 mm nonobstructive left renal calculus. No left-sided hydroureteronephrosis.  - Start Flomax   - c/w Ceftriaxone D#1   - Strain urine   - Pain control: Tylenol, Morphine   - Zofran for nausea   - s/p 1L of NS in the ER, c/w 100 ml/hr   - NPO, advance diet as tolerated   - Urology consult - Dr. Ochoa      2. Urinary tract infection   - UA: positive nitrite, small leukocyte esterase, WBC 11-25, RBC 6-10, occasional bacteria  - c/w Ceftriaxone   - f/u UCx, BCx x 2     3. Leuckoytosis - resolved   - WBC 13.42, trend   - Tylenol for fevers PRN     4. Normocytic anemia  - Hb 12.7, monitor closely     5. Thrombocytopenia   - , trend     6. Elevated lactic acid   - Lactate 2.5, s/p 1500 ml of NS and continued on maintenance IVF   - Will reflex lactic acid   - c/w IVF     7. Elevated Cr - resolved TERRY   - Cr 1.49 (basline ~1.1), monitor closely   - Gentle hydration with IVF at 100 ml/hr (monitor for fluid overload)   - Avoid nephrotoxic medications   - Strict I+Os, daily weights     8. Hyperglycemia in the setting of Type 2 DM   - Glucose 124, monitor closely   - Ordered HbA1c   - NPO -> advance to Diabetic diet   - Moderate dose ISS, add on lantus and ademelog once tolerating diet     9. Hypercalcemia   - Calcium 10.8, f/u AM level  - May improve with IVF   - Ordered PTH, phosphorous, Vitamin D level     10. 1.1 cm indeterminate right upper pole hyperdensity, may represent a complex cyst versus mass   - Ordered MRI abd/pelvis contrast for evaluation     11. 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   Plan d/w patient. Case d/w team on IDR.
Called by RN to report positive blood cx: Gram positive cocci in clusters from 6/6. ID consult appreciated, possible contamination. Repeat blood cx pending.

## 2022-06-08 NOTE — PROGRESS NOTE ADULT - SUBJECTIVE AND OBJECTIVE BOX
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 Pt reports nausea, temperature of 100 F at home, burning on urination. He went to urgent care and UA showed significant blood and UTI. He was sent to the hospital for evaluation.    ER course: VSS. Labs: WBC 13.42, Hb 12.7, , Lactate 2.5, Cr 1.49 (baseline ~1.1), Glucose 124, Calcium 10.8. UA: trace ketones, positive nitrite, small leukocyte esterase, large blood, WBC 11-25, RBC 6-10, occasional bacteria, few crystals. COVID, influenza A/B, RVP negative. EKG: NSR with HR 73 bpm, normal intervals, no ST segment changes, no T wave inversions (personally reviewed).     Imaging:   - CT abd/pelvis: 4 mm calculus in the right mid ureter with mild right hydronephrosis. Additional right intrarenal calculi measuring up to 7 mm. 8 mm nonobstructive left renal calculus. No left-sided hydroureteronephrosis. 1.1 cm indeterminate right upper pole hyperdensity, may represent a complex cyst versus mass. Further evaluation with nonemergent contrast-enhanced MRI is recommended.    Pt was given Ceftriaxone, 1500 ml of NS, morphine, zofran, tylenol. He is being admitted to med/surg for further management.   Patient admitted for UTI and right ureteral stone. Urology consulted.     6/8-    Vital Signs Last 24 Hrs  T(C): 36.6 (08 Jun 2022 08:30), Max: 36.6 (08 Jun 2022 08:30)  T(F): 97.8 (08 Jun 2022 08:30), Max: 97.8 (08 Jun 2022 08:30)  HR: 78 (08 Jun 2022 08:30) (64 - 78)  BP: 124/68 (08 Jun 2022 08:30) (124/68 - 135/72)  BP(mean): --  RR: 18 (08 Jun 2022 08:30) (17 - 18)  SpO2: 96% (08 Jun 2022 08:30) (96% - 100%)    ROS:   All 10 systems reviewed and found to be negative with the exception of what has been described above.     PE:  Constitutional: NAD, laying in bed  HEENT: NC/AT  Back: no tenderness  Respiratory: respirations even and non labored, LCTA  Cardiovascular: S1S2 regular, no murmurs  Abdomen: soft, not tender, not distended, positive BS  Genitourinary: voiding  Musculoskeletal: no muscle tenderness, no joint swelling or tenderness  Extremities: no pedal edema   Neurological: no focal deficits    MEDICATIONS  (STANDING):  aspirin enteric coated 81 milliGRAM(s) Oral daily  atorvastatin 10 milliGRAM(s) Oral at bedtime  cefTRIAXone   IVPB 1000 milliGRAM(s) IV Intermittent every 24 hours  cholecalciferol 2000 Unit(s) Oral daily  dextrose 5%. 1000 milliLiter(s) (50 mL/Hr) IV Continuous <Continuous>  dextrose 5%. 1000 milliLiter(s) (100 mL/Hr) IV Continuous <Continuous>  dextrose 50% Injectable 25 Gram(s) IV Push once  dextrose 50% Injectable 12.5 Gram(s) IV Push once  dextrose 50% Injectable 25 Gram(s) IV Push once  enoxaparin Injectable 40 milliGRAM(s) SubCutaneous every 24 hours  fenofibrate Tablet 145 milliGRAM(s) Oral daily  glucagon  Injectable 1 milliGRAM(s) IntraMuscular once  insulin lispro (ADMELOG) corrective regimen sliding scale   SubCutaneous at bedtime  insulin lispro (ADMELOG) corrective regimen sliding scale   SubCutaneous three times a day before meals  levothyroxine 125 MICROGram(s) Oral daily  sodium chloride 0.9%. 1000 milliLiter(s) (100 mL/Hr) IV Continuous <Continuous>  tamsulosin 0.4 milliGRAM(s) Oral at bedtime    MEDICATIONS  (PRN):  acetaminophen     Tablet .. 650 milliGRAM(s) Oral every 6 hours PRN Temp greater or equal to 38C (100.4F), Mild Pain (1 - 3)  aluminum hydroxide/magnesium hydroxide/simethicone Suspension 30 milliLiter(s) Oral every 4 hours PRN Dyspepsia  dextrose Oral Gel 15 Gram(s) Oral once PRN Blood Glucose LESS THAN 70 milliGRAM(s)/deciliter  melatonin 3 milliGRAM(s) Oral at bedtime PRN Insomnia  morphine  - Injectable 2 milliGRAM(s) IV Push every 4 hours PRN Severe Pain (7 - 10)  ondansetron Injectable 4 milliGRAM(s) IV Push every 6 hours PRN Nausea and/or Vomiting      < from: CT Abdomen and Pelvis No Cont (06.06.22 @ 22:54) >  COMPARISON: CT abdomen pelvis 6/4/2013    CONTRAST/COMPLICATIONS:  IV Contrast: NONE  Oral Contrast: NONE  Complications: None reported at time of study completion    PROCEDURE:  CT of the Abdomen and Pelvis was performed in the prone position.  Sagittal and coronal reformats were performed.  FINDINGS:  LOWER CHEST: Within normal limits.  LIVER: Within normal limits.  BILE DUCTS: Normal caliber.  GALLBLADDER: Within normal limits.  SPLEEN: Within normal limits.  PANCREAS: Within normal limits.  ADRENALS: Within normal limits.  KIDNEYS/URETERS: 4 mm calculus in the right mid ureter with mild right   hydronephrosis. Additional right intrarenal calculi measuring up to 7 mm.  8 mm nonobstructive left renal calculus. No left-sided   hydroureteronephrosis.  There is a 1.1 cm indeterminate right upper pole hyperdensity.  BLADDER: No radiodense debris.  REPRODUCTIVE ORGANS: Prostate is not enlarged.  BOWEL: No bowel obstruction. Appendix is unremarkable.  PERITONEUM: No ascites.  VESSELS: Atherosclerosis.  RETROPERITONEUM/LYMPH NODES: No lymphadenopathy.  ABDOMINAL WALL: Within normal limits.  BONES: Within normal limits.  IMPRESSION:  4 mm calculus in the right mid ureter with mild right hydronephrosis.   Additional right intrarenal calculi measuring up to 7 mm.  8 mm nonobstructive left renal calculus. No left-sided   hydroureteronephrosis.  1.1 cm indeterminate right upper pole hyperdensity, may represent a   complex cyst versus mass. Further evaluation with nonemergent   contrast-enhanced MRI is recommended.    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 Pt reports nausea, temperature of 100 F at home, burning on urination. He went to urgent care and UA showed significant blood and UTI. He was sent to the hospital for evaluation.    ER course: VSS. Labs: WBC 13.42, Hb 12.7, , Lactate 2.5, Cr 1.49 (baseline ~1.1), Glucose 124, Calcium 10.8. UA: trace ketones, positive nitrite, small leukocyte esterase, large blood, WBC 11-25, RBC 6-10, occasional bacteria, few crystals. COVID, influenza A/B, RVP negative. EKG: NSR with HR 73 bpm, normal intervals, no ST segment changes, no T wave inversions (personally reviewed).     Imaging:   - CT abd/pelvis: 4 mm calculus in the right mid ureter with mild right hydronephrosis. Additional right intrarenal calculi measuring up to 7 mm. 8 mm nonobstructive left renal calculus. No left-sided hydroureteronephrosis. 1.1 cm indeterminate right upper pole hyperdensity, may represent a complex cyst versus mass. Further evaluation with nonemergent contrast-enhanced MRI is recommended.    Pt was given Ceftriaxone, 1500 ml of NS, morphine, zofran, tylenol. He is being admitted to med/surg for further management.   Patient admitted for UTI and right ureteral stone. Urology consulted.     6/8- no acute overnight events, denies pain, fever or chills. denies CVA tenderness.     Vital Signs Last 24 Hrs  T(C): 36.6 (08 Jun 2022 08:30), Max: 36.6 (08 Jun 2022 08:30)  T(F): 97.8 (08 Jun 2022 08:30), Max: 97.8 (08 Jun 2022 08:30)  HR: 78 (08 Jun 2022 08:30) (64 - 78)  BP: 124/68 (08 Jun 2022 08:30) (124/68 - 135/72)  BP(mean): --  RR: 18 (08 Jun 2022 08:30) (17 - 18)  SpO2: 96% (08 Jun 2022 08:30) (96% - 100%)    ROS:   All 10 systems reviewed and found to be negative with the exception of what has been described above.     PE:  Constitutional: NAD, laying in bed  HEENT: NC/AT  Back: no tenderness  Respiratory: respirations even and non labored, LCTA  Cardiovascular: S1S2 regular, no murmurs  Abdomen: soft, not tender, not distended, positive BS  Genitourinary: voiding  Musculoskeletal: no muscle tenderness, no joint swelling or tenderness  Extremities: no pedal edema   Neurological: no focal deficits    MEDICATIONS  (STANDING):  aspirin enteric coated 81 milliGRAM(s) Oral daily  atorvastatin 10 milliGRAM(s) Oral at bedtime  cefTRIAXone   IVPB 1000 milliGRAM(s) IV Intermittent every 24 hours  cholecalciferol 2000 Unit(s) Oral daily  dextrose 5%. 1000 milliLiter(s) (50 mL/Hr) IV Continuous <Continuous>  dextrose 5%. 1000 milliLiter(s) (100 mL/Hr) IV Continuous <Continuous>  dextrose 50% Injectable 25 Gram(s) IV Push once  dextrose 50% Injectable 12.5 Gram(s) IV Push once  dextrose 50% Injectable 25 Gram(s) IV Push once  enoxaparin Injectable 40 milliGRAM(s) SubCutaneous every 24 hours  fenofibrate Tablet 145 milliGRAM(s) Oral daily  glucagon  Injectable 1 milliGRAM(s) IntraMuscular once  insulin lispro (ADMELOG) corrective regimen sliding scale   SubCutaneous at bedtime  insulin lispro (ADMELOG) corrective regimen sliding scale   SubCutaneous three times a day before meals  levothyroxine 125 MICROGram(s) Oral daily  sodium chloride 0.9%. 1000 milliLiter(s) (100 mL/Hr) IV Continuous <Continuous>  tamsulosin 0.4 milliGRAM(s) Oral at bedtime    MEDICATIONS  (PRN):  acetaminophen     Tablet .. 650 milliGRAM(s) Oral every 6 hours PRN Temp greater or equal to 38C (100.4F), Mild Pain (1 - 3)  aluminum hydroxide/magnesium hydroxide/simethicone Suspension 30 milliLiter(s) Oral every 4 hours PRN Dyspepsia  dextrose Oral Gel 15 Gram(s) Oral once PRN Blood Glucose LESS THAN 70 milliGRAM(s)/deciliter  melatonin 3 milliGRAM(s) Oral at bedtime PRN Insomnia  morphine  - Injectable 2 milliGRAM(s) IV Push every 4 hours PRN Severe Pain (7 - 10)  ondansetron Injectable 4 milliGRAM(s) IV Push every 6 hours PRN Nausea and/or Vomiting      < from: CT Abdomen and Pelvis No Cont (06.06.22 @ 22:54) >  COMPARISON: CT abdomen pelvis 6/4/2013    CONTRAST/COMPLICATIONS:  IV Contrast: NONE  Oral Contrast: NONE  Complications: None reported at time of study completion    PROCEDURE:  CT of the Abdomen and Pelvis was performed in the prone position.  Sagittal and coronal reformats were performed.  FINDINGS:  LOWER CHEST: Within normal limits.  LIVER: Within normal limits.  BILE DUCTS: Normal caliber.  GALLBLADDER: Within normal limits.  SPLEEN: Within normal limits.  PANCREAS: Within normal limits.  ADRENALS: Within normal limits.  KIDNEYS/URETERS: 4 mm calculus in the right mid ureter with mild right   hydronephrosis. Additional right intrarenal calculi measuring up to 7 mm.  8 mm nonobstructive left renal calculus. No left-sided   hydroureteronephrosis.  There is a 1.1 cm indeterminate right upper pole hyperdensity.  BLADDER: No radiodense debris.  REPRODUCTIVE ORGANS: Prostate is not enlarged.  BOWEL: No bowel obstruction. Appendix is unremarkable.  PERITONEUM: No ascites.  VESSELS: Atherosclerosis.  RETROPERITONEUM/LYMPH NODES: No lymphadenopathy.  ABDOMINAL WALL: Within normal limits.  BONES: Within normal limits.  IMPRESSION:  4 mm calculus in the right mid ureter with mild right hydronephrosis.   Additional right intrarenal calculi measuring up to 7 mm.  8 mm nonobstructive left renal calculus. No left-sided   hydroureteronephrosis.  1.1 cm indeterminate right upper pole hyperdensity, may represent a   complex cyst versus mass. Further evaluation with nonemergent   contrast-enhanced MRI is recommended.    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 Pt reports nausea, temperature of 100 F at home, burning on urination. He went to urgent care and UA showed significant blood and UTI. He was sent to the hospital for evaluation.    ER course: VSS. Labs: WBC 13.42, Hb 12.7, , Lactate 2.5, Cr 1.49 (baseline ~1.1), Glucose 124, Calcium 10.8. UA: trace ketones, positive nitrite, small leukocyte esterase, large blood, WBC 11-25, RBC 6-10, occasional bacteria, few crystals. COVID, influenza A/B, RVP negative. EKG: NSR with HR 73 bpm, normal intervals, no ST segment changes, no T wave inversions (personally reviewed).     Imaging:   - CT abd/pelvis: 4 mm calculus in the right mid ureter with mild right hydronephrosis. Additional right intrarenal calculi measuring up to 7 mm. 8 mm nonobstructive left renal calculus. No left-sided hydroureteronephrosis. 1.1 cm indeterminate right upper pole hyperdensity, may represent a complex cyst versus mass. Further evaluation with nonemergent contrast-enhanced MRI is recommended.    Pt was given Ceftriaxone, 1500 ml of NS, morphine, zofran, tylenol. He is being admitted to med/surg for further management.   Patient admitted for UTI and right ureteral stone. Urology consulted.     6/8- no acute overnight events, denies pain, fever or chills. denies CVA tenderness. as per RN - patient passed 2 stones.     Vital Signs Last 24 Hrs  T(C): 36.6 (08 Jun 2022 08:30), Max: 36.6 (08 Jun 2022 08:30)  T(F): 97.8 (08 Jun 2022 08:30), Max: 97.8 (08 Jun 2022 08:30)  HR: 78 (08 Jun 2022 08:30) (64 - 78)  BP: 124/68 (08 Jun 2022 08:30) (124/68 - 135/72)  BP(mean): --  RR: 18 (08 Jun 2022 08:30) (17 - 18)  SpO2: 96% (08 Jun 2022 08:30) (96% - 100%)    ROS:   All 10 systems reviewed and found to be negative with the exception of what has been described above.     PE:  Constitutional: NAD, laying in bed  HEENT: NC/AT  Back: no tenderness  Respiratory: respirations even and non labored, LCTA  Cardiovascular: S1S2 regular, no murmurs  Abdomen: soft, not tender, not distended, positive BS  Genitourinary: voiding  Musculoskeletal: no muscle tenderness, no joint swelling or tenderness  Extremities: no pedal edema   Neurological: no focal deficits    MEDICATIONS  (STANDING):  aspirin enteric coated 81 milliGRAM(s) Oral daily  atorvastatin 10 milliGRAM(s) Oral at bedtime  cefTRIAXone   IVPB 1000 milliGRAM(s) IV Intermittent every 24 hours  cholecalciferol 2000 Unit(s) Oral daily  dextrose 5%. 1000 milliLiter(s) (50 mL/Hr) IV Continuous <Continuous>  dextrose 5%. 1000 milliLiter(s) (100 mL/Hr) IV Continuous <Continuous>  dextrose 50% Injectable 25 Gram(s) IV Push once  dextrose 50% Injectable 12.5 Gram(s) IV Push once  dextrose 50% Injectable 25 Gram(s) IV Push once  enoxaparin Injectable 40 milliGRAM(s) SubCutaneous every 24 hours  fenofibrate Tablet 145 milliGRAM(s) Oral daily  glucagon  Injectable 1 milliGRAM(s) IntraMuscular once  insulin lispro (ADMELOG) corrective regimen sliding scale   SubCutaneous at bedtime  insulin lispro (ADMELOG) corrective regimen sliding scale   SubCutaneous three times a day before meals  levothyroxine 125 MICROGram(s) Oral daily  sodium chloride 0.9%. 1000 milliLiter(s) (100 mL/Hr) IV Continuous <Continuous>  tamsulosin 0.4 milliGRAM(s) Oral at bedtime    MEDICATIONS  (PRN):  acetaminophen     Tablet .. 650 milliGRAM(s) Oral every 6 hours PRN Temp greater or equal to 38C (100.4F), Mild Pain (1 - 3)  aluminum hydroxide/magnesium hydroxide/simethicone Suspension 30 milliLiter(s) Oral every 4 hours PRN Dyspepsia  dextrose Oral Gel 15 Gram(s) Oral once PRN Blood Glucose LESS THAN 70 milliGRAM(s)/deciliter  melatonin 3 milliGRAM(s) Oral at bedtime PRN Insomnia  morphine  - Injectable 2 milliGRAM(s) IV Push every 4 hours PRN Severe Pain (7 - 10)  ondansetron Injectable 4 milliGRAM(s) IV Push every 6 hours PRN Nausea and/or Vomiting      < from: CT Abdomen and Pelvis No Cont (06.06.22 @ 22:54) >  COMPARISON: CT abdomen pelvis 6/4/2013    CONTRAST/COMPLICATIONS:  IV Contrast: NONE  Oral Contrast: NONE  Complications: None reported at time of study completion    PROCEDURE:  CT of the Abdomen and Pelvis was performed in the prone position.  Sagittal and coronal reformats were performed.  FINDINGS:  LOWER CHEST: Within normal limits.  LIVER: Within normal limits.  BILE DUCTS: Normal caliber.  GALLBLADDER: Within normal limits.  SPLEEN: Within normal limits.  PANCREAS: Within normal limits.  ADRENALS: Within normal limits.  KIDNEYS/URETERS: 4 mm calculus in the right mid ureter with mild right   hydronephrosis. Additional right intrarenal calculi measuring up to 7 mm.  8 mm nonobstructive left renal calculus. No left-sided   hydroureteronephrosis.  There is a 1.1 cm indeterminate right upper pole hyperdensity.  BLADDER: No radiodense debris.  REPRODUCTIVE ORGANS: Prostate is not enlarged.  BOWEL: No bowel obstruction. Appendix is unremarkable.  PERITONEUM: No ascites.  VESSELS: Atherosclerosis.  RETROPERITONEUM/LYMPH NODES: No lymphadenopathy.  ABDOMINAL WALL: Within normal limits.  BONES: Within normal limits.  IMPRESSION:  4 mm calculus in the right mid ureter with mild right hydronephrosis.   Additional right intrarenal calculi measuring up to 7 mm.  8 mm nonobstructive left renal calculus. No left-sided   hydroureteronephrosis.  1.1 cm indeterminate right upper pole hyperdensity, may represent a   complex cyst versus mass. Further evaluation with nonemergent   contrast-enhanced MRI is recommended.    < end of copied text >

## 2022-06-08 NOTE — CONSULT NOTE ADULT - SUBJECTIVE AND OBJECTIVE BOX
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 Pt reports nausea, temperature of 100 F at home, burning on urination. He went to urgent care and UA showed significant blood and UTI. He was sent to the hospital for evaluation. Denies fever, chills at home. In ER he received ceftriaxone.      PMH: as above  PSH: as above  Meds: per reconciliation sheet, noted below  MEDICATIONS  (STANDING):  aspirin enteric coated 81 milliGRAM(s) Oral daily  atorvastatin 10 milliGRAM(s) Oral at bedtime  cefTRIAXone   IVPB 1000 milliGRAM(s) IV Intermittent every 24 hours  cholecalciferol 2000 Unit(s) Oral daily  dextrose 5%. 1000 milliLiter(s) (50 mL/Hr) IV Continuous <Continuous>  dextrose 5%. 1000 milliLiter(s) (100 mL/Hr) IV Continuous <Continuous>  dextrose 50% Injectable 25 Gram(s) IV Push once  dextrose 50% Injectable 12.5 Gram(s) IV Push once  dextrose 50% Injectable 25 Gram(s) IV Push once  enoxaparin Injectable 40 milliGRAM(s) SubCutaneous every 24 hours  fenofibrate Tablet 145 milliGRAM(s) Oral daily  glucagon  Injectable 1 milliGRAM(s) IntraMuscular once  insulin lispro (ADMELOG) corrective regimen sliding scale   SubCutaneous three times a day before meals  insulin lispro (ADMELOG) corrective regimen sliding scale   SubCutaneous at bedtime  levothyroxine 125 MICROGram(s) Oral daily  sodium chloride 0.9%. 1000 milliLiter(s) (100 mL/Hr) IV Continuous <Continuous>  tamsulosin 0.4 milliGRAM(s) Oral at bedtime    MEDICATIONS  (PRN):  acetaminophen     Tablet .. 650 milliGRAM(s) Oral every 6 hours PRN Temp greater or equal to 38C (100.4F), Mild Pain (1 - 3)  aluminum hydroxide/magnesium hydroxide/simethicone Suspension 30 milliLiter(s) Oral every 4 hours PRN Dyspepsia  dextrose Oral Gel 15 Gram(s) Oral once PRN Blood Glucose LESS THAN 70 milliGRAM(s)/deciliter  melatonin 3 milliGRAM(s) Oral at bedtime PRN Insomnia  morphine  - Injectable 2 milliGRAM(s) IV Push every 4 hours PRN Severe Pain (7 - 10)  ondansetron Injectable 4 milliGRAM(s) IV Push every 6 hours PRN Nausea and/or Vomiting    Allergies    nafcillin (Unknown)    Intolerances      Social: no smoking, no alcohol, no illegal drugs; no recent travel, no exposure to TB  FAMILY HISTORY:  FH: Alzheimers disease (Mother)  FH: CAD (coronary artery disease) (Father)  no history of premature cardiovascular disease in first degree relatives    ROS: the patient denies fever, no chills, no HA, no seizures, no dizziness, no sore throat, no nasal congestion, no blurry vision, no CP, no palpitations, no SOB, no cough, no abdominal pain, no diarrhea, no N/V, has dysuria, no leg pain, no claudication, no rash, no joint aches, no rectal pain or bleeding, no night sweats  All other systems reviewed and are negative    Vital Signs Last 24 Hrs  T(C): 36.6 (2022 08:30), Max: 36.6 (2022 08:30)  T(F): 97.8 (2022 08:30), Max: 97.8 (2022 08:30)  HR: 78 (2022 08:30) (64 - 78)  BP: 124/68 (2022 08:30) (124/68 - 135/72)  BP(mean): --  RR: 18 (2022 08:30) (17 - 18)  SpO2: 96% (2022 08:30) (96% - 100%)  Daily     Daily     PE:    Constitutional:  No acute distress  HEENT: NC/AT, EOMI, PERRLA, conjunctivae clear; ears and nose atraumatic; pharynx benign  Neck: supple; thyroid not palpable  Back: no tenderness  Respiratory: respiratory effort normal; clear to auscultation  Cardiovascular: S1S2 regular, no murmurs  Abdomen: soft, not tender, not distended, positive BS; no liver or spleen organomegaly  Genitourinary: no suprapubic tenderness  has left flank pain  Lymphatic: no LN palpable  Musculoskeletal: no muscle tenderness, no joint swelling or tenderness  Extremities: no pedal edema  Neurological/ Psychiatric: AxOx3, judgement and insight normal; moving all extremities  Skin: no rashes; no palpable lesions    Labs: all available labs reviewed                        11.1   4.99  )-----------( 127      ( 2022 07:27 )             33.2     06-08    136  |  106  |  9   ----------------------------<  142<H>  4.0   |  24  |  1.29    Ca    9.3      2022 07:27  Phos  3.2     06-07    TPro  6.7  /  Alb  3.5  /  TBili  0.8  /  DBili  x   /  AST  19  /  ALT  21  /  AlkPhos  35<L>  06     LIVER FUNCTIONS - ( 2022 06:50 )  Alb: 3.5 g/dL / Pro: 6.7 gm/dL / ALK PHOS: 35 U/L / ALT: 21 U/L / AST: 19 U/L / GGT: x           Urinalysis Basic - ( 2022 22:31 )    Color: Yellow / Appearance: Clear / S.025 / pH: x  Gluc: x / Ketone: Trace  / Bili: Negative / Urobili: Negative   Blood: x / Protein: 30 mg/dL / Nitrite: Positive   Leuk Esterase: Small / RBC: 6-10 /HPF / WBC 11-25   Sq Epi: x / Non Sq Epi: x / Bacteria: Occasional      Culture - Blood (collected 2022 22:38)  Source: .Blood None  Gram Stain (2022 09:31):    Growth in aerobic bottle: Gram Positive Cocci in Clusters  Preliminary Report (2022 09:32):    Growth in aerobic bottle: Gram Positive Cocci in Clusters  Organism: Blood Culture PCR (2022 12:06)      -  Staphylococcus epidermidis: Detec      Method Type: PCR    Culture - Blood (collected 2022 22:38)  Source: .Blood None  Preliminary Report (2022 10:01):    No growth to date.    Culture - Urine (collected 2022 22:31)  Source: Clean Catch Clean Catch (Midstream)  Final Report (2022 14:04):    50,000 - 99,000 CFU/mL Coag Negative Staphylococcus "Susceptibilities not    performed"    Radiology: all available radiological tests reviewed    < from: CT Abdomen and Pelvis No Cont (22 @ 22:54) >  4 mm calculus in the right mid ureter with mild right hydronephrosis. Additional right intrarenal calculi measuring up to 7 mm.  8 mm nonobstructive left renal calculus. No left-sided hydroureteronephrosis.  1.1 cm indeterminate right upper pole hyperdensity, may represent a   complex cyst versus mass. Further evaluation with nonemergent contrast-enhanced MRI is recommended.  < end of copied text >      Advanced directives addressed: full resuscitation
CHIEF COMPLAINT: right renal colic, uti    HISTORY OF PRESENT ILLNESS: pt develops right renal colic. CT reports 4 mm mid right ureteral stone. There is also a left intrarenal stone. Ua consistent with uti and culture sensitivities pending. wbc 7.88, Cr 1.2, lactate improved to 1.6 from 2.5. Pt currently comfortable. There is min/no right cvat. No nausea nor vomiting. No fever    PAST MEDICAL & SURGICAL HISTORY:  HLD (hyperlipidemia)      DM (diabetes mellitus), type 2      Kidney stones      Hypothyroidism      History of staph infection  lumbar spine      History of back surgery          REVIEW OF SYSTEMS:Same previous  MEDICATIONS  (STANDING):  aspirin enteric coated 81 milliGRAM(s) Oral daily  atorvastatin 10 milliGRAM(s) Oral at bedtime  cefTRIAXone   IVPB 1000 milliGRAM(s) IV Intermittent every 24 hours  cholecalciferol 2000 Unit(s) Oral daily  dextrose 5%. 1000 milliLiter(s) (50 mL/Hr) IV Continuous <Continuous>  dextrose 5%. 1000 milliLiter(s) (100 mL/Hr) IV Continuous <Continuous>  dextrose 50% Injectable 25 Gram(s) IV Push once  dextrose 50% Injectable 12.5 Gram(s) IV Push once  dextrose 50% Injectable 25 Gram(s) IV Push once  enoxaparin Injectable 40 milliGRAM(s) SubCutaneous every 24 hours  fenofibrate Tablet 145 milliGRAM(s) Oral daily  glucagon  Injectable 1 milliGRAM(s) IntraMuscular once  insulin lispro (ADMELOG) corrective regimen sliding scale   SubCutaneous every 6 hours  levothyroxine 125 MICROGram(s) Oral daily  sodium chloride 0.9%. 1000 milliLiter(s) (100 mL/Hr) IV Continuous <Continuous>  tamsulosin 0.4 milliGRAM(s) Oral at bedtime    MEDICATIONS  (PRN):  acetaminophen     Tablet .. 650 milliGRAM(s) Oral every 6 hours PRN Temp greater or equal to 38C (100.4F), Mild Pain (1 - 3)  aluminum hydroxide/magnesium hydroxide/simethicone Suspension 30 milliLiter(s) Oral every 4 hours PRN Dyspepsia  dextrose Oral Gel 15 Gram(s) Oral once PRN Blood Glucose LESS THAN 70 milliGRAM(s)/deciliter  melatonin 3 milliGRAM(s) Oral at bedtime PRN Insomnia  morphine  - Injectable 2 milliGRAM(s) IV Push every 4 hours PRN Severe Pain (7 - 10)  ondansetron Injectable 4 milliGRAM(s) IV Push every 6 hours PRN Nausea and/or Vomiting      PE: no cvat nor suprapubic tenderness nor left lower quadrant pain to palpation.    Allergies    nafcillin (Unknown)    Intolerances        SOCIAL HISTORY:Same previous    FAMILY HISTORY:  FH: Alzheimers disease (Mother)    FH: CAD (coronary artery disease) (Father)        Vital Signs Last 24 Hrs  T(C): 36.6 (2022 08:01), Max: 36.9 (2022 01:45)  T(F): 97.8 (2022 08:01), Max: 98.5 (2022 01:45)  HR: 64 (2022 08:01) (64 - 96)  BP: 127/84 (2022 08:01) (127/84 - 147/78)  BP(mean): 96 (2022 02:22) (92 - 96)  RR: 18 (2022 08:01) (17 - 18)  SpO2: 98% (2022 08:01) (96% - 98%)        LABS:                        10.7   7.88  )-----------( 120      ( 2022 06:50 )             32.6     06-07    137  |  107  |  15  ----------------------------<  146<H>  3.8   |  20<L>  |  1.20    Ca    9.3      2022 06:50  Phos  3.2     06-07    TPro  6.7  /  Alb  3.5  /  TBili  0.8  /  DBili  x   /  AST  19  /  ALT  21  /  AlkPhos  35<L>  06-07      Urinalysis Basic - ( 2022 22:31 )    Color: Yellow / Appearance: Clear / S.025 / pH: x  Gluc: x / Ketone: Trace  / Bili: Negative / Urobili: Negative   Blood: x / Protein: 30 mg/dL / Nitrite: Positive   Leuk Esterase: Small / RBC: 6-10 /HPF / WBC 11-25   Sq Epi: x / Non Sq Epi: x / Bacteria: Occasional      Urine Culture:     RADIOLOGY & ADDITIONAL STUDIES:

## 2022-06-08 NOTE — PROVIDER CONTACT NOTE (CRITICAL VALUE NOTIFICATION) - TEST AND RESULT REPORTED:
Lactate 2.5
Blood culture collected 6/6/2022 preliminary
blood culture from 6/6/22 growth in anaerobic bottle  gram + cocci in clusters

## 2022-06-08 NOTE — PROVIDER CONTACT NOTE (CRITICAL VALUE NOTIFICATION) - ASSESSMENT
Blood culture collected 6/6/2022 preliminary result growth in aerobic bottle, gram positive cocci in clusters
No s/s of distress.

## 2022-06-08 NOTE — PROGRESS NOTE ADULT - ASSESSMENT
PLAN   1. 4 mm calculus in the right mid ureter with mild right hydronephrosis  - CT scan: 4 mm calculus in the right mid ureter with mild right hydronephrosis. Additional right intrarenal calculi measuring up to 7 mm. 8 mm nonobstructive left renal calculus. No left-sided hydroureteronephrosis.  - Start Flomax   - c/w Ceftriaxone D#2  - Strain urine   - Pain control: Tylenol, Morphine   - Zofran for nausea   - s/p 1L of NS in the ER, c/w 100 ml/hr  - Urology consult, d/w Dr Ochoa- no acuate urological intervention - f/u as an outpatient in his office       2. Urinary tract infection   - UA: positive nitrite, small leukocyte esterase, WBC 11-25, RBC 6-10, occasional bacteria  - c/w Ceftriaxone   - f/u UCx, BCx x 2 - pending     3. Leukocytosis - resolved   - Tylenol for fevers PRN     4. Normocytic anemia  - Hb 12.7, monitor closely     5. Thrombocytopenia   - , trend     6. Elevated lactic acid- improved    - Lactate 2.5, s/p 1500 ml of NS and continued on maintenance IVF   - c/w IVF     7. Elevated Cr - resolved TERRY   - Cr 1.49 (basline ~1.1), monitor closely   - Gentle hydration with IVF at 100 ml/hr (monitor for fluid overload)   - Avoid nephrotoxic medications   - Strict I+Os, daily weights     8. Hyperglycemia in the setting of Type 2 DM   - Glucose 124, monitor closely   - Ordered HbA1c 6.6   - NPO -> advance to Diabetic diet   - Moderate dose ISS, add on lantus and ademelog once tolerating diet     9. Hypercalcemia - resolved   - Calcium 10.8, f/u AM level  - Ordered PTH, phosphorous, Vitamin D level     10. 1.1 cm indeterminate right upper pole hyperdensity, may represent a complex cyst versus mass   - Ordered MRI abd/pelvis contrast for evaluation     11. 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   Plan d/w patient. Case d/w team on IDR. PLAN   1. 4 mm calculus in the right mid ureter with mild right hydronephrosis  - CT scan: 4 mm calculus in the right mid ureter with mild right hydronephrosis. Additional right intrarenal calculi measuring up to 7 mm. 8 mm nonobstructive left renal calculus. No left-sided hydroureteronephrosis.  - Start Flomax   - c/w Ceftriaxone D#2  - Strain urine   - Pain control: Tylenol, Morphine   - Zofran for nausea   - s/p 1L of NS in the ER, c/w 100 ml/hr  - Urology consult, d/w Dr Ochoa- no acuate urological intervention - f/u as an outpatient in his office       2. Urinary tract infection   - UA: positive nitrite, small leukocyte esterase, WBC 11-25, RBC 6-10, occasional bacteria  - c/w Ceftriaxone   - f/u UCx, BCx x 2 - BC preliminary aerobic bottle - GPC in clusters  - ID consult    3. Leukocytosis - resolved   - Tylenol for fevers PRN     4. Normocytic anemia  - Hb 12.7, monitor closely     5. Thrombocytopenia   - , trend     6. Elevated lactic acid- improved    - Lactate 2.5, s/p 1500 ml of NS and continued on maintenance IVF   - c/w IVF     7. Elevated Cr - resolved TERRY   - Cr 1.49 (basline ~1.1), monitor closely   - Gentle hydration with IVF at 100 ml/hr (monitor for fluid overload)   - Avoid nephrotoxic medications   - Strict I+Os, daily weights     8. Hyperglycemia in the setting of Type 2 DM   - Glucose 124, monitor closely   - Ordered HbA1c 6.6   - NPO -> advance to Diabetic diet   - Moderate dose ISS, add on lantus and ademelog once tolerating diet     9. Hypercalcemia - resolved   - Calcium 10.8, f/u AM level  - Ordered PTH, phosphorous, Vitamin D level     10. 1.1 cm indeterminate right upper pole hyperdensity, may represent a complex cyst versus mass   - Ordered MRI abd/pelvis contrast for evaluation     11. 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   Plan d/w patient. Case d/w team on IDR.

## 2022-06-08 NOTE — PROVIDER CONTACT NOTE (CRITICAL VALUE NOTIFICATION) - ACTION/TREATMENT ORDERED:
No new orders.  IV antibiotics will cover growth in blood cultures.  ID following
NP aware. No intervention or change in antibx at this time.
Dr. Sewell made aware.

## 2022-06-08 NOTE — PROVIDER CONTACT NOTE (CRITICAL VALUE NOTIFICATION) - SITUATION
Blood culture collected 6/6/2022 preliminary result growth in aerobic bottle, gram positive cocci in clusters
Pt A&Ox4. VS stable.
+ blood culture from 6/6  growing gram + cocci in clusters in the anaerobic bottle

## 2022-06-09 LAB
CULTURE RESULTS: SIGNIFICANT CHANGE UP
ORGANISM # SPEC MICROSCOPIC CNT: SIGNIFICANT CHANGE UP
ORGANISM # SPEC MICROSCOPIC CNT: SIGNIFICANT CHANGE UP
SPECIMEN SOURCE: SIGNIFICANT CHANGE UP

## 2022-06-09 PROCEDURE — 99232 SBSQ HOSP IP/OBS MODERATE 35: CPT

## 2022-06-09 RX ORDER — VANCOMYCIN HCL 1 G
1000 VIAL (EA) INTRAVENOUS EVERY 12 HOURS
Refills: 0 | Status: DISCONTINUED | OUTPATIENT
Start: 2022-06-09 | End: 2022-06-10

## 2022-06-09 RX ORDER — SENNA PLUS 8.6 MG/1
2 TABLET ORAL AT BEDTIME
Refills: 0 | Status: DISCONTINUED | OUTPATIENT
Start: 2022-06-09 | End: 2022-06-10

## 2022-06-09 RX ADMIN — Medication 81 MILLIGRAM(S): at 10:57

## 2022-06-09 RX ADMIN — Medication 2: at 08:05

## 2022-06-09 RX ADMIN — Medication 250 MILLIGRAM(S): at 21:26

## 2022-06-09 RX ADMIN — Medication 2: at 11:42

## 2022-06-09 RX ADMIN — ENOXAPARIN SODIUM 40 MILLIGRAM(S): 100 INJECTION SUBCUTANEOUS at 11:01

## 2022-06-09 RX ADMIN — Medication 2000 UNIT(S): at 10:57

## 2022-06-09 RX ADMIN — ATORVASTATIN CALCIUM 10 MILLIGRAM(S): 80 TABLET, FILM COATED ORAL at 21:25

## 2022-06-09 RX ADMIN — SENNA PLUS 2 TABLET(S): 8.6 TABLET ORAL at 22:10

## 2022-06-09 RX ADMIN — Medication 145 MILLIGRAM(S): at 10:56

## 2022-06-09 RX ADMIN — TAMSULOSIN HYDROCHLORIDE 0.4 MILLIGRAM(S): 0.4 CAPSULE ORAL at 21:25

## 2022-06-09 RX ADMIN — Medication 125 MICROGRAM(S): at 06:10

## 2022-06-09 NOTE — PROGRESS NOTE ADULT - SUBJECTIVE AND OBJECTIVE BOX
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 Pt reports nausea, temperature of 100 F at home, burning on urination. He went to urgent care and UA showed significant blood and UTI. He was sent to the hospital for evaluation.    ER course: VSS. Labs: WBC 13.42, Hb 12.7, , Lactate 2.5, Cr 1.49 (baseline ~1.1), Glucose 124, Calcium 10.8. UA: trace ketones, positive nitrite, small leukocyte esterase, large blood, WBC 11-25, RBC 6-10, occasional bacteria, few crystals. COVID, influenza A/B, RVP negative. EKG: NSR with HR 73 bpm, normal intervals, no ST segment changes, no T wave inversions (personally reviewed).     Imaging:   - CT abd/pelvis: 4 mm calculus in the right mid ureter with mild right hydronephrosis. Additional right intrarenal calculi measuring up to 7 mm. 8 mm nonobstructive left renal calculus. No left-sided hydroureteronephrosis. 1.1 cm indeterminate right upper pole hyperdensity, may represent a complex cyst versus mass. Further evaluation with nonemergent contrast-enhanced MRI is recommended.    Pt was given Ceftriaxone, 1500 ml of NS, morphine, zofran, tylenol. He is being admitted to med/surg for further management.   Patient admitted for UTI and right ureteral stone. Urology consulted.     6/8- no acute overnight events, denies pain, fever or chills. denies CVA tenderness. as per RN - patient passed 2 stones.   6/9-     Vital Signs Last 24 Hrs  T(C): 36.2 (09 Jun 2022 07:14), Max: 36.7 (08 Jun 2022 19:50)  T(F): 97.2 (09 Jun 2022 07:14), Max: 98.1 (08 Jun 2022 19:50)  HR: 62 (09 Jun 2022 07:14) (62 - 78)  BP: 118/71 (09 Jun 2022 07:14) (118/71 - 128/69)  BP(mean): --  RR: 18 (09 Jun 2022 07:14) (18 - 18)  SpO2: 97% (09 Jun 2022 07:14) (96% - 99%)    ROS:   All 10 systems reviewed and found to be negative with the exception of what has been described above.     PE:  Constitutional: NAD, laying in bed  HEENT: NC/AT  Back: no tenderness  Respiratory: respirations even and non labored, LCTA  Cardiovascular: S1S2 regular, no murmurs  Abdomen: soft, not tender, not distended, positive BS  Genitourinary: voiding  Musculoskeletal: no muscle tenderness, no joint swelling or tenderness  Extremities: no pedal edema   Neurological: no focal deficits    MEDICATIONS  (STANDING):  aspirin enteric coated 81 milliGRAM(s) Oral daily  atorvastatin 10 milliGRAM(s) Oral at bedtime  cefTRIAXone   IVPB 1000 milliGRAM(s) IV Intermittent every 24 hours  cholecalciferol 2000 Unit(s) Oral daily  dextrose 5%. 1000 milliLiter(s) (50 mL/Hr) IV Continuous <Continuous>  dextrose 5%. 1000 milliLiter(s) (100 mL/Hr) IV Continuous <Continuous>  dextrose 50% Injectable 25 Gram(s) IV Push once  dextrose 50% Injectable 12.5 Gram(s) IV Push once  dextrose 50% Injectable 25 Gram(s) IV Push once  enoxaparin Injectable 40 milliGRAM(s) SubCutaneous every 24 hours  fenofibrate Tablet 145 milliGRAM(s) Oral daily  glucagon  Injectable 1 milliGRAM(s) IntraMuscular once  insulin lispro (ADMELOG) corrective regimen sliding scale   SubCutaneous at bedtime  insulin lispro (ADMELOG) corrective regimen sliding scale   SubCutaneous three times a day before meals  levothyroxine 125 MICROGram(s) Oral daily  sodium chloride 0.9%. 1000 milliLiter(s) (100 mL/Hr) IV Continuous <Continuous>  tamsulosin 0.4 milliGRAM(s) Oral at bedtime    MEDICATIONS  (PRN):  acetaminophen     Tablet .. 650 milliGRAM(s) Oral every 6 hours PRN Temp greater or equal to 38C (100.4F), Mild Pain (1 - 3)  aluminum hydroxide/magnesium hydroxide/simethicone Suspension 30 milliLiter(s) Oral every 4 hours PRN Dyspepsia  dextrose Oral Gel 15 Gram(s) Oral once PRN Blood Glucose LESS THAN 70 milliGRAM(s)/deciliter  melatonin 3 milliGRAM(s) Oral at bedtime PRN Insomnia  morphine  - Injectable 2 milliGRAM(s) IV Push every 4 hours PRN Severe Pain (7 - 10)  ondansetron Injectable 4 milliGRAM(s) IV Push every 6 hours PRN Nausea and/or Vomiting      < from: CT Abdomen and Pelvis No Cont (06.06.22 @ 22:54) >  COMPARISON: CT abdomen pelvis 6/4/2013    CONTRAST/COMPLICATIONS:  IV Contrast: NONE  Oral Contrast: NONE  Complications: None reported at time of study completion    PROCEDURE:  CT of the Abdomen and Pelvis was performed in the prone position.  Sagittal and coronal reformats were performed.  FINDINGS:  LOWER CHEST: Within normal limits.  LIVER: Within normal limits.  BILE DUCTS: Normal caliber.  GALLBLADDER: Within normal limits.  SPLEEN: Within normal limits.  PANCREAS: Within normal limits.  ADRENALS: Within normal limits.  KIDNEYS/URETERS: 4 mm calculus in the right mid ureter with mild right   hydronephrosis. Additional right intrarenal calculi measuring up to 7 mm.  8 mm nonobstructive left renal calculus. No left-sided   hydroureteronephrosis.  There is a 1.1 cm indeterminate right upper pole hyperdensity.  BLADDER: No radiodense debris.  REPRODUCTIVE ORGANS: Prostate is not enlarged.  BOWEL: No bowel obstruction. Appendix is unremarkable.  PERITONEUM: No ascites.  VESSELS: Atherosclerosis.  RETROPERITONEUM/LYMPH NODES: No lymphadenopathy.  ABDOMINAL WALL: Within normal limits.  BONES: Within normal limits.  IMPRESSION:  4 mm calculus in the right mid ureter with mild right hydronephrosis.   Additional right intrarenal calculi measuring up to 7 mm.  8 mm nonobstructive left renal calculus. No left-sided   hydroureteronephrosis.  1.1 cm indeterminate right upper pole hyperdensity, may represent a   complex cyst versus mass. Further evaluation with nonemergent   contrast-enhanced MRI is recommended.    < end of copied text >

## 2022-06-09 NOTE — PROGRESS NOTE ADULT - ASSESSMENT
PLAN   1. 4 mm calculus in the right mid ureter with mild right hydronephrosis  - CT scan: 4 mm calculus in the right mid ureter with mild right hydronephrosis. Additional right intrarenal calculi measuring up to 7 mm. 8 mm nonobstructive left renal calculus. No left-sided hydroureteronephrosis.  - Start Flomax   - c/w Ceftriaxone D#3  - Strain urine   - Pain control: Tylenol, Morphine   - Zofran for nausea   - s/p 1L of NS in the ER, c/w 100 ml/hr  - Urology consult, d/w Dr Ochoa- no acuate urological intervention - f/u as an outpatient in his office       2. Urinary tract infection   - UA: positive nitrite, small leukocyte esterase, WBC 11-25, RBC 6-10, occasional bacteria  - c/w Ceftriaxone D#3  - f/u UCx, BCx x 2 - BC preliminary aerobic bottle and anaerobic  - GPC in clusters- staph epidermidis  - Urine culture showing Coag negative Staph  - ID consult    3. Leukocytosis - resolved   - Tylenol for fevers PRN     4. Normocytic anemia  - Hb 12.7, monitor closely     5. Thrombocytopenia   - , trend     6. Elevated lactic acid- improved    - Lactate 2.5, s/p 1500 ml of NS and continued on maintenance IVF   - c/w IVF     7. Elevated Cr - resolved TERRY   - Cr 1.49 (basline ~1.1), monitor closely   - Gentle hydration with IVF at 100 ml/hr (monitor for fluid overload)   - Avoid nephrotoxic medications   - Strict I+Os, daily weights     8. Hyperglycemia in the setting of Type 2 DM   - Glucose 124, monitor closely   - Ordered HbA1c 6.6   - NPO -> advance to Diabetic diet   - Moderate dose ISS, add on lantus and ademelog once tolerating diet     9. Hypercalcemia - resolved   - Calcium 10.8, f/u AM level  - Ordered PTH, phosphorous, Vitamin D level     10. 1.1 cm indeterminate right upper pole hyperdensity, may represent a complex cyst versus mass   - Ordered MRI abd/pelvis contrast for evaluation   MRI showed - 13 mm right renal hemorrhagic cyst which corresponds to the indeterminate   lesion described on CT scan.7 mm cystic pancreatic lesion for which follow-up MRI is recommended in 2   years as per [employer] recommendations.  Fatty liver.      11. 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   Plan d/w patient. Case d/w team on IDR.

## 2022-06-09 NOTE — PROGRESS NOTE ADULT - ASSESSMENT
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 Pt reports nausea, temperature of 100 F at home, burning on urination. He went to urgent care and UA showed significant blood and UTI. He was sent to the hospital for evaluation. Denies fever, chills at home. In ER he received ceftriaxone.     1. Pyuria. UTI with CNST. Sepsis with CNST. Kidney stones. CRF stage 3.   -urine culture and BC (in all 4 bottles) growing CNST - likely true pathogen  -on ceftriaxone 1 gm IV qd # 2  -tolerating abx well so far; no side effects noted  -change abx to vancomycin 1 gm IV q12h  -reason for abx use and side effects reviewed with patient; monitor BMP and vancomycin level   -repeat BC x 2 collected  -urology evaluation appreciated  -continue abx coverage   -f/u cultures  -monitor temps  -f/u CBC  -supportive care  2. Other issues:   -care per medicine

## 2022-06-09 NOTE — PROGRESS NOTE ADULT - SUBJECTIVE AND OBJECTIVE BOX
Date of service: 22 @ 14:39    Lying in bed in NAD  Feels better  Has urinary frequency  Passed more stones    ROS: no fever or chills; denies dizziness, no HA, no SOB or cough, no abdominal pain, no diarrhea or constipation; no legs pain, no rashes    MEDICATIONS  (STANDING):  aspirin enteric coated 81 milliGRAM(s) Oral daily  atorvastatin 10 milliGRAM(s) Oral at bedtime  cholecalciferol 2000 Unit(s) Oral daily  dextrose 5%. 1000 milliLiter(s) (50 mL/Hr) IV Continuous <Continuous>  dextrose 5%. 1000 milliLiter(s) (100 mL/Hr) IV Continuous <Continuous>  dextrose 50% Injectable 25 Gram(s) IV Push once  dextrose 50% Injectable 12.5 Gram(s) IV Push once  dextrose 50% Injectable 25 Gram(s) IV Push once  enoxaparin Injectable 40 milliGRAM(s) SubCutaneous every 24 hours  fenofibrate Tablet 145 milliGRAM(s) Oral daily  glucagon  Injectable 1 milliGRAM(s) IntraMuscular once  insulin lispro (ADMELOG) corrective regimen sliding scale   SubCutaneous three times a day before meals  insulin lispro (ADMELOG) corrective regimen sliding scale   SubCutaneous at bedtime  levothyroxine 125 MICROGram(s) Oral daily  sodium chloride 0.9%. 1000 milliLiter(s) (100 mL/Hr) IV Continuous <Continuous>  tamsulosin 0.4 milliGRAM(s) Oral at bedtime  vancomycin  IVPB 1000 milliGRAM(s) IV Intermittent every 12 hours    Vital Signs Last 24 Hrs  T(C): 36.2 (2022 07:14), Max: 36.7 (2022 19:50)  T(F): 97.2 (2022 07:14), Max: 98.1 (2022 19:50)  HR: 62 (2022 07:14) (62 - 75)  BP: 118/71 (2022 07:14) (118/71 - 128/69)  BP(mean): --  RR: 18 (2022 07:14) (18 - 18)  SpO2: 97% (2022 07:14) (97% - 99%)     Physical exam:    Constitutional:  No acute distress  HEENT: NC/AT, EOMI, PERRLA, conjunctivae clear; ears and nose atraumatic  Neck: supple; thyroid not palpable  Back: no tenderness  Respiratory: respiratory effort normal; clear to auscultation  Cardiovascular: S1S2 regular, no murmurs  Abdomen: soft, not tender, not distended, positive BS  Genitourinary: no suprapubic tenderness  has left flank pain  Lymphatic: no LN palpable  Musculoskeletal: no muscle tenderness, no joint swelling or tenderness  Extremities: no pedal edema  Neurological/ Psychiatric: AxOx3, moving all extremities  Skin: no rashes; no palpable lesions    Labs: all available labs reviewed                        11.1   4.99  )-----------( 127      ( 2022 07:27 )             33.2     06-08    136  |  106  |  9   ----------------------------<  142<H>  4.0   |  24  |  1.29    Ca    9.3      2022 07:27  Phos  3.2     06-07    TPro  6.7  /  Alb  3.5  /  TBili  0.8  /  DBili  x   /  AST  19  /  ALT  21  /  AlkPhos  35<L>  06-07     LIVER FUNCTIONS - ( 2022 06:50 )  Alb: 3.5 g/dL / Pro: 6.7 gm/dL / ALK PHOS: 35 U/L / ALT: 21 U/L / AST: 19 U/L / GGT: x           Urinalysis Basic - ( 2022 22:31 )    Color: Yellow / Appearance: Clear / S.025 / pH: x  Gluc: x / Ketone: Trace  / Bili: Negative / Urobili: Negative   Blood: x / Protein: 30 mg/dL / Nitrite: Positive   Leuk Esterase: Small / RBC: 6-10 /HPF / WBC 11-25   Sq Epi: x / Non Sq Epi: x / Bacteria: Occasional      Culture - Blood (collected 2022 22:38)  Source: .Blood None  Gram Stain (2022 15:24):    Growth in aerobic bottle: Gram Positive Cocci in Clusters    Growth in anaerobic bottle: Gram Positive Cocci in Clusters  Preliminary Report (2022 12:33):    Growth in aerobic and anaerobic bottles: Staphylococcus epidermidis    Coag Negative Staphylococcus  Organism: Blood Culture PCR (2022 12:06)      -  Staphylococcus epidermidis: Detec      Method Type: PCR    Culture - Blood (collected 2022 22:38)  Source: .Blood None  Gram Stain (2022 17:39):    Growth in anaerobic bottle: Gram Positive Cocci in Clusters  Preliminary Report (2022 17:40):    Growth in anaerobic bottle: Gram Positive Cocci in Clusters    Culture - Urine (collected 2022 22:31)  Source: Clean Catch Clean Catch (Midstream)  Final Report (2022 14:04):    50,000 - 99,000 CFU/mL Coag Negative Staphylococcus "Susceptibilities not    performed"        Radiology: all available radiological tests reviewed    < from: CT Abdomen and Pelvis No Cont (22 @ 22:54) >  4 mm calculus in the right mid ureter with mild right hydronephrosis. Additional right intrarenal calculi measuring up to 7 mm.  8 mm nonobstructive left renal calculus. No left-sided hydroureteronephrosis.  1.1 cm indeterminate right upper pole hyperdensity, may represent a   complex cyst versus mass. Further evaluation with nonemergent contrast-enhanced MRI is recommended.  < end of copied text >      Advanced directives addressed: full resuscitation

## 2022-06-10 ENCOUNTER — TRANSCRIPTION ENCOUNTER (OUTPATIENT)
Age: 67
End: 2022-06-10

## 2022-06-10 VITALS
SYSTOLIC BLOOD PRESSURE: 120 MMHG | TEMPERATURE: 97 F | DIASTOLIC BLOOD PRESSURE: 77 MMHG | RESPIRATION RATE: 18 BRPM | OXYGEN SATURATION: 99 % | HEART RATE: 61 BPM

## 2022-06-10 LAB
-  AMPICILLIN/SULBACTAM: SIGNIFICANT CHANGE UP
-  CEFAZOLIN: SIGNIFICANT CHANGE UP
-  CLINDAMYCIN: SIGNIFICANT CHANGE UP
-  ERYTHROMYCIN: SIGNIFICANT CHANGE UP
-  GENTAMICIN: SIGNIFICANT CHANGE UP
-  OXACILLIN: SIGNIFICANT CHANGE UP
-  PENICILLIN: SIGNIFICANT CHANGE UP
-  RIFAMPIN: SIGNIFICANT CHANGE UP
-  TETRACYCLINE: SIGNIFICANT CHANGE UP
-  TRIMETHOPRIM/SULFAMETHOXAZOLE: SIGNIFICANT CHANGE UP
-  VANCOMYCIN: SIGNIFICANT CHANGE UP
ANION GAP SERPL CALC-SCNC: 9 MMOL/L — SIGNIFICANT CHANGE UP (ref 5–17)
BUN SERPL-MCNC: 15 MG/DL — SIGNIFICANT CHANGE UP (ref 7–23)
CALCIUM SERPL-MCNC: 9.9 MG/DL — SIGNIFICANT CHANGE UP (ref 8.5–10.1)
CHLORIDE SERPL-SCNC: 104 MMOL/L — SIGNIFICANT CHANGE UP (ref 96–108)
CO2 SERPL-SCNC: 23 MMOL/L — SIGNIFICANT CHANGE UP (ref 22–31)
CREAT SERPL-MCNC: 1.33 MG/DL — HIGH (ref 0.5–1.3)
CULTURE RESULTS: SIGNIFICANT CHANGE UP
EGFR: 58 ML/MIN/1.73M2 — LOW
GLUCOSE SERPL-MCNC: 201 MG/DL — HIGH (ref 70–99)
HCT VFR BLD CALC: 34 % — LOW (ref 39–50)
HGB BLD-MCNC: 11.7 G/DL — LOW (ref 13–17)
MCHC RBC-ENTMCNC: 31.6 PG — SIGNIFICANT CHANGE UP (ref 27–34)
MCHC RBC-ENTMCNC: 34.4 GM/DL — SIGNIFICANT CHANGE UP (ref 32–36)
MCV RBC AUTO: 91.9 FL — SIGNIFICANT CHANGE UP (ref 80–100)
METHOD TYPE: SIGNIFICANT CHANGE UP
ORGANISM # SPEC MICROSCOPIC CNT: SIGNIFICANT CHANGE UP
ORGANISM # SPEC MICROSCOPIC CNT: SIGNIFICANT CHANGE UP
PLATELET # BLD AUTO: 158 K/UL — SIGNIFICANT CHANGE UP (ref 150–400)
POTASSIUM SERPL-MCNC: 4.1 MMOL/L — SIGNIFICANT CHANGE UP (ref 3.5–5.3)
POTASSIUM SERPL-SCNC: 4.1 MMOL/L — SIGNIFICANT CHANGE UP (ref 3.5–5.3)
RBC # BLD: 3.7 M/UL — LOW (ref 4.2–5.8)
RBC # FLD: 13.3 % — SIGNIFICANT CHANGE UP (ref 10.3–14.5)
SODIUM SERPL-SCNC: 136 MMOL/L — SIGNIFICANT CHANGE UP (ref 135–145)
SPECIMEN SOURCE: SIGNIFICANT CHANGE UP
WBC # BLD: 3.73 K/UL — LOW (ref 3.8–10.5)
WBC # FLD AUTO: 3.73 K/UL — LOW (ref 3.8–10.5)

## 2022-06-10 PROCEDURE — 99239 HOSP IP/OBS DSCHRG MGMT >30: CPT

## 2022-06-10 RX ORDER — CHOLECALCIFEROL (VITAMIN D3) 125 MCG
1 CAPSULE ORAL
Qty: 0 | Refills: 0 | DISCHARGE

## 2022-06-10 RX ORDER — ACETAMINOPHEN 500 MG
2 TABLET ORAL
Qty: 0 | Refills: 0 | DISCHARGE
Start: 2022-06-10

## 2022-06-10 RX ORDER — LEVOTHYROXINE SODIUM 125 MCG
1 TABLET ORAL
Qty: 0 | Refills: 0 | DISCHARGE
Start: 2022-06-10

## 2022-06-10 RX ORDER — TAMSULOSIN HYDROCHLORIDE 0.4 MG/1
1 CAPSULE ORAL
Qty: 30 | Refills: 0
Start: 2022-06-10 | End: 2022-07-09

## 2022-06-10 RX ORDER — LEVOTHYROXINE SODIUM 125 MCG
1 TABLET ORAL
Qty: 0 | Refills: 0 | DISCHARGE

## 2022-06-10 RX ORDER — CHOLECALCIFEROL (VITAMIN D3) 125 MCG
2000 CAPSULE ORAL
Qty: 0 | Refills: 0 | DISCHARGE
Start: 2022-06-10

## 2022-06-10 RX ADMIN — Medication 250 MILLIGRAM(S): at 08:43

## 2022-06-10 RX ADMIN — Medication 125 MICROGRAM(S): at 06:04

## 2022-06-10 RX ADMIN — Medication 2000 UNIT(S): at 08:41

## 2022-06-10 RX ADMIN — Medication 2: at 11:40

## 2022-06-10 RX ADMIN — Medication 145 MILLIGRAM(S): at 08:42

## 2022-06-10 RX ADMIN — Medication 2: at 08:41

## 2022-06-10 RX ADMIN — Medication 81 MILLIGRAM(S): at 08:41

## 2022-06-10 NOTE — DISCHARGE NOTE PROVIDER - CARE PROVIDER_API CALL
Jhony Clements)  Gastroenterology; Internal Medicine  [address] [facility] [address]  Phone: [phone]  Fax: [phone]  Follow Up Time:     Alex Ochoa)  Urology  [address] [place] [address]  Phone: [phone]  Fax: [phone]  Follow Up Time:     your primary are physician,   in 1-2 weeks,  Phone: (   )    -  Fax: (   )    -  Follow Up Time:

## 2022-06-10 NOTE — DISCHARGE NOTE PROVIDER - HOSPITAL COURSE
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 Pt reports nausea, temperature of 100 F at home, burning on urination. He went to urgent care and UA showed significant blood and UTI. He was sent to the hospital for evaluation.  Pt was given Ceftriaxone, 1500 ml of NS, morphine, zofran, tylenol.   Patient admitted for UTI and right ureteral stone. Urology consulted. Plan for outpatient management of ureteral stone. Pt passed 2 kidney stones while inpatient. Blood cultures showed Staph epidermidis- He received Ceftriaxone and Vanco- repeat BC negative- plan for dc home on oral antibiotic Bactrim to complete 10 more days.     6/10- patient was seen and examined. No acute overnight events.    Vital Signs Last 24 Hrs  T(C): 36.4 (10 Negro 2022 08:12), Max: 36.8 (09 Jun 2022 21:25)  T(F): 97.5 (10 Negro 2022 08:12), Max: 98.3 (09 Jun 2022 21:25)  HR: 65 (10 Negro 2022 08:12) (62 - 70)  BP: 122/- (10 Negro 2022 08:12) (110/68 - 134/77)  BP(mean): --  RR: 18 (10 Negro 2022 08:12) (18 - 18)  SpO2: 98% (10 Negro 2022 08:12) (98% - 98%)    ROS:   All 10 systems reviewed and found to be negative with the exception of what has been described above.  PE:  Constitutional: NAD, laying in bed  HEENT: NC/AT  Back: no tenderness  Respiratory: respirations even and non labored, LCTA  Cardiovascular: S1S2 regular, no murmurs  Abdomen: soft, not tender, not distended, positive BS  Genitourinary: voiding  Musculoskeletal: no muscle tenderness, no joint swelling or tenderness  Extremities: no pedal edema   Neurological: no focal deficits    PLAN   1. 4 mm calculus in the right mid ureter with mild right hydronephrosis  - CT scan: 4 mm calculus in the right mid ureter with mild right hydronephrosis. Additional right intrarenal calculi measuring up to 7 mm. 8 mm nonobstructive left renal calculus. No left-sided hydroureteronephrosis.  - Start Flomax   - c/w Ceftriaxone D#3  - Strain urine   - Pain control: Tylenol, Morphine   - Zofran for nausea   - s/p 1L of NS in the ER, c/w 100 ml/hr  - Urology consult, d/w Dr Ochoa- no acuate urological intervention - f/u as an outpatient in his office       2. Urinary tract infection- sepsis urinary origin related coag negative staph  - UA: positive nitrite, small leukocyte esterase, WBC 11-25, RBC 6-10, occasional bacteria  - c/w Ceftriaxone D#3- changed to vanco  - f/u UCx, BCx x 2 - BC preliminary aerobic bottle and anaerobic  - GPC in clusters- staph epidermidis  - Urine culture showing Coag negative Staph  - ID consult  Leukocytosis- improved   - Plan for dc to rehab on oral antibiotic DS Bactrim for 10 more days.    * Elevated Cr - resolved TERRY     * Hyperglycemia in the setting of Type 2 DM   - Glucose 124, monitor closely   - Ordered HbA1c 6.6   - Moderate dose ISS, add on lantus and ademelog once tolerating diet   - resume home meds on DC    * 1.1 cm indeterminate right upper pole hyperdensity, may represent a complex cyst versus mass   - Ordered MRI abd/pelvis contrast for evaluation   MRI showed - 13 mm right renal hemorrhagic cyst which corresponds to the indeterminate   lesion described on CT scan.7 mm cystic pancreatic lesion for which follow-up MRI is recommended in 2   years as per American College of Radiology recommendations.  Fatty liver.   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 Pt reports nausea, temperature of 100 F at home, burning on urination. He went to urgent care and UA showed significant blood and UTI. He was sent to the hospital for evaluation.  Pt was given Ceftriaxone, 1500 ml of NS, morphine, zofran, tylenol.   Patient admitted for UTI and right ureteral stone. Urology consulted. Plan for outpatient management of ureteral stone. Pt passed 2 kidney stones while inpatient. Blood cultures showed Staph epidermidis- He received Ceftriaxone and Vanco- repeat BC negative- plan for dc home on oral antibiotic Bactrim to complete 10 more days.     6/10- patient was seen and examined. No acute overnight events.    Vital Signs Last 24 Hrs  T(C): 36.4 (10 Negro 2022 08:12), Max: 36.8 (09 Jun 2022 21:25)  T(F): 97.5 (10 Negro 2022 08:12), Max: 98.3 (09 Jun 2022 21:25)  HR: 65 (10 Negro 2022 08:12) (62 - 70)  BP: 122/- (10 Negro 2022 08:12) (110/68 - 134/77)  BP(mean): --  RR: 18 (10 Negro 2022 08:12) (18 - 18)  SpO2: 98% (10 Negro 2022 08:12) (98% - 98%)    ROS:   All 10 systems reviewed and found to be negative with the exception of what has been described above.  PE:  Constitutional: NAD, laying in bed  HEENT: NC/AT  Back: no tenderness  Respiratory: respirations even and non labored, LCTA  Cardiovascular: S1S2 regular, no murmurs  Abdomen: soft, not tender, not distended, positive BS  Genitourinary: voiding  Musculoskeletal: no muscle tenderness, no joint swelling or tenderness  Extremities: no pedal edema   Neurological: no focal deficits    PLAN   1. 4 mm calculus in the right mid ureter with mild right hydronephrosis  - CT scan: 4 mm calculus in the right mid ureter with mild right hydronephrosis. Additional right intrarenal calculi measuring up to 7 mm. 8 mm nonobstructive left renal calculus. No left-sided hydroureteronephrosis.  - Start Flomax   - c/w Ceftriaxone D#3  - Strain urine   - Pain control: Tylenol, Morphine   - Zofran for nausea   - s/p 1L of NS in the ER, c/w 100 ml/hr  - Urology consult, d/w Dr Ochoa- no acuate urological intervention - f/u as an outpatient in his office       2. Urinary tract infection- sepsis urinary origin  POA related coag negative staph  - UA: positive nitrite, small leukocyte esterase, WBC 11-25, RBC 6-10, occasional bacteria  - c/w Ceftriaxone D#3- changed to vanco  - f/u UCx, BCx x 2 - BC preliminary aerobic bottle and anaerobic  - GPC in clusters- staph epidermidis  - Urine culture showing Coag negative Staph  - ID consult  Leukocytosis- improved   - Plan for dc to rehab on oral antibiotic DS Bactrim for 10 more days.    * Elevated Cr - resolved TERRY     * Hyperglycemia in the setting of Type 2 DM   - Glucose 124, monitor closely   - Ordered HbA1c 6.6   - Moderate dose ISS, add on lantus and ademelog once tolerating diet   - resume home meds on DC    * 1.1 cm indeterminate right upper pole hyperdensity, may represent a complex cyst versus mass   - Ordered MRI abd/pelvis contrast for evaluation   MRI showed - 13 mm right renal hemorrhagic cyst which corresponds to the indeterminate   lesion described on CT scan.7 mm cystic pancreatic lesion for which follow-up MRI is recommended in 2   years as per American College of Radiology recommendations.  Fatty liver.

## 2022-06-10 NOTE — DISCHARGE NOTE PROVIDER - NSDCFUSCHEDAPPT_GEN_ALL_CORE_FT
[facility]  Endocrin [address]  Scheduled Appointment: 06/23/2022

## 2022-06-10 NOTE — PROGRESS NOTE ADULT - ASSESSMENT
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 Pt reports nausea, temperature of 100 F at home, burning on urination. He went to urgent care and UA showed significant blood and UTI. He was sent to the hospital for evaluation. Denies fever, chills at home. In ER he received ceftriaxone.     1. Pyuria. UTI with CNST. Sepsis with CNST. Kidney stones. CRF stage 3.   -urine culture and BC (in all 4 bottles) growing CNST - likely true pathogen  -s/p ceftriaxone 1 gm IV qd # 2  -on vancomycin 1 gm IV q12h # 2  -tolerating abx well so far; no side effects noted  -repeat BC x 2 are negative to date  -urology evaluation appreciated  -may change abx to bactrim DS 1 tab PO q12h for 10 more days  -f/u cultures  -monitor temps  -f/u CBC  -supportive care  2. Other issues:   -care per medicine

## 2022-06-10 NOTE — DISCHARGE NOTE NURSING/CASE MANAGEMENT/SOCIAL WORK - PATIENT PORTAL LINK FT
You can access the FollowMyHealth Patient Portal offered by [facility] by registering at the following website: [email or web address]. By joining [employer]’s FollowMyHealth portal, you will also be able to view your health information using other applications (apps) compatible with our system.

## 2022-06-10 NOTE — DISCHARGE NOTE PROVIDER - NSDCCAREPROVSEEN_GEN_ALL_CORE_FT
Jovanni, Hiral Taylor, Honorio Tamez, Joycelyn Valladares, Melody Ann, Tobin Ruelas, Mikal Ochoa, Alex Hui, Fernanda

## 2022-06-10 NOTE — CDI QUERY NOTE - NSCDIOTHERTXTBX_GEN_ALL_CORE_HH
The Physician's or Provider's documentation of sepsis is clinically supported by the patient's presentation, evaluation and medical management, as indicated below.      There is a need to further clarify the status of sepsis.    Please clarify if the Sepsis was present on admission?  A) Sepsis POA  B) Sepsis not POA  C) Unable to determine  D) Other ( Please specify)    SUPPORTING DOCUMENTATION AND/OR CLINICAL EVIDENCE:    WBC Count: 3.73 K/uL (06.10.22 @ 09:12)   WBC Count: 4.99 K/uL (06.08.22 @ 07:27)   WBC Count: 7.88 K/uL (06.07.22 @ 06:50)   WBC Count: 13.42 K/uL (06.06.22 @ 22:38    Lactate, Blood: 1.6 mmol/L (06.07.22 @ 11:55)   Lactate, Blood: 2.5: Elevated lactate. Consider ordering follow-up lactate to trend. mmol/L (06.07.22 @ 03:14)   Lactate, Blood: 2.5: Elevated lactate. Consider ordering follow-up lactate to trend. mmol/L (06.06.22 @ 23:45)    Culture - Blood (collected 06-06-22 @ 22:38)  Source: .Blood None  Gram Stain (06-08-22 @ 17:39):    Growth in anaerobic bottle: Gram Positive Cocci in Clusters  Preliminary Report (06-09-22 @ 18:12):    Growth in anaerobic bottle: Staphylococcus epidermidis    Culture - Blood (collected 06-08-22 @ 15:28)  Source: .Blood None  Preliminary Report (06-09-22 @ 22:02):    No growth to date.    Culture - Blood (collected 06-08-22 @ 15:24)  Source: .Blood None  Preliminary Report (06-09-22 @ 22:02):    No growth to date.      Antibiotics:   cefTRIAXone   IVPB  vancomycin  IVPB    H&P documentation on 6/7/2022:  Pt was given Ceftriaxone, 1500 ml of NS, morphine, zofran, tylenol  4 mm calculus in the right mid ureter with mild right hydronephrosis  Urinary tract infection       Discharge summary documentation:  2. Urinary tract infection- sepsis urinary origin related coag negative staph  - UA: positive nitrite, small leukocyte esterase, WBC 11-25, RBC 6-10, occasional bacteria  - c/w Ceftriaxone D#3- changed to vanco  - f/u UCx, BCx x 2 - BC preliminary aerobic bottle and anaerobic  - GPC in clusters- staph epidermidis  - Urine culture showing Coag negative Staph  - ID consult  Leukocytosis- improved

## 2022-06-10 NOTE — DISCHARGE NOTE PROVIDER - NSDCCPCAREPLAN_GEN_ALL_CORE_FT
PRINCIPAL DISCHARGE DIAGNOSIS  Diagnosis: Renal calculi  Assessment and Plan of Treatment: outpatient f/u with Dr Ochoa  continue to strain urine  c/w Flomax  *UTI  - complete antibiotic regimen as prescribed. Complete the full course of antibiotics- do not skip or miss doses- do not stop even when you start to feel better.   Notify your PCP if your symptom has worsened or if you develop excessive diarrhea while on antibiotic.   you will need to be on bactrim for 10 more days.  *MRI abdomen showed 7mm pancreatic lesion - f/u with Gastroenterologist as an outpatient.      SECONDARY DISCHARGE DIAGNOSES  Diagnosis: Acute UTI  Assessment and Plan of Treatment:

## 2022-06-10 NOTE — DISCHARGE NOTE PROVIDER - CARE PROVIDERS DIRECT ADDRESSES
,[email or web address],DirectAddress_Unknown,DirectAddress_Unknown

## 2022-06-10 NOTE — DISCHARGE NOTE PROVIDER - NSDCMRMEDTOKEN_GEN_ALL_CORE_FT
acetaminophen 325 mg oral tablet: 2 tab(s) orally every 6 hours, As needed, Temp greater or equal to 38C (100.4F), Mild Pain (1 - 3)  Aspirin Enteric Coated 81 mg oral delayed release tablet: 1 tab(s) orally once a day  atorvastatin 10 mg oral tablet: 1 tab(s) orally once a day  Bactrim  mg-160 mg oral tablet: 1 tab(s) orally every 12 hours   cholecalciferol oral tablet: 2000 unit(s) orally once a day  fenofibrate 145 mg oral tablet: 1 tab(s) orally once a day  levothyroxine 125 mcg (0.125 mg) oral tablet: 1 tab(s) orally once a day  metFORMIN 500 mg oral tablet, extended release: 1 tab(s) orally 2 times a day  semaglutide 0.5 mg/0.5 mL (0.5 mg dose) subcutaneous solution: 0.5 milligram(s) subcutaneous once a week  tamsulosin 0.4 mg oral capsule: 1 cap(s) orally once a day (at bedtime)

## 2022-06-10 NOTE — DISCHARGE NOTE NURSING/CASE MANAGEMENT/SOCIAL WORK - NSDCPEFALRISK_GEN_ALL_CORE
For information on Fall & Injury Prevention, visit: [email or web address] OR  [email or web address] OR  https://www.cdc.gov/steadi/patient.html

## 2022-06-10 NOTE — PROGRESS NOTE ADULT - SUBJECTIVE AND OBJECTIVE BOX
Date of service: 06-10-22 @ 13:30    Sitting in bed in NAD  No chills  Has urinary frewuency    ROS: no fever or chills; denies dizziness, no HA, no SOB or cough, no abdominal pain, no diarrhea or constipation; no legs pain, no rashes    MEDICATIONS  (STANDING):  aspirin enteric coated 81 milliGRAM(s) Oral daily  atorvastatin 10 milliGRAM(s) Oral at bedtime  cholecalciferol 2000 Unit(s) Oral daily  dextrose 5%. 1000 milliLiter(s) (50 mL/Hr) IV Continuous <Continuous>  dextrose 5%. 1000 milliLiter(s) (100 mL/Hr) IV Continuous <Continuous>  dextrose 50% Injectable 25 Gram(s) IV Push once  dextrose 50% Injectable 12.5 Gram(s) IV Push once  dextrose 50% Injectable 25 Gram(s) IV Push once  enoxaparin Injectable 40 milliGRAM(s) SubCutaneous every 24 hours  fenofibrate Tablet 145 milliGRAM(s) Oral daily  glucagon  Injectable 1 milliGRAM(s) IntraMuscular once  insulin lispro (ADMELOG) corrective regimen sliding scale   SubCutaneous at bedtime  insulin lispro (ADMELOG) corrective regimen sliding scale   SubCutaneous three times a day before meals  levothyroxine 125 MICROGram(s) Oral daily  senna 2 Tablet(s) Oral at bedtime  sodium chloride 0.9%. 1000 milliLiter(s) (100 mL/Hr) IV Continuous <Continuous>  tamsulosin 0.4 milliGRAM(s) Oral at bedtime  vancomycin  IVPB 1000 milliGRAM(s) IV Intermittent every 12 hours    Vital Signs Last 24 Hrs  T(C): 36.4 (10 Negro 2022 08:12), Max: 36.8 (2022 21:25)  T(F): 97.5 (10 Negro 2022 08:12), Max: 98.3 (2022 21:25)  HR: 65 (10 Negro 2022 08:12) (62 - 70)  BP: 122/- (10 Negro 2022 08:12) (110/68 - 134/77)  BP(mean): --  RR: 18 (10 Negro 2022 08:12) (18 - 18)  SpO2: 98% (10 Negro 2022 08:12) (98% - 98%)     Physical exam:    Constitutional:  No acute distress  HEENT: NC/AT, EOMI, PERRLA, conjunctivae clear; ears and nose atraumatic  Neck: supple; thyroid not palpable  Back: no tenderness  Respiratory: respiratory effort normal; clear to auscultation  Cardiovascular: S1S2 regular, no murmurs  Abdomen: soft, not tender, not distended, positive BS  Genitourinary: no suprapubic tenderness  has left flank pain  Lymphatic: no LN palpable  Musculoskeletal: no muscle tenderness, no joint swelling or tenderness  Extremities: no pedal edema  Neurological/ Psychiatric: AxOx3, moving all extremities  Skin: no rashes; no palpable lesions    Labs: reviewed                        11.7   3.73  )-----------( 158      ( 10 Negro 2022 09:12 )             34.0     06-10    136  |  104  |  15  ----------------------------<  201<H>  4.1   |  23  |  1.33<H>    Ca    9.9      10 Negro 2022 09:12                        11.1   4.99  )-----------( 127      ( 2022 07:27 )             33.2     06-08    136  |  106  |  9   ----------------------------<  142<H>  4.0   |  24  |  1.29    Ca    9.3      2022 07:27  Phos  3.2     06-07    TPro  6.7  /  Alb  3.5  /  TBili  0.8  /  DBili  x   /  AST  19  /  ALT  21  /  AlkPhos  35<L>  06-07     LIVER FUNCTIONS - ( 2022 06:50 )  Alb: 3.5 g/dL / Pro: 6.7 gm/dL / ALK PHOS: 35 U/L / ALT: 21 U/L / AST: 19 U/L / GGT: x           Urinalysis Basic - ( 2022 22:31 )    Color: Yellow / Appearance: Clear / S.025 / pH: x  Gluc: x / Ketone: Trace  / Bili: Negative / Urobili: Negative   Blood: x / Protein: 30 mg/dL / Nitrite: Positive   Leuk Esterase: Small / RBC: 6-10 /HPF / WBC 11-25   Sq Epi: x / Non Sq Epi: x / Bacteria: Occasional      Culture - Blood (collected 2022 15:28)  Source: .Blood None  Preliminary Report (2022 22:02):    No growth to date.    Culture - Blood (collected 2022 15:24)  Source: .Blood None  Preliminary Report (2022 22:02):    No growth to date.    Culture - Blood (collected 2022 22:38)  Source: .Blood None  Gram Stain (2022 15:24):    Growth in aerobic bottle: Gram Positive Cocci in Clusters    Growth in anaerobic bottle: Gram Positive Cocci in Clusters  Final Report (2022 16:39):    Growth in aerobic and anaerobic bottles: Staphylococcus epidermidis    Coag Negative Staphylococcus  Organism: Blood Culture PCR (2022 16:39)      -  Staphylococcus epidermidis: [place]ec      Method Type: PCR    Culture - Blood (collected 2022 22:38)  Source: .Blood None  Gram Stain (2022 17:39):    Growth in anaerobic bottle: Gram Positive Cocci in Clusters  Preliminary Report (2022 18:12):    Growth in anaerobic bottle: Staphylococcus epidermidis    Culture - Urine (collected 2022 22:31)  Source: Clean Catch Clean Catch (Midstream)  Final Report (2022 14:04):    50,000 - 99,000 CFU/mL Coag Negative Staphylococcus "Susceptibilities not    performed"        Radiology: all available radiological tests reviewed    < from: CT Abdomen and Pelvis No Cont (22 @ 22:54) >  4 mm calculus in the right mid ureter with mild right hydronephrosis. Additional right intrarenal calculi measuring up to 7 mm.  8 mm nonobstructive left renal calculus. No left-sided hydroureteronephrosis.  1.1 cm indeterminate right upper pole hyperdensity, may represent a   complex cyst versus mass. Further evaluation with nonemergent contrast-enhanced MRI is recommended.  < end of copied text >      Advanced directives addressed: full resuscitation

## 2022-06-13 LAB
CULTURE RESULTS: SIGNIFICANT CHANGE UP
CULTURE RESULTS: SIGNIFICANT CHANGE UP
SPECIMEN SOURCE: SIGNIFICANT CHANGE UP
SPECIMEN SOURCE: SIGNIFICANT CHANGE UP

## 2022-06-16 ENCOUNTER — LABORATORY RESULT (OUTPATIENT)
Age: 67
End: 2022-06-16

## 2022-06-16 DIAGNOSIS — E11.65 TYPE 2 DIABETES MELLITUS WITH HYPERGLYCEMIA: ICD-10-CM

## 2022-06-16 DIAGNOSIS — R78.81 BACTEREMIA: ICD-10-CM

## 2022-06-16 DIAGNOSIS — N13.6 PYONEPHROSIS: ICD-10-CM

## 2022-06-16 DIAGNOSIS — N18.30 CHRONIC KIDNEY DISEASE, STAGE 3 UNSPECIFIED: ICD-10-CM

## 2022-06-16 DIAGNOSIS — Z79.890 HORMONE REPLACEMENT THERAPY: ICD-10-CM

## 2022-06-16 DIAGNOSIS — Z79.82 LONG TERM (CURRENT) USE OF ASPIRIN: ICD-10-CM

## 2022-06-16 DIAGNOSIS — I12.9 HYPERTENSIVE CHRONIC KIDNEY DISEASE WITH STAGE 1 THROUGH STAGE 4 CHRONIC KIDNEY DISEASE, OR UNSPECIFIED CHRONIC KIDNEY DISEASE: ICD-10-CM

## 2022-06-16 DIAGNOSIS — Z88.1 ALLERGY STATUS TO OTHER ANTIBIOTIC AGENTS STATUS: ICD-10-CM

## 2022-06-16 DIAGNOSIS — D64.9 ANEMIA, UNSPECIFIED: ICD-10-CM

## 2022-06-16 DIAGNOSIS — D69.6 THROMBOCYTOPENIA, UNSPECIFIED: ICD-10-CM

## 2022-06-16 DIAGNOSIS — A41.9 SEPSIS, UNSPECIFIED ORGANISM: ICD-10-CM

## 2022-06-16 DIAGNOSIS — Z79.4 LONG TERM (CURRENT) USE OF INSULIN: ICD-10-CM

## 2022-06-16 DIAGNOSIS — E78.5 HYPERLIPIDEMIA, UNSPECIFIED: ICD-10-CM

## 2022-06-16 DIAGNOSIS — N28.1 CYST OF KIDNEY, ACQUIRED: ICD-10-CM

## 2022-06-16 DIAGNOSIS — K76.0 FATTY (CHANGE OF) LIVER, NOT ELSEWHERE CLASSIFIED: ICD-10-CM

## 2022-06-16 DIAGNOSIS — E03.9 HYPOTHYROIDISM, UNSPECIFIED: ICD-10-CM

## 2022-06-16 DIAGNOSIS — E83.52 HYPERCALCEMIA: ICD-10-CM

## 2022-06-16 DIAGNOSIS — N17.9 ACUTE KIDNEY FAILURE, UNSPECIFIED: ICD-10-CM

## 2022-06-16 DIAGNOSIS — N39.0 URINARY TRACT INFECTION, SITE NOT SPECIFIED: ICD-10-CM

## 2022-06-17 LAB
ALBUMIN SERPL ELPH-MCNC: 5.3 G/DL
ALP BLD-CCNC: 41 U/L
ALT SERPL-CCNC: 26 U/L
ANION GAP SERPL CALC-SCNC: 14 MMOL/L
AST SERPL-CCNC: 26 U/L
BASOPHILS # BLD AUTO: 0.12 K/UL
BASOPHILS NFR BLD AUTO: 4.5 %
BILIRUB SERPL-MCNC: 0.6 MG/DL
BUN SERPL-MCNC: 17 MG/DL
CALCIUM SERPL-MCNC: 10.3 MG/DL
CHLORIDE SERPL-SCNC: 100 MMOL/L
CHOLEST SERPL-MCNC: 120 MG/DL
CO2 SERPL-SCNC: 20 MMOL/L
CREAT SERPL-MCNC: 1.41 MG/DL
CREAT SPEC-SCNC: 94 MG/DL
EGFR: 54 ML/MIN/1.73M2
EOSINOPHIL # BLD AUTO: 0.02 K/UL
EOSINOPHIL NFR BLD AUTO: 0.7 %
ESTIMATED AVERAGE GLUCOSE: 137 MG/DL
GLUCOSE SERPL-MCNC: 120 MG/DL
HBA1C MFR BLD HPLC: 6.4 %
HCT VFR BLD CALC: 37.4 %
HDLC SERPL-MCNC: 26 MG/DL
HGB BLD-MCNC: 12 G/DL
IMM GRANULOCYTES NFR BLD AUTO: 0.4 %
LDLC SERPL CALC-MCNC: 58 MG/DL
LDLC SERPL DIRECT ASSAY-MCNC: 63 MG/DL
LYMPHOCYTES # BLD AUTO: 1.33 K/UL
LYMPHOCYTES NFR BLD AUTO: 49.4 %
MAN DIFF?: NORMAL
MCHC RBC-ENTMCNC: 30.7 PG
MCHC RBC-ENTMCNC: 32.1 GM/DL
MCV RBC AUTO: 95.7 FL
MICROALBUMIN 24H UR DL<=1MG/L-MCNC: 1.6 MG/DL
MICROALBUMIN/CREAT 24H UR-RTO: 16 MG/G
MONOCYTES # BLD AUTO: 0.34 K/UL
MONOCYTES NFR BLD AUTO: 12.6 %
NEUTROPHILS # BLD AUTO: 0.87 K/UL
NEUTROPHILS NFR BLD AUTO: 32.4 %
NONHDLC SERPL-MCNC: 94 MG/DL
PLATELET # BLD AUTO: 224 K/UL
POTASSIUM SERPL-SCNC: 4.2 MMOL/L
PROT SERPL-MCNC: 8.3 G/DL
RBC # BLD: 3.91 M/UL
RBC # FLD: 14.1 %
SODIUM SERPL-SCNC: 133 MMOL/L
T3 SERPL-MCNC: 88 NG/DL
T4 FREE SERPL-MCNC: 1.9 NG/DL
TRIGL SERPL-MCNC: 178 MG/DL
TSH SERPL-ACNC: 1.75 UIU/ML
WBC # FLD AUTO: 2.69 K/UL

## 2022-06-21 PROBLEM — E11.9 TYPE 2 DIABETES MELLITUS WITHOUT COMPLICATIONS: Chronic | Status: ACTIVE | Noted: 2022-06-07

## 2022-06-21 PROBLEM — Z86.19 PERSONAL HISTORY OF OTHER INFECTIOUS AND PARASITIC DISEASES: Chronic | Status: ACTIVE | Noted: 2022-06-07

## 2022-06-21 PROBLEM — E78.5 HYPERLIPIDEMIA, UNSPECIFIED: Chronic | Status: ACTIVE | Noted: 2022-06-07

## 2022-06-21 PROBLEM — N20.0 CALCULUS OF KIDNEY: Chronic | Status: ACTIVE | Noted: 2022-06-07

## 2022-06-21 PROBLEM — E03.9 HYPOTHYROIDISM, UNSPECIFIED: Chronic | Status: ACTIVE | Noted: 2022-06-07

## 2022-06-23 ENCOUNTER — APPOINTMENT (OUTPATIENT)
Dept: ENDOCRINOLOGY | Facility: CLINIC | Age: 67
End: 2022-06-23
Payer: COMMERCIAL

## 2022-06-23 VITALS — DIASTOLIC BLOOD PRESSURE: 84 MMHG | OXYGEN SATURATION: 97 % | SYSTOLIC BLOOD PRESSURE: 126 MMHG | HEART RATE: 66 BPM

## 2022-06-23 VITALS — WEIGHT: 214 LBS | BODY MASS INDEX: 28.99 KG/M2 | HEIGHT: 72 IN

## 2022-06-23 PROCEDURE — 99214 OFFICE O/P EST MOD 30 MIN: CPT

## 2022-06-23 NOTE — ASSESSMENT
[Exercise/Effect on Glucose] : exercise/effect on glucose [Retinopathy Screening] : Patient was referred to ophthalmology for retinopathy screening [FreeTextEntry1] : Dm 2 well controlled in patient with HTN, HLD, obesity.  \par \par Dm2:\par a1c 6.4.  recent urosepsis and lost 24 lbs\par cont ozempic  \par continue metformin 1000 mg BID \par bgs 2x day \par encouraged more exercise walking 30 min 3 x week\par UTD with eye exam \par CKD stage III : monitor. GFR  stable. \par eye exam Utd \par \par BP:  bp at target on meds. Continue current management.\par I advised low fat/low cholesterol diet, low salt diet, and weight loss\par \par HLD: lipids very good. Continue atorvastatin 10 mg qd and continue fenofibrate \par \par obesity: encouraged more exercise. \par \par hypoT : pt euthyroid clinically and biochemically.\par continue Lt4 125 mcg qd   \par \par Anemia: seen by hematology and felt it was secondary to CKD.

## 2022-06-23 NOTE — PHYSICAL EXAM
[Alert] : alert [Well Nourished] : well nourished [Obese] : obese [No Acute Distress] : no acute distress [Well Developed] : well developed [Normal Sclera/Conjunctiva] : normal sclera/conjunctiva [EOMI] : extra ocular movement intact [No Proptosis] : no proptosis [Normal Oropharynx] : the oropharynx was normal [No Respiratory Distress] : no respiratory distress [No Accessory Muscle Use] : no accessory muscle use [Clear to Auscultation] : lungs were clear to auscultation bilaterally [Normal S1, S2] : normal S1 and S2 [Normal Rate] : heart rate was normal [Regular Rhythm] : with a regular rhythm [No Edema] : no peripheral edema [Pedal Pulses Normal] : the pedal pulses are present [Normal Bowel Sounds] : normal bowel sounds [Not Tender] : non-tender [Not Distended] : not distended [Soft] : abdomen soft [Normal Anterior Cervical Nodes] : no anterior cervical lymphadenopathy [No Tremors] : no tremors [Oriented x3] : oriented to person, place, and time [Acanthosis Nigricans] : no acanthosis nigricans [de-identified] : L thyroid nodule palpated

## 2022-06-23 NOTE — ASSESSMENT
[Carbohydrate Consistent Diet] : carbohydrate consistent diet [Exercise/Effect on Glucose] : exercise/effect on glucose [Self Monitoring of Blood Glucose] : self monitoring of blood glucose [Weight Loss] : weight loss [FreeTextEntry1] : Dm 2 well controlled with A1c  6.4.  Pt has also HTN, HLD, obesity.  a1c 6.4\par \par Dm2:\par bgs very good.  weight sable. \par i would stop glimepiride and start ozempic \par continue metformin 1000 mg BID \par bgs 2x day \par discussed diet and exercise\par encouraged more exercise walking 30 min 3 x week\par UTD with eye exam \par CKD stage III : monitor. GFR  stable. \par feet exam: decreased monofilament and decreased vibratory on soles B/L \par \par BP:  bp at target on meds. Continue current management.\par I advised low fat/low cholesterol diet, low salt diet, and weight loss\par \par HLD: LDL at target.\par low fat/low cholesterol diet and weight loss advised\par continue atorvastatin 10 mg qd and continue fenofibrate \par \par obesity: encouraged more exercise. \par \par hypoT : pt euthyroid clinically and biochemically.\par continue Lt4 125 mcg qd   \par colloid cyst thyroid \par \par Anemia: seen by hematology and felt it was secondary to CKD.

## 2022-06-23 NOTE — PHYSICAL EXAM
[Alert] : alert [Well Nourished] : well nourished [Obese] : obese [No Acute Distress] : no acute distress [Well Developed] : well developed [Normal Sclera/Conjunctiva] : normal sclera/conjunctiva [EOMI] : extra ocular movement intact [No Proptosis] : no proptosis [Normal Oropharynx] : the oropharynx was normal [No Respiratory Distress] : no respiratory distress [No Accessory Muscle Use] : no accessory muscle use [Clear to Auscultation] : lungs were clear to auscultation bilaterally [Normal S1, S2] : normal S1 and S2 [Normal Rate] : heart rate was normal [Regular Rhythm] : with a regular rhythm [No Edema] : no peripheral edema [Pedal Pulses Normal] : the pedal pulses are present [Normal Bowel Sounds] : normal bowel sounds [Not Tender] : non-tender [Not Distended] : not distended [Soft] : abdomen soft [Normal Anterior Cervical Nodes] : no anterior cervical lymphadenopathy [No Tremors] : no tremors [Oriented x3] : oriented to person, place, and time [Acanthosis Nigricans] : no acanthosis nigricans [Right foot was examined, including] : right foot ~C was examined, including visual inspection with sensory and pulse exams [Left foot was examined, including] : left foot ~C was examined, including visual inspection with sensory and pulse exams [Delayed in the Right Toes] : normal in the toes [Normal] : normal [Full ROM] : with full range of motion [Delayed in the Left Toes] : normal in the toes [Vibration Dec.] : diminished vibratory sensation at the level of the toes [Position Sense Dec.] : diminished position sense at the level of the toes [Diminished Throughout Both Feet] : diminished tactile sensation with monofilament testing throughout both feet [#6 Diminished] : number 6 was normal [#7 Diminished] : number 7 was normal [#8 Diminished] : number 8 was normal [#9 Diminished] : number 9 was normal [#10 Diminished] : number 10 was normal [#1 Diminished] : number 1 was normal [#2 Diminished] : number 2 was normal [#3 Diminished] : number 3 was normal [#4 Diminished] : number 4 was normal [#5 Diminished] : number 5 was normal [de-identified] : L thyroid nodule palpated

## 2022-07-11 ENCOUNTER — APPOINTMENT (OUTPATIENT)
Dept: OTOLARYNGOLOGY | Facility: CLINIC | Age: 67
End: 2022-07-11

## 2022-07-11 VITALS — HEIGHT: 72 IN | WEIGHT: 220 LBS | BODY MASS INDEX: 29.8 KG/M2 | TEMPERATURE: 94.9 F

## 2022-07-11 DIAGNOSIS — H69.83 OTHER SPECIFIED DISORDERS OF EUSTACHIAN TUBE, BILATERAL: ICD-10-CM

## 2022-07-11 DIAGNOSIS — H90.3 SENSORINEURAL HEARING LOSS, BILATERAL: ICD-10-CM

## 2022-07-11 DIAGNOSIS — H61.23 IMPACTED CERUMEN, BILATERAL: ICD-10-CM

## 2022-07-11 PROCEDURE — G0268 REMOVAL OF IMPACTED WAX MD: CPT

## 2022-07-11 PROCEDURE — 92557 COMPREHENSIVE HEARING TEST: CPT

## 2022-07-11 PROCEDURE — 99202 OFFICE O/P NEW SF 15 MIN: CPT | Mod: 25

## 2022-07-11 PROCEDURE — 92567 TYMPANOMETRY: CPT

## 2022-07-11 NOTE — HISTORY OF PRESENT ILLNESS
[de-identified] : recent exam w primary\par dx cerumen impaction\par some ear discomfort\par co gradual hearing loss, occasional tinnitus

## 2022-07-11 NOTE — ASSESSMENT
[FreeTextEntry1] : cerumen cleared au\par audio au loss 4000 hz au excellent disc\par a/a tymps\par sn loss rec fu audio 1 y

## 2022-07-11 NOTE — CONSULT LETTER
[Consult Letter:] : I had the pleasure of evaluating your patient, [unfilled]. [Please see my note below.] : Please see my note below. [Consult Closing:] : Thank you very much for allowing me to participate in the care of this patient.  If you have any questions, please do not hesitate to contact me. [Sincerely,] : Sincerely, [FreeTextEntry1] : Dear Dr. PRACHI FERRER,\par \par Thank you for your kind referral. Please refer to my enclosed office notes for BOBBY MORRIS . If there are any questions free to contact me.\par  [FreeTextEntry3] : Kevin Oliver MD, FACS\par

## 2022-07-12 NOTE — DISCHARGE NOTE NURSING/CASE MANAGEMENT/SOCIAL WORK - BRAND OF FIRST COVID-19 BOOSTER
Airway patent, TM normal bilaterally, normal appearing mouth, nose, throat, neck supple with full range of motion, no cervical adenopathy.
[employer]

## 2022-07-21 ENCOUNTER — APPOINTMENT (OUTPATIENT)
Dept: GASTROENTEROLOGY | Facility: CLINIC | Age: 67
End: 2022-07-21

## 2022-07-21 VITALS
HEART RATE: 80 BPM | DIASTOLIC BLOOD PRESSURE: 83 MMHG | WEIGHT: 220 LBS | HEIGHT: 72 IN | SYSTOLIC BLOOD PRESSURE: 124 MMHG | BODY MASS INDEX: 29.8 KG/M2

## 2022-07-21 DIAGNOSIS — K86.2 CYST OF PANCREAS: ICD-10-CM

## 2022-07-21 PROCEDURE — 99202 OFFICE O/P NEW SF 15 MIN: CPT

## 2022-07-21 NOTE — HISTORY OF PRESENT ILLNESS
[de-identified] : 65yo male with pancreatic cyst\par \par Pt with incidental finding of small pancreatic cyst\par he is asymptomatic\par I reviewed MRI with 7mm pancreatic cyst\par Hx colon polyps and due for surveillance with other GI MD

## 2022-07-21 NOTE — ASSESSMENT
[FreeTextEntry1] : 67yo male with small pancreatic cyst\par \par Will check MRI in 1 year for follow up cyst\par we discussed potential EUS if changes in size

## 2022-07-25 ENCOUNTER — APPOINTMENT (OUTPATIENT)
Dept: DERMATOLOGY | Facility: CLINIC | Age: 67
End: 2022-07-25

## 2022-07-25 PROCEDURE — 99213 OFFICE O/P EST LOW 20 MIN: CPT

## 2022-07-25 RX ORDER — GLIMEPIRIDE 2 MG/1
2 TABLET ORAL
Qty: 90 | Refills: 1 | Status: DISCONTINUED | COMMUNITY
Start: 2018-03-06 | End: 2022-07-25

## 2022-07-25 NOTE — PHYSICAL EXAM
[Full Body Skin Exam Performed] : performed [FreeTextEntry3] : Skin examination performed of the face, neck, trunk, arms, legs; \par The patient is well, alert and oriented, pleasant and cooperative.\par Eyelids, conjunctivae, oral mucosa, digits and nails all normal.  \par No cervical adenopathy.\par \par Normal findings include:\par \par Seborrheic keratoses- largest on R lateral thigh\par Angiomas- some larger lesions on trunk\par Lentigines\par Multiple benign nevi were noted. Some nevi exhibited mildly atypical features, but none were suspicious for malignancy. \par \par Large nevus spilus L lower abdomen- \par Large, dark, regular compound nevus L lower back- 1.4cm; \par \par No lesions suspicious for malignancy.

## 2022-07-25 NOTE — HISTORY OF PRESENT ILLNESS
[de-identified] : Pt. presents for skin check;\par c/o few spots of concern;  \par Severity:  mild  \par Modifying factors:  none\par Associated symptoms:  none\par Context:  no association with activity\par Hx multiple nevi; \par

## 2022-07-25 NOTE — ASSESSMENT
[FreeTextEntry1] : Complete skin examination is negative for malignancy; Multiple new concerns were addressed and discussed.\par Therapeutic options and their risks and benefits; along with multiple diagnostic possibilities were discussed at length;\par risks and benefits of skin biopsy and/or other further study were discussed;\par \par Continue regular exams; Multiple [place] nevi, also angiomas/SKs;\par larger nevus L lower back likely 2 adjacent nevi\par \par Follow up for TBSE in 1 year \par \par

## 2022-08-29 ENCOUNTER — RX RENEWAL (OUTPATIENT)
Age: 67
End: 2022-08-29

## 2022-10-06 ENCOUNTER — RX RENEWAL (OUTPATIENT)
Age: 67
End: 2022-10-06

## 2022-11-15 LAB
ALBUMIN SERPL ELPH-MCNC: 5.1 G/DL
ALP BLD-CCNC: 41 U/L
ALT SERPL-CCNC: 9 U/L
ANION GAP SERPL CALC-SCNC: 15 MMOL/L
AST SERPL-CCNC: 15 U/L
BASOPHILS # BLD AUTO: 0.08 K/UL
BASOPHILS NFR BLD AUTO: 2 %
BILIRUB SERPL-MCNC: 0.6 MG/DL
BUN SERPL-MCNC: 17 MG/DL
CALCIUM SERPL-MCNC: 10.4 MG/DL
CHLORIDE SERPL-SCNC: 104 MMOL/L
CHOLEST SERPL-MCNC: 111 MG/DL
CO2 SERPL-SCNC: 21 MMOL/L
CREAT SERPL-MCNC: 1.06 MG/DL
CREAT SPEC-SCNC: 104 MG/DL
EGFR: 76 ML/MIN/1.73M2
EOSINOPHIL # BLD AUTO: 0.02 K/UL
EOSINOPHIL NFR BLD AUTO: 0.5 %
GLUCOSE SERPL-MCNC: 107 MG/DL
HCT VFR BLD CALC: 37.6 %
HDLC SERPL-MCNC: 28 MG/DL
HGB BLD-MCNC: 12.1 G/DL
IMM GRANULOCYTES NFR BLD AUTO: 0.3 %
LDLC SERPL CALC-MCNC: 54 MG/DL
LDLC SERPL DIRECT ASSAY-MCNC: 61 MG/DL
LYMPHOCYTES # BLD AUTO: 1.8 K/UL
LYMPHOCYTES NFR BLD AUTO: 46 %
MAN DIFF?: NORMAL
MCHC RBC-ENTMCNC: 30.6 PG
MCHC RBC-ENTMCNC: 32.2 GM/DL
MCV RBC AUTO: 95.2 FL
MICROALBUMIN 24H UR DL<=1MG/L-MCNC: 3.2 MG/DL
MICROALBUMIN/CREAT 24H UR-RTO: 31 MG/G
MONOCYTES # BLD AUTO: 0.65 K/UL
MONOCYTES NFR BLD AUTO: 16.6 %
NEUTROPHILS # BLD AUTO: 1.35 K/UL
NEUTROPHILS NFR BLD AUTO: 34.6 %
NONHDLC SERPL-MCNC: 82 MG/DL
PLATELET # BLD AUTO: 163 K/UL
POTASSIUM SERPL-SCNC: 4.2 MMOL/L
PROT SERPL-MCNC: 7.9 G/DL
RBC # BLD: 3.95 M/UL
RBC # FLD: 13.9 %
SODIUM SERPL-SCNC: 140 MMOL/L
T4 FREE SERPL-MCNC: 2 NG/DL
TRIGL SERPL-MCNC: 141 MG/DL
TSH SERPL-ACNC: 0.48 UIU/ML
WBC # FLD AUTO: 3.91 K/UL

## 2022-11-18 ENCOUNTER — APPOINTMENT (OUTPATIENT)
Dept: ENDOCRINOLOGY | Facility: CLINIC | Age: 67
End: 2022-11-18

## 2022-11-18 VITALS
DIASTOLIC BLOOD PRESSURE: 80 MMHG | WEIGHT: 192 LBS | BODY MASS INDEX: 26.01 KG/M2 | OXYGEN SATURATION: 98 % | HEART RATE: 72 BPM | HEIGHT: 72 IN | SYSTOLIC BLOOD PRESSURE: 124 MMHG

## 2022-11-18 LAB
ESTIMATED AVERAGE GLUCOSE: 128 MG/DL
HBA1C MFR BLD HPLC: 6.1 %

## 2022-11-18 PROCEDURE — 99214 OFFICE O/P EST MOD 30 MIN: CPT

## 2022-11-18 NOTE — ASSESSMENT
[FreeTextEntry1] : Dm 2 well controlled in patient with HTN, HLD, obesity.  \par \par Dm2: a1c 6.1. just recovered from covid , lost 28 lbs. \par cont ozempic  and  metformin 1000 mg BID \par bgs 2x day \par CKD stage III : monitor. GFR  stable. \par eye exam Utd \par \par BP:  bp at target on meds. Continue current management.\par I advised low fat/low cholesterol diet, low salt diet\par \par HLD: lipids excellent . Continue atorvastatin 10 mg qd and continue fenofibrate \par \par obesity: encouraged more exercise. \par \par hypoT : tfts normal cont  Lt4 125 mcg qd   \par \par Anemia: seen by hematology and felt it was secondary to CKD.  [Exercise/Effect on Glucose] : exercise/effect on glucose

## 2022-11-18 NOTE — PHYSICAL EXAM
[Alert] : alert [Well Nourished] : well nourished [No Acute Distress] : no acute distress [Well Developed] : well developed [Normal Sclera/Conjunctiva] : normal sclera/conjunctiva [EOMI] : extra ocular movement intact [No Proptosis] : no proptosis [Normal Oropharynx] : the oropharynx was normal [No Respiratory Distress] : no respiratory distress [No Accessory Muscle Use] : no accessory muscle use [Clear to Auscultation] : lungs were clear to auscultation bilaterally [Normal S1, S2] : normal S1 and S2 [Normal Rate] : heart rate was normal [Regular Rhythm] : with a regular rhythm [No Edema] : no peripheral edema [Pedal Pulses Normal] : the pedal pulses are present [Normal Bowel Sounds] : normal bowel sounds [Not Tender] : non-tender [Not Distended] : not distended [Soft] : abdomen soft [Normal Anterior Cervical Nodes] : no anterior cervical lymphadenopathy [No Tremors] : no tremors [Oriented x3] : oriented to person, place, and time [Acanthosis Nigricans] : no acanthosis nigricans [de-identified] : L thyroid nodule palpated

## 2022-12-27 ENCOUNTER — TRANSCRIPTION ENCOUNTER (OUTPATIENT)
Age: 67
End: 2022-12-27

## 2022-12-28 ENCOUNTER — OUTPATIENT (OUTPATIENT)
Dept: OUTPATIENT SERVICES | Facility: HOSPITAL | Age: 67
LOS: 1 days | End: 2022-12-28
Payer: COMMERCIAL

## 2022-12-28 DIAGNOSIS — Z86.010 PERSONAL HISTORY OF COLONIC POLYPS: ICD-10-CM

## 2022-12-28 DIAGNOSIS — Z98.890 OTHER SPECIFIED POSTPROCEDURAL STATES: Chronic | ICD-10-CM

## 2022-12-28 PROCEDURE — 82962 GLUCOSE BLOOD TEST: CPT

## 2022-12-28 PROCEDURE — 45380 COLONOSCOPY AND BIOPSY: CPT | Mod: PT

## 2022-12-28 DEVICE — CLIP RESOLUTION 360 235CM: Type: IMPLANTABLE DEVICE | Status: FUNCTIONAL

## 2022-12-29 LAB — SURGICAL PATHOLOGY STUDY: SIGNIFICANT CHANGE UP

## 2023-02-23 ENCOUNTER — NON-APPOINTMENT (OUTPATIENT)
Age: 68
End: 2023-02-23

## 2023-02-24 ENCOUNTER — EMERGENCY (EMERGENCY)
Facility: HOSPITAL | Age: 68
LOS: 0 days | Discharge: HOME | End: 2023-02-24
Attending: EMERGENCY MEDICINE
Payer: COMMERCIAL

## 2023-02-24 VITALS — WEIGHT: 205.91 LBS | OXYGEN SATURATION: 98 % | HEART RATE: 87 BPM

## 2023-02-24 VITALS
OXYGEN SATURATION: 98 % | DIASTOLIC BLOOD PRESSURE: 84 MMHG | RESPIRATION RATE: 18 BRPM | HEART RATE: 78 BPM | TEMPERATURE: 98 F | SYSTOLIC BLOOD PRESSURE: 144 MMHG

## 2023-02-24 DIAGNOSIS — R05.9 COUGH, UNSPECIFIED: ICD-10-CM

## 2023-02-24 DIAGNOSIS — Z79.82 LONG TERM (CURRENT) USE OF ASPIRIN: ICD-10-CM

## 2023-02-24 DIAGNOSIS — R50.9 FEVER, UNSPECIFIED: ICD-10-CM

## 2023-02-24 DIAGNOSIS — E03.9 HYPOTHYROIDISM, UNSPECIFIED: ICD-10-CM

## 2023-02-24 DIAGNOSIS — N39.0 URINARY TRACT INFECTION, SITE NOT SPECIFIED: ICD-10-CM

## 2023-02-24 DIAGNOSIS — Z88.0 ALLERGY STATUS TO PENICILLIN: ICD-10-CM

## 2023-02-24 DIAGNOSIS — Z20.822 CONTACT WITH AND (SUSPECTED) EXPOSURE TO COVID-19: ICD-10-CM

## 2023-02-24 DIAGNOSIS — Z87.442 PERSONAL HISTORY OF URINARY CALCULI: ICD-10-CM

## 2023-02-24 DIAGNOSIS — Z79.84 LONG TERM (CURRENT) USE OF ORAL HYPOGLYCEMIC DRUGS: ICD-10-CM

## 2023-02-24 DIAGNOSIS — Z98.890 OTHER SPECIFIED POSTPROCEDURAL STATES: Chronic | ICD-10-CM

## 2023-02-24 DIAGNOSIS — E78.5 HYPERLIPIDEMIA, UNSPECIFIED: ICD-10-CM

## 2023-02-24 DIAGNOSIS — E11.9 TYPE 2 DIABETES MELLITUS WITHOUT COMPLICATIONS: ICD-10-CM

## 2023-02-24 LAB
ALBUMIN SERPL ELPH-MCNC: 3.8 G/DL — SIGNIFICANT CHANGE UP (ref 3.3–5)
ALP SERPL-CCNC: 62 U/L — SIGNIFICANT CHANGE UP (ref 40–120)
ALT FLD-CCNC: 23 U/L — SIGNIFICANT CHANGE UP (ref 12–78)
ANION GAP SERPL CALC-SCNC: 7 MMOL/L — SIGNIFICANT CHANGE UP (ref 5–17)
APPEARANCE UR: CLEAR — SIGNIFICANT CHANGE UP
APTT BLD: 31.9 SEC — SIGNIFICANT CHANGE UP (ref 27.5–35.5)
AST SERPL-CCNC: 12 U/L — LOW (ref 15–37)
BACTERIA # UR AUTO: ABNORMAL
BASOPHILS # BLD AUTO: 0 K/UL — SIGNIFICANT CHANGE UP (ref 0–0.2)
BASOPHILS NFR BLD AUTO: 0 % — SIGNIFICANT CHANGE UP (ref 0–2)
BILIRUB SERPL-MCNC: 1.1 MG/DL — SIGNIFICANT CHANGE UP (ref 0.2–1.2)
BILIRUB UR-MCNC: NEGATIVE — SIGNIFICANT CHANGE UP
BUN SERPL-MCNC: 13 MG/DL — SIGNIFICANT CHANGE UP (ref 7–23)
CALCIUM SERPL-MCNC: 9.8 MG/DL — SIGNIFICANT CHANGE UP (ref 8.5–10.1)
CHLORIDE SERPL-SCNC: 104 MMOL/L — SIGNIFICANT CHANGE UP (ref 96–108)
CO2 SERPL-SCNC: 24 MMOL/L — SIGNIFICANT CHANGE UP (ref 22–31)
COLOR SPEC: YELLOW — SIGNIFICANT CHANGE UP
COMMENT - URINE: SIGNIFICANT CHANGE UP
CREAT SERPL-MCNC: 1.26 MG/DL — SIGNIFICANT CHANGE UP (ref 0.5–1.3)
DIFF PNL FLD: ABNORMAL
EGFR: 62 ML/MIN/1.73M2 — SIGNIFICANT CHANGE UP
EOSINOPHIL # BLD AUTO: 0 K/UL — SIGNIFICANT CHANGE UP (ref 0–0.5)
EOSINOPHIL NFR BLD AUTO: 0 % — SIGNIFICANT CHANGE UP (ref 0–6)
EPI CELLS # UR: SIGNIFICANT CHANGE UP
GLUCOSE SERPL-MCNC: 127 MG/DL — HIGH (ref 70–99)
GLUCOSE UR QL: NEGATIVE — SIGNIFICANT CHANGE UP
HCT VFR BLD CALC: 33.4 % — LOW (ref 39–50)
HGB BLD-MCNC: 11.4 G/DL — LOW (ref 13–17)
INR BLD: 1.45 RATIO — HIGH (ref 0.88–1.16)
KETONES UR-MCNC: NEGATIVE — SIGNIFICANT CHANGE UP
LACTATE SERPL-SCNC: 2.1 MMOL/L — HIGH (ref 0.7–2)
LEUKOCYTE ESTERASE UR-ACNC: ABNORMAL
LYMPHOCYTES # BLD AUTO: 1.7 K/UL — SIGNIFICANT CHANGE UP (ref 1–3.3)
LYMPHOCYTES # BLD AUTO: 16 % — SIGNIFICANT CHANGE UP (ref 13–44)
MANUAL SMEAR VERIFICATION: SIGNIFICANT CHANGE UP
MCHC RBC-ENTMCNC: 31.2 PG — SIGNIFICANT CHANGE UP (ref 27–34)
MCHC RBC-ENTMCNC: 34.1 GM/DL — SIGNIFICANT CHANGE UP (ref 32–36)
MCV RBC AUTO: 91.5 FL — SIGNIFICANT CHANGE UP (ref 80–100)
MONOCYTES # BLD AUTO: 1.7 K/UL — HIGH (ref 0–0.9)
MONOCYTES NFR BLD AUTO: 16 % — HIGH (ref 2–14)
NEUTROPHILS # BLD AUTO: 7.21 K/UL — SIGNIFICANT CHANGE UP (ref 1.8–7.4)
NEUTROPHILS NFR BLD AUTO: 68 % — SIGNIFICANT CHANGE UP (ref 43–77)
NITRITE UR-MCNC: NEGATIVE — SIGNIFICANT CHANGE UP
NRBC # BLD: 0 /100 — SIGNIFICANT CHANGE UP (ref 0–0)
NRBC # BLD: SIGNIFICANT CHANGE UP /100 WBCS (ref 0–0)
PH UR: 7 — SIGNIFICANT CHANGE UP (ref 5–8)
PLAT MORPH BLD: NORMAL — SIGNIFICANT CHANGE UP
PLATELET # BLD AUTO: 310 K/UL — SIGNIFICANT CHANGE UP (ref 150–400)
POTASSIUM SERPL-MCNC: 4.1 MMOL/L — SIGNIFICANT CHANGE UP (ref 3.5–5.3)
POTASSIUM SERPL-SCNC: 4.1 MMOL/L — SIGNIFICANT CHANGE UP (ref 3.5–5.3)
PROT SERPL-MCNC: 8.5 GM/DL — HIGH (ref 6–8.3)
PROT UR-MCNC: 30 MG/DL
PROTHROM AB SERPL-ACNC: 16.9 SEC — HIGH (ref 10.5–13.4)
RAPID RVP RESULT: SIGNIFICANT CHANGE UP
RBC # BLD: 3.65 M/UL — LOW (ref 4.2–5.8)
RBC # FLD: 13.5 % — SIGNIFICANT CHANGE UP (ref 10.3–14.5)
RBC BLD AUTO: NORMAL — SIGNIFICANT CHANGE UP
RBC CASTS # UR COMP ASSIST: ABNORMAL /HPF (ref 0–4)
SARS-COV-2 RNA SPEC QL NAA+PROBE: SIGNIFICANT CHANGE UP
SODIUM SERPL-SCNC: 135 MMOL/L — SIGNIFICANT CHANGE UP (ref 135–145)
SP GR SPEC: 1.01 — SIGNIFICANT CHANGE UP (ref 1.01–1.02)
UROBILINOGEN FLD QL: NEGATIVE — SIGNIFICANT CHANGE UP
WBC # BLD: 10.61 K/UL — HIGH (ref 3.8–10.5)
WBC # FLD AUTO: 10.61 K/UL — HIGH (ref 3.8–10.5)
WBC UR QL: ABNORMAL /HPF (ref 0–5)

## 2023-02-24 PROCEDURE — 85025 COMPLETE CBC W/AUTO DIFF WBC: CPT

## 2023-02-24 PROCEDURE — 71250 CT THORAX DX C-: CPT | Mod: MG

## 2023-02-24 PROCEDURE — 36415 COLL VENOUS BLD VENIPUNCTURE: CPT

## 2023-02-24 PROCEDURE — G1004: CPT

## 2023-02-24 PROCEDURE — 81001 URINALYSIS AUTO W/SCOPE: CPT

## 2023-02-24 PROCEDURE — 87086 URINE CULTURE/COLONY COUNT: CPT

## 2023-02-24 PROCEDURE — 93010 ELECTROCARDIOGRAM REPORT: CPT

## 2023-02-24 PROCEDURE — 87186 SC STD MICRODIL/AGAR DIL: CPT

## 2023-02-24 PROCEDURE — 99285 EMERGENCY DEPT VISIT HI MDM: CPT | Mod: 25

## 2023-02-24 PROCEDURE — 93005 ELECTROCARDIOGRAM TRACING: CPT

## 2023-02-24 PROCEDURE — 85610 PROTHROMBIN TIME: CPT

## 2023-02-24 PROCEDURE — 87040 BLOOD CULTURE FOR BACTERIA: CPT

## 2023-02-24 PROCEDURE — 80053 COMPREHEN METABOLIC PANEL: CPT

## 2023-02-24 PROCEDURE — 71250 CT THORAX DX C-: CPT | Mod: 26,MG

## 2023-02-24 PROCEDURE — 0225U NFCT DS DNA&RNA 21 SARSCOV2: CPT

## 2023-02-24 PROCEDURE — 83605 ASSAY OF LACTIC ACID: CPT

## 2023-02-24 PROCEDURE — 85730 THROMBOPLASTIN TIME PARTIAL: CPT

## 2023-02-24 PROCEDURE — 99284 EMERGENCY DEPT VISIT MOD MDM: CPT

## 2023-02-24 RX ORDER — SODIUM CHLORIDE 9 MG/ML
2900 INJECTION INTRAMUSCULAR; INTRAVENOUS; SUBCUTANEOUS ONCE
Refills: 0 | Status: COMPLETED | OUTPATIENT
Start: 2023-02-24 | End: 2023-02-24

## 2023-02-24 RX ORDER — CIPROFLOXACIN LACTATE 400MG/40ML
1 VIAL (ML) INTRAVENOUS
Qty: 20 | Refills: 0
Start: 2023-02-24 | End: 2023-03-05

## 2023-02-24 RX ORDER — CIPROFLOXACIN LACTATE 400MG/40ML
500 VIAL (ML) INTRAVENOUS ONCE
Refills: 0 | Status: COMPLETED | OUTPATIENT
Start: 2023-02-24 | End: 2023-02-24

## 2023-02-24 RX ADMIN — Medication 500 MILLIGRAM(S): at 22:17

## 2023-02-24 RX ADMIN — SODIUM CHLORIDE 2900 MILLILITER(S): 9 INJECTION INTRAMUSCULAR; INTRAVENOUS; SUBCUTANEOUS at 20:54

## 2023-02-24 NOTE — ED ADULT NURSE NOTE - NSIMPLEMENTINTERV_GEN_ALL_ED
Implemented All Universal Safety Interventions:  [place] to call system. Call bell, personal items and telephone within reach. Instruct patient to call for assistance. Room bathroom lighting operational. Non-slip footwear when patient is off stretcher. Physically safe environment: no spills, clutter or unnecessary equipment. Stretcher in lowest position, wheels locked, appropriate side rails in place.

## 2023-02-24 NOTE — ED STATDOCS - CLINICAL SUMMARY MEDICAL DECISION MAKING FREE TEXT BOX
Pt with fevers after recently recovering from viral syndrome, possible new viral syndrome vs pneumonia. Will check labs, CT chest, reassess.

## 2023-02-24 NOTE — ED STATDOCS - PATIENT PORTAL LINK FT
You can access the FollowMyHealth Patient Portal offered by [facility] by registering at the following website: [email or web address]. By joining [facility]’s FollowMyHealth portal, you will also be able to view your health information using other applications (apps) compatible with our system.

## 2023-02-24 NOTE — ED STATDOCS - OBJECTIVE STATEMENT
68 y/o male with PMHx of HLD presents to ED c/o viral syndrome since 10 days ago, intermittent fevers x 5 days. Pt reports symptoms had improved, when he started to have chills, weakness, and tactile fevers. + Occasional cough. Denies dysuria and urinary frequency. Pt went to , where CXR was negative.

## 2023-02-24 NOTE — ED ADULT NURSE NOTE - CHIEF COMPLAINT QUOTE
patient sent to ED from , speaking in full sentences, complaining of chills, fevers, fatigue, HA, nausea, and SOB [id number]. Tmax 100 relieved by tylenol. denies chest pain

## 2023-02-24 NOTE — ED ADULT NURSE NOTE - OBJECTIVE STATEMENT
68 y/o male presents to the ED c/o viral syndrome since 10 days ago, intermittent fevers x 5 days. Pt reports symptoms had improved, when he started to have chills, weakness, and tactile fevers. + Occasional cough. Denies dysuria and urinary frequency

## 2023-02-24 NOTE — ED STATDOCS - NSFOLLOWUPINSTRUCTIONS_ED_ALL_ED_FT
Urinary Tract Infection, Adult       A urinary tract infection (UTI) is an infection of any part of the urinary tract. The urinary tract includes the kidneys, ureters, bladder, and urethra. These organs make, store, and get rid of urine in the body.    An upper UTI affects the ureters and kidneys. A lower UTI affects the bladder and urethra.      What are the causes?    Most urinary tract infections are caused by bacteria in your genital area around your urethra, where urine leaves your body. These bacteria grow and cause inflammation of your urinary tract.      What increases the risk?    You are more likely to develop this condition if:  •You have a urinary catheter that stays in place.      •You are not able to control when you urinate or have a bowel movement (incontinence).    •You are female and you:  •Use a spermicide or diaphragm for birth control.      •Have low estrogen levels.      •Are pregnant.        •You have certain genes that increase your risk.      •You are sexually active.      •You take antibiotic medicines.    •You have a condition that causes your flow of urine to slow down, such as:  •An enlarged prostate, if you are male.      •Blockage in your urethra.      •A kidney stone.      •A nerve condition that affects your bladder control (neurogenic bladder).      •Not getting enough to drink, or not urinating often.      •You have certain medical conditions, such as:  •Diabetes.      •A weak disease-fighting system (immunesystem).      •Sickle cell disease.      •Gout.      •Spinal cord injury.          What are the signs or symptoms?    Symptoms of this condition include:  •Needing to urinate right away (urgency).      •Frequent urination. This may include small amounts of urine each time you urinate.      •Pain or burning with urination.      •Blood in the urine.      •Urine that smells bad or unusual.      •Trouble urinating.      •Cloudy urine.      •Vaginal discharge, if you are female.      •Pain in the abdomen or the lower back.      You may also have:  •Vomiting or a decreased appetite.      •Confusion.      •Irritability or tiredness.      •A fever or chills.      •Diarrhea.      The first symptom in older adults may be confusion. In some cases, they may not have any symptoms until the infection has worsened.      How is this diagnosed?    This condition is diagnosed based on your medical history and a physical exam. You may also have other tests, including:  •Urine tests.      •Blood tests.      •Tests for STIs (sexually transmitted infections).      If you have had more than one UTI, a cystoscopy or imaging studies may be done to determine the cause of the infections.      How is this treated?    Treatment for this condition includes:  •Antibiotic medicine.      •Over-the-counter medicines to treat discomfort.      •Drinking enough water to stay hydrated.      If you have frequent infections or have other conditions such as a kidney stone, you may need to see a health care provider who specializes in the urinary tract (urologist).    In rare cases, urinary tract infections can cause sepsis. Sepsis is a life-threatening condition that occurs when the body responds to an infection. Sepsis is treated in the hospital with IV antibiotics, fluids, and other medicines.      Follow these instructions at home:     Medicines     •Take over-the-counter and prescription medicines only as told by your health care provider.      •If you were prescribed an antibiotic medicine, take it as told by your health care provider. Do not stop using the antibiotic even if you start to feel better.      General instructions   •Make sure you:  •Empty your bladder often and completely. Do not hold urine for long periods of time.      •Empty your bladder after sex.      •Wipe from front to back after urinating or having a bowel movement if you are female. Use each tissue only one time when you wipe.        •Drink enough fluid to keep your urine pale yellow.      •Keep all follow-up visits. This is important.        Contact a health care provider if:    •Your symptoms do not get better after 1–2 days.      •Your symptoms go away and then return.        Get help right away if:    •You have severe pain in your back or your lower abdomen.      •You have a fever or chills.      •You have nausea or vomiting.        Summary    •A urinary tract infection (UTI) is an infection of any part of the urinary tract, which includes the kidneys, ureters, bladder, and urethra.      •Most urinary tract infections are caused by bacteria in your genital area.      •Treatment for this condition often includes antibiotic medicines.      •If you were prescribed an antibiotic medicine, take it as told by your health care provider. Do not stop using the antibiotic even if you start to feel better.      •Keep all follow-up visits. This is important.      This information is not intended to replace advice given to you by your health care provider. Make sure you discuss any questions you have with your health care provider.      Document Revised: 07/30/2021 Document Reviewed: 07/30/2021    Elsevier Patient Education © 2023 [employer]

## 2023-02-24 NOTE — ED ADULT NURSE NOTE - NSICDXPASTMEDICALHX_GEN_ALL_CORE_FT
PAST MEDICAL HISTORY:  DM (diabetes mellitus), type 2     History of staph infection lumbar spine    HLD (hyperlipidemia)     Hypothyroidism     Kidney stones        Consent (Lip)/Introductory Paragraph: The rationale for Mohs was explained to the patient and consent was obtained. The risks, benefits and alternatives to therapy were discussed in detail. Specifically, the risks of lip deformity, changes in the oral aperture, infection, scarring, bleeding, prolonged wound healing, incomplete removal, allergy to anesthesia, nerve injury and recurrence were addressed. Prior to the procedure, the treatment site was clearly identified and confirmed by the patient. All components of Universal Protocol/PAUSE Rule completed.

## 2023-02-24 NOTE — ED STATDOCS - NS ED ROS FT
Gen: + Fevers, chills, general weakness   Eyes: No eye irritation or discharge  ENT: No ear pain, congestion, sore throat  Resp: + Cough, no trouble breathing   Cardiovascular: No chest pain or palpitation  Gastroenteric: No nausea/vomiting, diarrhea, constipation  :  No change in urine output; no dysuria  MS: No joint or muscle pain  Skin: No rashes  Neuro: No headache; no abnormal movements  Remainder negative, except as per the HPI

## 2023-03-24 LAB
ALBUMIN SERPL ELPH-MCNC: 4.5 G/DL
ALP BLD-CCNC: 56 U/L
ALT SERPL-CCNC: 9 U/L
ANION GAP SERPL CALC-SCNC: 16 MMOL/L
AST SERPL-CCNC: 14 U/L
BASOPHILS # BLD AUTO: 0.07 K/UL
BASOPHILS NFR BLD AUTO: 1.9 %
BILIRUB SERPL-MCNC: 0.6 MG/DL
BUN SERPL-MCNC: 20 MG/DL
CALCIUM SERPL-MCNC: 9.8 MG/DL
CHLORIDE SERPL-SCNC: 106 MMOL/L
CHOLEST SERPL-MCNC: 105 MG/DL
CO2 SERPL-SCNC: 20 MMOL/L
CREAT SERPL-MCNC: 1.1 MG/DL
CREAT SPEC-SCNC: 111 MG/DL
EGFR: 73 ML/MIN/1.73M2
EOSINOPHIL # BLD AUTO: 0.02 K/UL
EOSINOPHIL NFR BLD AUTO: 0.6 %
ESTIMATED AVERAGE GLUCOSE: 131 MG/DL
GLUCOSE SERPL-MCNC: 167 MG/DL
HBA1C MFR BLD HPLC: 6.2 %
HCT VFR BLD CALC: 32.4 %
HDLC SERPL-MCNC: 28 MG/DL
HGB BLD-MCNC: 10.4 G/DL
IMM GRANULOCYTES NFR BLD AUTO: 0.3 %
LDLC SERPL CALC-MCNC: 47 MG/DL
LYMPHOCYTES # BLD AUTO: 1.95 K/UL
LYMPHOCYTES NFR BLD AUTO: 53.9 %
MAN DIFF?: NORMAL
MCHC RBC-ENTMCNC: 30.4 PG
MCHC RBC-ENTMCNC: 32.1 GM/DL
MCV RBC AUTO: 94.7 FL
MICROALBUMIN 24H UR DL<=1MG/L-MCNC: 4.2 MG/DL
MICROALBUMIN/CREAT 24H UR-RTO: 38 MG/G
MONOCYTES # BLD AUTO: 0.49 K/UL
MONOCYTES NFR BLD AUTO: 13.5 %
NEUTROPHILS # BLD AUTO: 1.08 K/UL
NEUTROPHILS NFR BLD AUTO: 29.8 %
NONHDLC SERPL-MCNC: 77 MG/DL
PLATELET # BLD AUTO: 141 K/UL
POTASSIUM SERPL-SCNC: 4.5 MMOL/L
PROT SERPL-MCNC: 7.8 G/DL
RBC # BLD: 3.42 M/UL
RBC # FLD: 14.4 %
SODIUM SERPL-SCNC: 141 MMOL/L
TRIGL SERPL-MCNC: 149 MG/DL
WBC # FLD AUTO: 3.62 K/UL

## 2023-03-29 ENCOUNTER — APPOINTMENT (OUTPATIENT)
Dept: ENDOCRINOLOGY | Facility: CLINIC | Age: 68
End: 2023-03-29
Payer: COMMERCIAL

## 2023-03-29 VITALS
HEIGHT: 72 IN | BODY MASS INDEX: 26.55 KG/M2 | DIASTOLIC BLOOD PRESSURE: 80 MMHG | SYSTOLIC BLOOD PRESSURE: 120 MMHG | WEIGHT: 196 LBS

## 2023-03-29 DIAGNOSIS — Z79.84 LONG TERM (CURRENT) USE OF ORAL HYPOGLYCEMIC DRUGS: ICD-10-CM

## 2023-03-29 LAB — GLUCOSE BLDC GLUCOMTR-MCNC: 113

## 2023-03-29 PROCEDURE — 99214 OFFICE O/P EST MOD 30 MIN: CPT | Mod: 25

## 2023-03-29 PROCEDURE — 82962 GLUCOSE BLOOD TEST: CPT

## 2023-03-29 RX ORDER — LEVOTHYROXINE SODIUM 0.11 MG/1
112 TABLET ORAL
Refills: 0 | Status: ACTIVE | COMMUNITY

## 2023-03-29 RX ORDER — SULFAMETHOXAZOLE AND TRIMETHOPRIM 800; 160 MG/1; MG/1
800-160 TABLET ORAL
Qty: 20 | Refills: 0 | Status: DISCONTINUED | COMMUNITY
Start: 2022-06-10 | End: 2023-03-29

## 2023-03-29 NOTE — ASSESSMENT
[FreeTextEntry1] : 67 year old male with T2DM, hypothyroidism, hypertension, and hyperlipidemia. Glycemic control is acceptable.\par \par 1. T2DM- A1c 6.2%.\par -Continue Metformin and Ozempic.\par -Reviewed importance of lifestyle modifications- diet, exercise, and weight loss- to maintain glycemic control.\par -Watch carbohydrate content of diet, especially at breakfast. Reviewed balanced meals with carb, protein, fiber.\par -Continue SMBG.\par -Monitor BG, appetite, weight. Can increase Ozempic if needed.\par -Continue to follow-up with nephrology.\par -Needs updated eye exam with ophthalmologist.\par -Repeat A1c and RTO for follow-up with MD in 3 months.\par \par 2. Hypothyroidism- PCP reduced LT4 dose 2 months ago.\par -Continue current dose of LT4.\par -Will obtain labs from PCP.\par -Repeat TFTs now.\par \par 3. Hyperlipidemia- LDL acceptable.\par -Continue statin.\par -Repeat lipids in 3 months.\par \par 4. Hypertension- controlled.\par -Continue current reigmen.\par \par 5. Anemia- saw hematology in the past and attributed to CKD, but now with worsening Hgb and also low platelets and low WBC. Advised follow-up with hematology.\par

## 2023-03-29 NOTE — HISTORY OF PRESENT ILLNESS
[FreeTextEntry1] : INTERVAL HISTORY: COVID  in 2022, then ER visit 2023 for UTI. Lost close to 30 pounds during time of illnesses but now has gained a few pounds back and is starting to notice is appetite is coming back.\par \par Type: 2\par Severity: moderate\par Duration: chronic\par \par Associated symptoms-\par Last eye exam: annually, denies DR\par Last foot exam: mild numbness/tingling in B/L feet, stable\par Kidney disease: CKD 3, saw nephrology 3 months ago\par Heart disease: none\par \par Modifying factors-\par Current meds for glycemic control:\par Metformin 1000 mg BID\par Ozempic 0.5 mg weekly\par \par Diet: increased appetite. Does not eat enough fruits and vegetables. Eats a lot of carbohydrates\par Weight: Lost close to 30 pounds after COVID and with UTI, not gained 4 pounds back\par Exercise: walks 4-5x per week, gym 3-4x per week\par \par Rare SMBG, once weekly at vary times. Reports BG is 110s to 120s. \par Current B, ate shredded wheat cereal with honey and blueberries for breakfast 4 hours ago\par ========================\par Hypothyroidism\par Current regimen: LT4 112 mcg daily, takes appropriately

## 2023-03-29 NOTE — REVIEW OF SYSTEMS
[Polyuria] : polyuria [Pain/Numbness of Digits] : pain/numbness of digits [Decreased Appetite] : appetite not decreased [Recent Weight Loss (___ Lbs)] : no recent weight loss [Chest Pain] : no chest pain [Palpitations] : no palpitations [Shortness Of Breath] : no shortness of breath [Nausea] : no nausea [Abdominal Pain] : no abdominal pain [Vomiting] : no vomiting [Polydipsia] : no polydipsia

## 2023-04-03 ENCOUNTER — LABORATORY RESULT (OUTPATIENT)
Age: 68
End: 2023-04-03

## 2023-04-05 ENCOUNTER — RX RENEWAL (OUTPATIENT)
Age: 68
End: 2023-04-05

## 2023-04-05 RX ORDER — LEVOTHYROXINE SODIUM 0.12 MG/1
125 TABLET ORAL
Qty: 90 | Refills: 1 | Status: ACTIVE | COMMUNITY
Start: 2020-07-23 | End: 1900-01-01

## 2023-04-24 ENCOUNTER — OUTPATIENT (OUTPATIENT)
Dept: OUTPATIENT SERVICES | Facility: HOSPITAL | Age: 68
LOS: 1 days | Discharge: HOME | End: 2023-04-24

## 2023-04-24 DIAGNOSIS — Z98.890 OTHER SPECIFIED POSTPROCEDURAL STATES: Chronic | ICD-10-CM

## 2023-04-24 DIAGNOSIS — D64.9 ANEMIA, UNSPECIFIED: ICD-10-CM

## 2023-05-01 ENCOUNTER — APPOINTMENT (OUTPATIENT)
Dept: HEMATOLOGY ONCOLOGY | Facility: CLINIC | Age: 68
End: 2023-05-01
Payer: COMMERCIAL

## 2023-05-01 ENCOUNTER — RESULT REVIEW (OUTPATIENT)
Age: 68
End: 2023-05-01

## 2023-05-01 ENCOUNTER — TRANSCRIPTION ENCOUNTER (OUTPATIENT)
Age: 68
End: 2023-05-01

## 2023-05-01 VITALS
WEIGHT: 198.31 LBS | BODY MASS INDEX: 26.86 KG/M2 | SYSTOLIC BLOOD PRESSURE: 131 MMHG | HEIGHT: 72 IN | DIASTOLIC BLOOD PRESSURE: 83 MMHG | HEART RATE: 64 BPM | OXYGEN SATURATION: 99 % | TEMPERATURE: 97.3 F

## 2023-05-01 LAB
BASOPHILS # BLD AUTO: 0.1 K/UL — SIGNIFICANT CHANGE UP (ref 0–0.2)
BASOPHILS NFR BLD AUTO: 3 % — HIGH (ref 0–2)
EOSINOPHIL # BLD AUTO: 0 K/UL — SIGNIFICANT CHANGE UP (ref 0–0.5)
EOSINOPHIL NFR BLD AUTO: 0.9 % — SIGNIFICANT CHANGE UP (ref 0–6)
HCT VFR BLD CALC: 34.6 % — LOW (ref 39–50)
HGB BLD-MCNC: 12 G/DL — LOW (ref 13–17)
LYMPHOCYTES # BLD AUTO: 2 K/UL — SIGNIFICANT CHANGE UP (ref 1–3.3)
LYMPHOCYTES # BLD AUTO: 44.6 % — HIGH (ref 13–44)
MCHC RBC-ENTMCNC: 31.3 PG — SIGNIFICANT CHANGE UP (ref 27–34)
MCHC RBC-ENTMCNC: 34.7 G/DL — SIGNIFICANT CHANGE UP (ref 32–36)
MCV RBC AUTO: 90.1 FL — SIGNIFICANT CHANGE UP (ref 80–100)
MONOCYTES # BLD AUTO: 0.6 K/UL — SIGNIFICANT CHANGE UP (ref 0–0.9)
MONOCYTES NFR BLD AUTO: 13.4 % — SIGNIFICANT CHANGE UP (ref 2–14)
NEUTROPHILS # BLD AUTO: 1.7 K/UL — LOW (ref 1.8–7.4)
NEUTROPHILS NFR BLD AUTO: 38.1 % — LOW (ref 43–77)
PLATELET # BLD AUTO: 161 K/UL — SIGNIFICANT CHANGE UP (ref 150–400)
RBC # BLD: 3.84 M/UL — LOW (ref 4.2–5.8)
RBC # FLD: 13.6 % — SIGNIFICANT CHANGE UP (ref 10.3–14.5)
WBC # BLD: 4.5 K/UL — SIGNIFICANT CHANGE UP (ref 3.8–10.5)
WBC # FLD AUTO: 4.5 K/UL — SIGNIFICANT CHANGE UP (ref 3.8–10.5)

## 2023-05-01 PROCEDURE — 99213 OFFICE O/P EST LOW 20 MIN: CPT

## 2023-05-01 NOTE — ASSESSMENT
[FreeTextEntry1] : CBC back to baseline following recent UTI\par Hgb. 12 grams, WBC and platelets normal\par No immature white cell forms noted on smear\par \par 3/23/23 AST 14, ALT 9, Alk Phos 56, eGFR 73\par 05/01/2023 Today's CBC: WBC 4.5 K, HGB 12.0 g, HCT 34.6 %,  K, ANC 1700\par \par Pt with improving CBC since 3/2023, noted depressed  Hb, WBC, ANC and PLT values in 3/2023 likely due to UTI in 2/2023. \par \par Plan \par RTO in 6 months with repeat CBC check \par \par

## 2023-05-01 NOTE — HISTORY OF PRESENT ILLNESS
[de-identified] : This 64-year-old male was referred for evaluation of anemia, neutropenia and lymphocytosis.  In January of this year his white count was 3.4, hemoglobin 12.3, and the differential showed 35 neutrophils 45, lymphocytes and 16 monocytes.\par \par 7/20 -  CBC showed a white count of 6.7, hemoglobin 12.9 g, platelets of 178,000.  Differential 57 neutrophils 26 lymphocytes 15 monocytes and 2 basophils.\par \par B12, folate and ferritin levels are all normal.  BUN is 18 and creatinine 1.33 corresponding to an EGFR of 55 mL/min. [de-identified] : 3/23/23 AST 14, ALT 9, Alk Phos 56, eGFR 73\par WBC 6.2, Hgb. 10.4, platelets 141,000\par 05/01/2023 Today's CBC: WBC 4.5 K, HGB 12.0 g, HCT 34.6 %,  K, ANC 1700\par \par Returns for follow up visit \par Reports mild fatigue \par Reports recent UTI in 2/2023, went to ED,  completed outpatient tabx course. \par \par Reports had kidney stone with infection in Summer 2022\par \par 5/1/23 - hgb. back \par \par

## 2023-05-01 NOTE — REVIEW OF SYSTEMS
[Negative] : Allergic/Immunologic [FreeTextEntry2] : negative except as reviewed in interval history

## 2023-05-01 NOTE — ADDENDUM
[FreeTextEntry1] : Documented by Becca Carrasco  acting as scribe for Dr. Espinosa on  05/01/2023.\par \par All Medical record entries made by the Scribe were at my, Dr. Espinosa's, direction and personally dictated by me on  05/01/2023. I have reviewed the chart and agree that the record accurately reflects my personal performance of the history, physical exam, assessment and plan. I have also personally directed, reviewed, and agreed with the discharge instructions.

## 2023-05-10 ENCOUNTER — NON-APPOINTMENT (OUTPATIENT)
Age: 68
End: 2023-05-10

## 2023-05-24 ENCOUNTER — LABORATORY RESULT (OUTPATIENT)
Age: 68
End: 2023-05-24

## 2023-06-01 ENCOUNTER — APPOINTMENT (OUTPATIENT)
Dept: ENDOCRINOLOGY | Facility: CLINIC | Age: 68
End: 2023-06-01
Payer: COMMERCIAL

## 2023-06-01 VITALS — OXYGEN SATURATION: 99 % | BODY MASS INDEX: 26.82 KG/M2 | HEIGHT: 72 IN | WEIGHT: 198 LBS | HEART RATE: 84 BPM

## 2023-06-01 VITALS — DIASTOLIC BLOOD PRESSURE: 78 MMHG | SYSTOLIC BLOOD PRESSURE: 118 MMHG

## 2023-06-01 PROCEDURE — 99214 OFFICE O/P EST MOD 30 MIN: CPT

## 2023-06-01 NOTE — PHYSICAL EXAM
[Alert] : alert [Well Nourished] : well nourished [No Acute Distress] : no acute distress [Well Developed] : well developed [Normal Sclera/Conjunctiva] : normal sclera/conjunctiva [EOMI] : extra ocular movement intact [No Proptosis] : no proptosis [Normal Oropharynx] : the oropharynx was normal [No Respiratory Distress] : no respiratory distress [No Accessory Muscle Use] : no accessory muscle use [Clear to Auscultation] : lungs were clear to auscultation bilaterally [Normal S1, S2] : normal S1 and S2 [Normal Rate] : heart rate was normal [Regular Rhythm] : with a regular rhythm [No Edema] : no peripheral edema [Pedal Pulses Normal] : the pedal pulses are present [Normal Bowel Sounds] : normal bowel sounds [Not Tender] : non-tender [Not Distended] : not distended [Soft] : abdomen soft [Normal Anterior Cervical Nodes] : no anterior cervical lymphadenopathy [Oriented x3] : oriented to person, place, and time [Acanthosis Nigricans] : no acanthosis nigricans [de-identified] : L thyroid nodule palpated

## 2023-06-01 NOTE — ASSESSMENT
[Exercise/Effect on Glucose] : exercise/effect on glucose [Self Monitoring of Blood Glucose] : self monitoring of blood glucose [Retinopathy Screening] : Patient was referred to ophthalmology for retinopathy screening [FreeTextEntry1] : Dm 2 well controlled in patient with HTN, HLD, obesity.  \par \par Dm2: a1c 6.0 \par cont ozempic  and  metformin 1000 mg BID \par bgs 2x day \par CKD stage III : monitor. GFR  stable. \par eye exam Utd \par \par BP:  bp at target on meds. Continue current management.\par \par HLD: lipids at target.  Continue atorvastatin 10 mg qd and continue fenofibrate \par \par obesity: reviewed diet and exercise  \par \par hypoT : tfts normal .cont  Lt4 125 mcg qd   \par \par Vitamin d defic: double his dose he takes at home \par \par Anemia: seen by hematology and felt it was secondary to CKD / UTI .

## 2023-07-05 NOTE — ED ADULT TRIAGE NOTE - LOCATION:
Left arm; You can access the FollowMyHealth Patient Portal offered by [facility] by registering at the following website: [email or web address]. By joining [facility]’s FollowMyHealth portal, you will also be able to view your health information using other applications (apps) compatible with our system.

## 2023-07-25 ENCOUNTER — APPOINTMENT (OUTPATIENT)
Dept: UROLOGY | Facility: CLINIC | Age: 68
End: 2023-07-25
Payer: COMMERCIAL

## 2023-07-25 VITALS
BODY MASS INDEX: 26.82 KG/M2 | HEIGHT: 72 IN | DIASTOLIC BLOOD PRESSURE: 74 MMHG | SYSTOLIC BLOOD PRESSURE: 134 MMHG | WEIGHT: 198 LBS

## 2023-07-25 DIAGNOSIS — N20.0 CALCULUS OF KIDNEY: ICD-10-CM

## 2023-07-25 PROCEDURE — 99203 OFFICE O/P NEW LOW 30 MIN: CPT

## 2023-07-25 NOTE — ASSESSMENT
[FreeTextEntry1] : 68 yo Male with history of kidney stones, BPH with LUTS most bothersome symptom being urinary frequency and nocturia.\par The anatomy of the lower genitourinary tract was explained to the patient, with the urine passing through the bladder neck and prostatic urethra as it exits the body. Prostatic enlargement can constrict the lumen and the bladder outlet, causing incomplete bladder emptying and irritative symptoms such and frequency and urgency. Alpha blockers can be used to relax the bladder neck and facilitate passage of urine from the bladder. 5-alpha reductase inhibitors can be employed to shrink the prostate and thereby allow for passage of urine more easily. Anticholinergics to decrease bladder irritative symptoms were also discussed, but it was stressed that they can increase post void residual and risk urinary retention.\par Discussed increasing Tamsulosin dose to 2 pills daily vs. Finasteride vs. WHEELER procedures. Pt prefers to increase his Tamsulosin dose to 0.8 mg for now.\par In Regards to Kidneys stones will obtain RBUS to assess stone burden and then obtain 24 hour urine for metabolic workup.\par For prostate cancer screening will obtain PSA and NICOLE was benign \par RTO in 6 months for symptom check or sooner PRN.

## 2023-07-25 NOTE — HISTORY OF PRESENT ILLNESS
[FreeTextEntry1] : 67 year old man seen 07/25/2023 presents to [facility] care. Patient reports he has history of kidney stones s/p ESWL x2, BPH with LUTS and 2 UTIs over the past year one due to an obstructing stones which he spontaneously passed. Patient reports he was following up with Dr. Ochoa until 6 months ago. He reports his PSAs have been normal. He reports urinary frequency and nocturia x2, he reports his symptoms have improved with Tamsulosin but could be better. \par It is mild in severity. Nothing makes the symptoms better, nothing makes sx worse. \par It is associated with nothing.\par No hematuria, no dysuria, no straining. No incontinence. \par No fevers, no chills, no nausea, no vomiting, no flank pain. \par  \par PVR 43 mL

## 2023-07-26 LAB
APPEARANCE: CLEAR
BACTERIA: ABNORMAL /HPF
BILIRUBIN URINE: NEGATIVE
BLOOD URINE: NEGATIVE
CAST: 1 /LPF
COLOR: YELLOW
EPITHELIAL CELLS: 2 /HPF
GLUCOSE QUALITATIVE U: NEGATIVE MG/DL
KETONES URINE: NEGATIVE MG/DL
LEUKOCYTE ESTERASE URINE: ABNORMAL
MICROSCOPIC-UA: NORMAL
NITRITE URINE: POSITIVE
PH URINE: 6
PROTEIN URINE: 30 MG/DL
PSA SERPL-MCNC: 2.57 NG/ML
RED BLOOD CELLS URINE: 2 /HPF
SPECIFIC GRAVITY URINE: 1.02
UROBILINOGEN URINE: 0.2 MG/DL
WHITE BLOOD CELLS URINE: 62 /HPF

## 2023-07-31 DIAGNOSIS — A49.9 URINARY TRACT INFECTION, SITE NOT SPECIFIED: ICD-10-CM

## 2023-07-31 DIAGNOSIS — N39.0 URINARY TRACT INFECTION, SITE NOT SPECIFIED: ICD-10-CM

## 2023-07-31 LAB — BACTERIA UR CULT: ABNORMAL

## 2023-07-31 RX ORDER — NITROFURANTOIN (MONOHYDRATE/MACROCRYSTALS) 25; 75 MG/1; MG/1
100 CAPSULE ORAL
Qty: 14 | Refills: 0 | Status: ACTIVE | COMMUNITY
Start: 2023-07-31 | End: 1900-01-01

## 2023-08-03 ENCOUNTER — APPOINTMENT (OUTPATIENT)
Dept: DERMATOLOGY | Facility: CLINIC | Age: 68
End: 2023-08-03
Payer: COMMERCIAL

## 2023-08-03 DIAGNOSIS — L82.1 OTHER SEBORRHEIC KERATOSIS: ICD-10-CM

## 2023-08-03 DIAGNOSIS — D18.00 HEMANGIOMA UNSPECIFIED SITE: ICD-10-CM

## 2023-08-03 PROCEDURE — 99214 OFFICE O/P EST MOD 30 MIN: CPT

## 2023-08-03 NOTE — PHYSICAL EXAM
[Full Body Skin Exam Performed] : performed [FreeTextEntry3] : Skin examination performed of the face, neck, trunk, arms, legs;  The patient is well, alert and oriented, pleasant and cooperative. Eyelids, conjunctivae, oral mucosa, digits and nails all normal.   No cervical adenopathy.  Normal findings include:  Seborrheic keratoses- largest on R lateral thigh Angiomas- some larger lesions on trunk Lentigines Multiple benign nevi were noted. Some nevi exhibited mildly atypical features, but none were suspicious for malignancy.  pedunculated fleshy tan papule L posterior thigh Large nevus spilus L lower abdomen-  Large, dark, regular compound nevus L lower back- 1.4cm;   No lesions suspicious for malignancy.

## 2023-08-03 NOTE — ASSESSMENT
[FreeTextEntry1] : Complete skin examination is negative for malignancy; Multiple new concerns were addressed and discussed. Therapeutic options and their risks and benefits; along with multiple diagnostic possibilities were discussed at length; risks and benefits of skin biopsy and/or other further study were discussed;  Continue regular exams; Multiple [place] nevi, also angiomas/SKs; larger nevus L lower back likely 2 adjacent nevi- nevus unchanged from prior measurement;   Follow up for TBSE in 1 year

## 2023-08-03 NOTE — HISTORY OF PRESENT ILLNESS
[de-identified] : Pt. presents for skin check; c/o few spots of concern;   Severity:  mild   Modifying factors:  none Associated symptoms:  none Context:  no association with activity Hx multiple nevi;

## 2023-08-03 NOTE — ASSESSMENT
[FreeTextEntry1] : Complete skin examination is negative for malignancy; Multiple new concerns were addressed and discussed. Therapeutic options and their risks and benefits; along with multiple diagnostic possibilities were discussed at length; risks and benefits of skin biopsy and/or other further study were discussed;  Continue regular exams; Multiple [place] nevi, also angiomas/SKs; larger nevus L lower back likely 2 adjacent nevi- nevus unchanged from prior measurement;   Follow up for TBSE in 1 year

## 2023-08-03 NOTE — HISTORY OF PRESENT ILLNESS
[de-identified] : Pt. presents for skin check; c/o few spots of concern;   Severity:  mild   Modifying factors:  none Associated symptoms:  none Context:  no association with activity Hx multiple nevi;

## 2023-08-07 ENCOUNTER — LABORATORY RESULT (OUTPATIENT)
Age: 68
End: 2023-08-07

## 2023-08-11 ENCOUNTER — OUTPATIENT (OUTPATIENT)
Dept: OUTPATIENT SERVICES | Facility: HOSPITAL | Age: 68
LOS: 1 days | End: 2023-08-11
Payer: COMMERCIAL

## 2023-08-11 ENCOUNTER — APPOINTMENT (OUTPATIENT)
Dept: ULTRASOUND IMAGING | Facility: CLINIC | Age: 68
End: 2023-08-11
Payer: COMMERCIAL

## 2023-08-11 DIAGNOSIS — Z98.890 OTHER SPECIFIED POSTPROCEDURAL STATES: Chronic | ICD-10-CM

## 2023-08-11 DIAGNOSIS — N20.0 CALCULUS OF KIDNEY: ICD-10-CM

## 2023-08-11 PROCEDURE — 76770 US EXAM ABDO BACK WALL COMP: CPT

## 2023-08-11 PROCEDURE — 76770 US EXAM ABDO BACK WALL COMP: CPT | Mod: 26

## 2023-08-18 ENCOUNTER — RX RENEWAL (OUTPATIENT)
Age: 68
End: 2023-08-18

## 2023-09-11 NOTE — PHARMACOTHERAPY INTERVENTION NOTE - INTERVENTION TYPE MED REC
Location of patient: [address] [place] [address] [place] call from Greg Ennis at [facility] with [employer]. Subjective: Caller states \"fell hitting his head Saturday night, was taken into the ED and got 3 stiches but since his fall, he is really more discombobulated. I can tell he is getting ready to pass out when I get him to bed\"     Current Symptoms: head injury on Saturday, 3 stitches. Wife reports worsening dementia symptoms. Unsure if patient was diagnosed with a concussion but was knocked unconscious when hit his head. Wife states when pt gets up, it looks as if he is getting ready to pass out. Onset: 2 days ago; worsening    Associated Symptoms: reduced activity    Pain Severity: Pt states no pain     Temperature: NA     What has been tried:     LMP: NA Pregnant: NA    Recommended disposition: Go to ED/UCC Now (Or to Office with PCP Approval)    Care advice provided, patient verbalizes understanding; denies any other questions or concerns; instructed to call back for any new or worsening symptoms. Writer provided warm transfer to [employer]  at [facility] for further assistance. Attention Provider: Thank you for allowing me to participate in the care of your patient. The patient was connected to triage in response to information provided to the ECC/PSC. Please do not respond through this encounter as the response is not directed to a shared pool.         Reason for Disposition   Concussion symptoms are worsening    Protocols used: Concussion (mTBI) Less Than 14 Days Ago Follow-Up Call-ADULT-[place]
Med Rec - Admission

## 2023-09-14 LAB
HCT VFR BLD CALC: 37.5 %
HGB BLD-MCNC: 11.8 G/DL
MCHC RBC-ENTMCNC: 30.2 PG
MCHC RBC-ENTMCNC: 31.5 GM/DL
MCV RBC AUTO: 95.9 FL
PLATELET # BLD AUTO: 136 K/UL
RBC # BLD: 3.91 M/UL
RBC # FLD: 14.2 %
WBC # FLD AUTO: 4.01 K/UL

## 2023-09-15 LAB
ALBUMIN SERPL ELPH-MCNC: 5 G/DL
ALP BLD-CCNC: 43 U/L
ALT SERPL-CCNC: 10 U/L
ANION GAP SERPL CALC-SCNC: 15 MMOL/L
AST SERPL-CCNC: 15 U/L
BILIRUB SERPL-MCNC: 0.8 MG/DL
BUN SERPL-MCNC: 17 MG/DL
CALCIUM SERPL-MCNC: 10.3 MG/DL
CHLORIDE SERPL-SCNC: 103 MMOL/L
CHOLEST SERPL-MCNC: 118 MG/DL
CO2 SERPL-SCNC: 21 MMOL/L
CREAT SERPL-MCNC: 1.2 MG/DL
CREAT SPEC-SCNC: 85 MG/DL
EGFR: 65 ML/MIN/1.73M2
ESTIMATED AVERAGE GLUCOSE: 126 MG/DL
GLUCOSE SERPL-MCNC: 112 MG/DL
HBA1C MFR BLD HPLC: 6 %
HDLC SERPL-MCNC: 30 MG/DL
LDLC SERPL CALC-MCNC: 58 MG/DL
MICROALBUMIN 24H UR DL<=1MG/L-MCNC: 3.8 MG/DL
MICROALBUMIN/CREAT 24H UR-RTO: 44 MG/G
NONHDLC SERPL-MCNC: 88 MG/DL
POTASSIUM SERPL-SCNC: 4.6 MMOL/L
PROT SERPL-MCNC: 7.8 G/DL
SODIUM SERPL-SCNC: 139 MMOL/L
T4 FREE SERPL-MCNC: 1.7 NG/DL
TRIGL SERPL-MCNC: 181 MG/DL
TSH SERPL-ACNC: 2.5 UIU/ML

## 2023-10-06 ENCOUNTER — APPOINTMENT (OUTPATIENT)
Dept: ENDOCRINOLOGY | Facility: CLINIC | Age: 68
End: 2023-10-06
Payer: COMMERCIAL

## 2023-10-06 VITALS
HEART RATE: 81 BPM | DIASTOLIC BLOOD PRESSURE: 80 MMHG | SYSTOLIC BLOOD PRESSURE: 132 MMHG | HEIGHT: 72 IN | BODY MASS INDEX: 26.82 KG/M2 | OXYGEN SATURATION: 98 % | WEIGHT: 198 LBS

## 2023-10-06 DIAGNOSIS — E04.9 NONTOXIC GOITER, UNSPECIFIED: ICD-10-CM

## 2023-10-06 PROCEDURE — 99214 OFFICE O/P EST MOD 30 MIN: CPT

## 2023-10-07 ENCOUNTER — APPOINTMENT (OUTPATIENT)
Dept: ULTRASOUND IMAGING | Facility: CLINIC | Age: 68
End: 2023-10-07
Payer: COMMERCIAL

## 2023-10-07 ENCOUNTER — OUTPATIENT (OUTPATIENT)
Dept: OUTPATIENT SERVICES | Facility: HOSPITAL | Age: 68
LOS: 1 days | End: 2023-10-07
Payer: COMMERCIAL

## 2023-10-07 DIAGNOSIS — E04.9 NONTOXIC GOITER, UNSPECIFIED: ICD-10-CM

## 2023-10-07 DIAGNOSIS — Z98.890 OTHER SPECIFIED POSTPROCEDURAL STATES: Chronic | ICD-10-CM

## 2023-10-07 PROCEDURE — 76536 US EXAM OF HEAD AND NECK: CPT | Mod: 26

## 2023-10-07 PROCEDURE — 76536 US EXAM OF HEAD AND NECK: CPT

## 2023-10-30 ENCOUNTER — OUTPATIENT (OUTPATIENT)
Dept: OUTPATIENT SERVICES | Facility: HOSPITAL | Age: 68
LOS: 1 days | Discharge: HOME | End: 2023-10-30

## 2023-10-30 DIAGNOSIS — D64.9 ANEMIA, UNSPECIFIED: ICD-10-CM

## 2023-10-30 DIAGNOSIS — Z98.890 OTHER SPECIFIED POSTPROCEDURAL STATES: Chronic | ICD-10-CM

## 2023-11-01 ENCOUNTER — RESULT REVIEW (OUTPATIENT)
Age: 68
End: 2023-11-01

## 2023-11-01 ENCOUNTER — APPOINTMENT (OUTPATIENT)
Dept: HEMATOLOGY ONCOLOGY | Facility: CLINIC | Age: 68
End: 2023-11-01
Payer: COMMERCIAL

## 2023-11-01 VITALS
WEIGHT: 201.61 LBS | DIASTOLIC BLOOD PRESSURE: 76 MMHG | BODY MASS INDEX: 27.31 KG/M2 | HEART RATE: 71 BPM | OXYGEN SATURATION: 99 % | HEIGHT: 72 IN | SYSTOLIC BLOOD PRESSURE: 118 MMHG

## 2023-11-01 DIAGNOSIS — D70.9 NEUTROPENIA, UNSPECIFIED: ICD-10-CM

## 2023-11-01 LAB
BASOPHILS # BLD AUTO: 0.1 K/UL — SIGNIFICANT CHANGE UP (ref 0–0.2)
BASOPHILS # BLD AUTO: 0.1 K/UL — SIGNIFICANT CHANGE UP (ref 0–0.2)
BASOPHILS NFR BLD AUTO: 4 % — HIGH (ref 0–2)
BASOPHILS NFR BLD AUTO: 4 % — HIGH (ref 0–2)
EOSINOPHIL # BLD AUTO: 0 K/UL — SIGNIFICANT CHANGE UP (ref 0–0.5)
EOSINOPHIL # BLD AUTO: 0 K/UL — SIGNIFICANT CHANGE UP (ref 0–0.5)
EOSINOPHIL NFR BLD AUTO: 1.2 % — SIGNIFICANT CHANGE UP (ref 0–6)
EOSINOPHIL NFR BLD AUTO: 1.2 % — SIGNIFICANT CHANGE UP (ref 0–6)
HCT VFR BLD CALC: 36.1 % — LOW (ref 39–50)
HCT VFR BLD CALC: 36.1 % — LOW (ref 39–50)
HGB BLD-MCNC: 12.4 G/DL — LOW (ref 13–17)
HGB BLD-MCNC: 12.4 G/DL — LOW (ref 13–17)
LYMPHOCYTES # BLD AUTO: 1.5 K/UL — SIGNIFICANT CHANGE UP (ref 1–3.3)
LYMPHOCYTES # BLD AUTO: 1.5 K/UL — SIGNIFICANT CHANGE UP (ref 1–3.3)
LYMPHOCYTES # BLD AUTO: 48.8 % — HIGH (ref 13–44)
LYMPHOCYTES # BLD AUTO: 48.8 % — HIGH (ref 13–44)
MCHC RBC-ENTMCNC: 31.8 PG — SIGNIFICANT CHANGE UP (ref 27–34)
MCHC RBC-ENTMCNC: 31.8 PG — SIGNIFICANT CHANGE UP (ref 27–34)
MCHC RBC-ENTMCNC: 34.3 G/DL — SIGNIFICANT CHANGE UP (ref 32–36)
MCHC RBC-ENTMCNC: 34.3 G/DL — SIGNIFICANT CHANGE UP (ref 32–36)
MCV RBC AUTO: 92.7 FL — SIGNIFICANT CHANGE UP (ref 80–100)
MCV RBC AUTO: 92.7 FL — SIGNIFICANT CHANGE UP (ref 80–100)
MONOCYTES # BLD AUTO: 0.4 K/UL — SIGNIFICANT CHANGE UP (ref 0–0.9)
MONOCYTES # BLD AUTO: 0.4 K/UL — SIGNIFICANT CHANGE UP (ref 0–0.9)
MONOCYTES NFR BLD AUTO: 13.3 % — SIGNIFICANT CHANGE UP (ref 2–14)
MONOCYTES NFR BLD AUTO: 13.3 % — SIGNIFICANT CHANGE UP (ref 2–14)
NEUTROPHILS # BLD AUTO: 1 K/UL — LOW (ref 1.8–7.4)
NEUTROPHILS # BLD AUTO: 1 K/UL — LOW (ref 1.8–7.4)
NEUTROPHILS NFR BLD AUTO: 32.6 % — LOW (ref 43–77)
NEUTROPHILS NFR BLD AUTO: 32.6 % — LOW (ref 43–77)
PLATELET # BLD AUTO: 124 K/UL — LOW (ref 150–400)
PLATELET # BLD AUTO: 124 K/UL — LOW (ref 150–400)
RBC # BLD: 3.89 M/UL — LOW (ref 4.2–5.8)
RBC # BLD: 3.89 M/UL — LOW (ref 4.2–5.8)
RBC # BLD: 3.93 M/UL
RBC # FLD: 12.1 % — SIGNIFICANT CHANGE UP (ref 10.3–14.5)
RBC # FLD: 12.1 % — SIGNIFICANT CHANGE UP (ref 10.3–14.5)
RETICS # AUTO: 2.1 %
RETICS AGGREG/RBC NFR: 82.5 K/UL
WBC # BLD: 3.1 K/UL — LOW (ref 3.8–10.5)
WBC # BLD: 3.1 K/UL — LOW (ref 3.8–10.5)
WBC # FLD AUTO: 3.1 K/UL — LOW (ref 3.8–10.5)
WBC # FLD AUTO: 3.1 K/UL — LOW (ref 3.8–10.5)

## 2023-11-01 PROCEDURE — 99215 OFFICE O/P EST HI 40 MIN: CPT

## 2023-11-03 LAB
ALBUMIN MFR SERPL ELPH: 58.4 %
ALBUMIN SERPL ELPH-MCNC: 4.8 G/DL
ALBUMIN SERPL-MCNC: 4.4 G/DL
ALBUMIN/GLOB SERPL: 1.4 RATIO
ALP BLD-CCNC: 44 U/L
ALPHA1 GLOB MFR SERPL ELPH: 3 %
ALPHA1 GLOB SERPL ELPH-MCNC: 0.2 G/DL
ALPHA2 GLOB MFR SERPL ELPH: 10.4 %
ALPHA2 GLOB SERPL ELPH-MCNC: 0.8 G/DL
ALT SERPL-CCNC: 12 U/L
ANION GAP SERPL CALC-SCNC: 13 MMOL/L
AST SERPL-CCNC: 13 U/L
B-GLOBULIN MFR SERPL ELPH: 12.6 %
B-GLOBULIN SERPL ELPH-MCNC: 1 G/DL
BILIRUB SERPL-MCNC: 0.6 MG/DL
BUN SERPL-MCNC: 19 MG/DL
CALCIUM SERPL-MCNC: 9.8 MG/DL
CHLORIDE SERPL-SCNC: 105 MMOL/L
CO2 SERPL-SCNC: 22 MMOL/L
CREAT SERPL-MCNC: 1.19 MG/DL
DEPRECATED KAPPA LC FREE/LAMBDA SER: 2.14 RATIO
EGFR: 66 ML/MIN/1.73M2
FERRITIN SERPL-MCNC: 145 NG/ML
FOLATE SERPL-MCNC: 5.1 NG/ML
GAMMA GLOB FLD ELPH-MCNC: 1.2 G/DL
GAMMA GLOB MFR SERPL ELPH: 15.6 %
GLUCOSE SERPL-MCNC: 117 MG/DL
HAV IGM SER QL: NONREACTIVE
HBV CORE IGM SER QL: NONREACTIVE
HBV SURFACE AG SER QL: NONREACTIVE
HCV AB SER QL: NONREACTIVE
HCV S/CO RATIO: 0.24 S/CO
HIV1+2 AB SPEC QL IA.RAPID: NONREACTIVE
IGA SER QL IEP: 444 MG/DL
IGG SER QL IEP: 1147 MG/DL
IGM SER QL IEP: 113 MG/DL
INTERPRETATION SERPL IEP-IMP: NORMAL
IRON SATN MFR SERPL: 21 %
IRON SERPL-MCNC: 78 UG/DL
KAPPA LC CSF-MCNC: 1.49 MG/DL
KAPPA LC SERPL-MCNC: 3.19 MG/DL
M PROTEIN SPEC IFE-MCNC: NORMAL
POTASSIUM SERPL-SCNC: 4.2 MMOL/L
PROT SERPL-MCNC: 7.6 G/DL
SODIUM SERPL-SCNC: 140 MMOL/L
TIBC SERPL-MCNC: 366 UG/DL
UIBC SERPL-MCNC: 288 UG/DL
VIT B12 SERPL-MCNC: 416 PG/ML

## 2023-12-06 ENCOUNTER — APPOINTMENT (OUTPATIENT)
Dept: HEMATOLOGY ONCOLOGY | Facility: CLINIC | Age: 68
End: 2023-12-06
Payer: COMMERCIAL

## 2023-12-06 ENCOUNTER — RESULT REVIEW (OUTPATIENT)
Age: 68
End: 2023-12-06

## 2023-12-06 VITALS
SYSTOLIC BLOOD PRESSURE: 124 MMHG | WEIGHT: 204.03 LBS | BODY MASS INDEX: 27.64 KG/M2 | HEART RATE: 60 BPM | TEMPERATURE: 97.5 F | OXYGEN SATURATION: 97 % | HEIGHT: 72 IN | DIASTOLIC BLOOD PRESSURE: 80 MMHG

## 2023-12-06 LAB
BASOPHILS # BLD AUTO: 0.1 K/UL — SIGNIFICANT CHANGE UP (ref 0–0.2)
BASOPHILS # BLD AUTO: 0.1 K/UL — SIGNIFICANT CHANGE UP (ref 0–0.2)
BASOPHILS NFR BLD AUTO: 3.7 % — HIGH (ref 0–2)
BASOPHILS NFR BLD AUTO: 3.7 % — HIGH (ref 0–2)
EOSINOPHIL # BLD AUTO: 0 K/UL — SIGNIFICANT CHANGE UP (ref 0–0.5)
EOSINOPHIL # BLD AUTO: 0 K/UL — SIGNIFICANT CHANGE UP (ref 0–0.5)
EOSINOPHIL NFR BLD AUTO: 0.9 % — SIGNIFICANT CHANGE UP (ref 0–6)
EOSINOPHIL NFR BLD AUTO: 0.9 % — SIGNIFICANT CHANGE UP (ref 0–6)
HCT VFR BLD CALC: 36.3 % — LOW (ref 39–50)
HCT VFR BLD CALC: 36.3 % — LOW (ref 39–50)
HGB BLD-MCNC: 12.6 G/DL — LOW (ref 13–17)
HGB BLD-MCNC: 12.6 G/DL — LOW (ref 13–17)
LYMPHOCYTES # BLD AUTO: 1.4 K/UL — SIGNIFICANT CHANGE UP (ref 1–3.3)
LYMPHOCYTES # BLD AUTO: 1.4 K/UL — SIGNIFICANT CHANGE UP (ref 1–3.3)
LYMPHOCYTES # BLD AUTO: 47.3 % — HIGH (ref 13–44)
LYMPHOCYTES # BLD AUTO: 47.3 % — HIGH (ref 13–44)
MCHC RBC-ENTMCNC: 31.9 PG — SIGNIFICANT CHANGE UP (ref 27–34)
MCHC RBC-ENTMCNC: 31.9 PG — SIGNIFICANT CHANGE UP (ref 27–34)
MCHC RBC-ENTMCNC: 34.6 G/DL — SIGNIFICANT CHANGE UP (ref 32–36)
MCHC RBC-ENTMCNC: 34.6 G/DL — SIGNIFICANT CHANGE UP (ref 32–36)
MCV RBC AUTO: 92.1 FL — SIGNIFICANT CHANGE UP (ref 80–100)
MCV RBC AUTO: 92.1 FL — SIGNIFICANT CHANGE UP (ref 80–100)
MONOCYTES # BLD AUTO: 0.4 K/UL — SIGNIFICANT CHANGE UP (ref 0–0.9)
MONOCYTES # BLD AUTO: 0.4 K/UL — SIGNIFICANT CHANGE UP (ref 0–0.9)
MONOCYTES NFR BLD AUTO: 11.9 % — SIGNIFICANT CHANGE UP (ref 2–14)
MONOCYTES NFR BLD AUTO: 11.9 % — SIGNIFICANT CHANGE UP (ref 2–14)
NEUTROPHILS # BLD AUTO: 1.1 K/UL — LOW (ref 1.8–7.4)
NEUTROPHILS # BLD AUTO: 1.1 K/UL — LOW (ref 1.8–7.4)
NEUTROPHILS NFR BLD AUTO: 36.2 % — LOW (ref 43–77)
NEUTROPHILS NFR BLD AUTO: 36.2 % — LOW (ref 43–77)
PLATELET # BLD AUTO: 137 K/UL — LOW (ref 150–400)
PLATELET # BLD AUTO: 137 K/UL — LOW (ref 150–400)
RBC # BLD: 3.94 M/UL — LOW (ref 4.2–5.8)
RBC # BLD: 3.94 M/UL — LOW (ref 4.2–5.8)
RBC # FLD: 12.2 % — SIGNIFICANT CHANGE UP (ref 10.3–14.5)
RBC # FLD: 12.2 % — SIGNIFICANT CHANGE UP (ref 10.3–14.5)
WBC # BLD: 3 K/UL — LOW (ref 3.8–10.5)
WBC # BLD: 3 K/UL — LOW (ref 3.8–10.5)
WBC # FLD AUTO: 3 K/UL — LOW (ref 3.8–10.5)
WBC # FLD AUTO: 3 K/UL — LOW (ref 3.8–10.5)

## 2023-12-06 PROCEDURE — 99215 OFFICE O/P EST HI 40 MIN: CPT

## 2023-12-07 LAB
ALBUMIN SERPL ELPH-MCNC: 4.7 G/DL
ALP BLD-CCNC: 48 U/L
ALT SERPL-CCNC: 11 U/L
ANION GAP SERPL CALC-SCNC: 14 MMOL/L
APTT BLD: 30.4 SEC
AST SERPL-CCNC: 13 U/L
BILIRUB SERPL-MCNC: 0.6 MG/DL
BUN SERPL-MCNC: 16 MG/DL
CALCIUM SERPL-MCNC: 10.1 MG/DL
CHLORIDE SERPL-SCNC: 101 MMOL/L
CO2 SERPL-SCNC: 22 MMOL/L
CREAT SERPL-MCNC: 1.11 MG/DL
EGFR: 71 ML/MIN/1.73M2
FERRITIN SERPL-MCNC: 128 NG/ML
FOLATE SERPL-MCNC: 6.8 NG/ML
GLUCOSE SERPL-MCNC: 173 MG/DL
INR PPP: 1.05 RATIO
IRON SATN MFR SERPL: 20 %
IRON SERPL-MCNC: 75 UG/DL
POTASSIUM SERPL-SCNC: 3.9 MMOL/L
PROT SERPL-MCNC: 7.6 G/DL
PT BLD: 11.9 SEC
RBC # BLD: 3.98 M/UL
RETICS # AUTO: 1.8 %
RETICS AGGREG/RBC NFR: 72.4 K/UL
SODIUM SERPL-SCNC: 136 MMOL/L
TIBC SERPL-MCNC: 378 UG/DL
UIBC SERPL-MCNC: 302 UG/DL
VIT B12 SERPL-MCNC: 389 PG/ML

## 2023-12-08 LAB
ALBUMIN MFR SERPL ELPH: 59.5 %
ALBUMIN SERPL-MCNC: 4.5 G/DL
ALBUMIN/GLOB SERPL: 1.5 RATIO
ALPHA1 GLOB MFR SERPL ELPH: 3 %
ALPHA1 GLOB SERPL ELPH-MCNC: 0.2 G/DL
ALPHA2 GLOB MFR SERPL ELPH: 9.9 %
ALPHA2 GLOB SERPL ELPH-MCNC: 0.8 G/DL
B-GLOBULIN MFR SERPL ELPH: 12.3 %
B-GLOBULIN SERPL ELPH-MCNC: 0.9 G/DL
DEPRECATED KAPPA LC FREE/LAMBDA SER: 2.09 RATIO
GAMMA GLOB FLD ELPH-MCNC: 1.2 G/DL
GAMMA GLOB MFR SERPL ELPH: 15.3 %
IGA SER QL IEP: 456 MG/DL
IGG SER QL IEP: 1156 MG/DL
IGM SER QL IEP: 101 MG/DL
INTERPRETATION SERPL IEP-IMP: NORMAL
KAPPA LC CSF-MCNC: 1.5 MG/DL
KAPPA LC SERPL-MCNC: 3.13 MG/DL
M PROTEIN SPEC IFE-MCNC: NORMAL
PROT SERPL-MCNC: 7.6 G/DL
PROT SERPL-MCNC: 7.6 G/DL
T(9;22)(ABL1,BCR)/CONTROL BLD/T: NORMAL

## 2023-12-15 ENCOUNTER — NON-APPOINTMENT (OUTPATIENT)
Age: 68
End: 2023-12-15

## 2023-12-15 DIAGNOSIS — D61.818 OTHER PANCYTOPENIA: ICD-10-CM

## 2023-12-15 LAB
GENE XXX MUT ANL BLD/T: NORMAL
JAK2 P.V617F BLD/T QL: NORMAL
MPL EXON 10 MUTATION: NORMAL
REFLEX:: NORMAL

## 2024-01-03 ENCOUNTER — LABORATORY RESULT (OUTPATIENT)
Age: 69
End: 2024-01-03

## 2024-01-03 ENCOUNTER — APPOINTMENT (OUTPATIENT)
Dept: DERMATOLOGY | Facility: CLINIC | Age: 69
End: 2024-01-03

## 2024-01-23 ENCOUNTER — APPOINTMENT (OUTPATIENT)
Dept: UROLOGY | Facility: CLINIC | Age: 69
End: 2024-01-23
Payer: COMMERCIAL

## 2024-01-23 VITALS
SYSTOLIC BLOOD PRESSURE: 133 MMHG | BODY MASS INDEX: 27.63 KG/M2 | OXYGEN SATURATION: 98 % | WEIGHT: 204 LBS | RESPIRATION RATE: 15 BRPM | HEART RATE: 70 BPM | HEIGHT: 72 IN | DIASTOLIC BLOOD PRESSURE: 79 MMHG

## 2024-01-23 DIAGNOSIS — N13.8 BENIGN PROSTATIC HYPERPLASIA WITH LOWER URINARY TRACT SYMPMS: ICD-10-CM

## 2024-01-23 DIAGNOSIS — Z87.442 PERSONAL HISTORY OF URINARY CALCULI: ICD-10-CM

## 2024-01-23 DIAGNOSIS — Z12.5 ENCOUNTER FOR SCREENING FOR MALIGNANT NEOPLASM OF PROSTATE: ICD-10-CM

## 2024-01-23 DIAGNOSIS — N40.1 BENIGN PROSTATIC HYPERPLASIA WITH LOWER URINARY TRACT SYMPMS: ICD-10-CM

## 2024-01-23 PROCEDURE — 99214 OFFICE O/P EST MOD 30 MIN: CPT

## 2024-01-23 NOTE — ASSESSMENT
[FreeTextEntry1] : 67 yo M with BPH with LUTS, well controlled wiht alpha blocker. Discussed OAB medications and WHEELER reducing procedures but pt declines for now.  For PSA screening, PSA is unremarkable and NICOLE benign. Annual screening. For history of kidney stones, RBUS was negative. No further treatment at this time.

## 2024-01-23 NOTE — HISTORY OF PRESENT ILLNESS
[FreeTextEntry1] : 67 year old man seen 07/25/2023 presents to [facility]. Patient reports he has history of kidney stones s/p ESWL x2, BPH with LUTS and 2 UTIs over the past year one due to an obstructing stones which he spontaneously passed. Patient reports he was following up with Dr. Ochoa until 6 months ago. He reports his PSAs have been normal. He reports urinary frequency and nocturia x2, he reports his symptoms have improved with Tamsulosin but could be better.  It is mild in severity. Nothing makes the symptoms better, nothing makes sx worse.  It is associated with nothing. No hematuria, no dysuria, no straining. No incontinence.  No fevers, no chills, no nausea, no vomiting, no flank pain.   PVR 43 mL  01/23/2024: Patient presents for follow up. He had tried increasing tamsulosin to 0.8 mg but he thought it worsened his urinary frequency. He reduced to 0.4 mg. He reports frequency and nocturia x2/night. Not bothered. No flank pain, no hematuria, no other acute LUTS.

## 2024-01-24 ENCOUNTER — RX RENEWAL (OUTPATIENT)
Age: 69
End: 2024-01-24

## 2024-01-24 ENCOUNTER — APPOINTMENT (OUTPATIENT)
Dept: ENDOCRINOLOGY | Facility: CLINIC | Age: 69
End: 2024-01-24
Payer: COMMERCIAL

## 2024-01-24 VITALS
DIASTOLIC BLOOD PRESSURE: 84 MMHG | SYSTOLIC BLOOD PRESSURE: 132 MMHG | OXYGEN SATURATION: 99 % | WEIGHT: 204 LBS | HEIGHT: 72 IN | HEART RATE: 69 BPM | BODY MASS INDEX: 27.63 KG/M2

## 2024-01-24 LAB — GLUCOSE BLDC GLUCOMTR-MCNC: 131

## 2024-01-24 PROCEDURE — 99214 OFFICE O/P EST MOD 30 MIN: CPT

## 2024-01-24 PROCEDURE — 82962 GLUCOSE BLOOD TEST: CPT

## 2024-01-24 RX ORDER — METFORMIN ER 500 MG 500 MG/1
500 TABLET ORAL
Qty: 180 | Refills: 2 | Status: ACTIVE | COMMUNITY
Start: 2018-12-17 | End: 1900-01-01

## 2024-01-24 NOTE — PHYSICAL EXAM
[Alert] : alert [Well Nourished] : well nourished [No Acute Distress] : no acute distress [Well Developed] : well developed [Normal Sclera/Conjunctiva] : normal sclera/conjunctiva [EOMI] : extra ocular movement intact [No Proptosis] : no proptosis [Thyroid Not Enlarged] : the thyroid was not enlarged [No Thyroid Nodules] : no palpable thyroid nodules [No Respiratory Distress] : no respiratory distress [No Accessory Muscle Use] : no accessory muscle use [Clear to Auscultation] : lungs were clear to auscultation bilaterally [Normal S1, S2] : normal S1 and S2 [Normal Rate] : heart rate was normal [Regular Rhythm] : with a regular rhythm [No Edema] : no peripheral edema [Normal Anterior Cervical Nodes] : no anterior cervical lymphadenopathy [No Tremors] : no tremors [Normal Affect] : the affect was normal [Oriented x3] : oriented to person, place, and time [Normal Mood] : the mood was normal [Acanthosis Nigricans] : no acanthosis nigricans

## 2024-01-24 NOTE — REVIEW OF SYSTEMS
[Pain/Numbness of Digits] : pain/numbness of digits [Recent Weight Gain (___ Lbs)] : no recent weight gain [Recent Weight Loss (___ Lbs)] : no recent weight loss [Chest Pain] : no chest pain [Palpitations] : no palpitations [Shortness Of Breath] : no shortness of breath [Nausea] : no nausea [Abdominal Pain] : no abdominal pain [Vomiting] : no vomiting [Polyuria] : no polyuria [Polydipsia] : no polydipsia

## 2024-01-24 NOTE — ASSESSMENT
[FreeTextEntry1] : 67 year old male with T2DM, hypothyroidism, hypertension, and hyperlipidemia. Glycemic control is acceptable.  1. T2DM- A1c 6.2%. -Continue Metformin and Ozempic. -Reviewed importance of lifestyle modifications- diet, exercise, and weight loss- to maintain glycemic control. -Reviewed balanced diet using plate method. -Continue SMBG. -Eye exam with ophthalmology due March 2024. -Repeat A1c and RTO for follow-up with NP in 3 months and MD in 6 months.  2. Hypothyroidism- unsure if he is taking LT4 112 mcg or 125 mcg, but biochemically euthryoid on replacement. -Continue current dose of LT4. Call office to verify dose. -Repeat TFTs in 3 months.  3. Hyperlipidemia- LDL acceptable. -Continue statin and fenofibrate. -Repeat lipids in 3 months.  4. Hypertension- controlled. -Continue current regimen.

## 2024-01-24 NOTE — HISTORY OF PRESENT ILLNESS
[FreeTextEntry1] : INTERVAL HISTORY: seeing hematology for pancytopenia, PATRICIO-2 positive. Going for bone marrow biopsy tomorrow.  Type: 2 Severity: moderate Duration: chronic  Associated symptoms- Last eye exam: 2023, jerry ACOSTA Last foot exam: mild numbness/tingling in B/L feet, stable Kidney disease: CKD 3, saw nephrology 3 months ago Heart disease: none  Modifying factors- Current meds for glycemic control: Metformin 1000 mg BID Ozempic 0.5 mg weekly--->held for 2 weeks in preparation for bone marrow biopsy.  Diet: Does not eat enough fruits and vegetables. Eats a lot of carbohydrates Weight: stable Exercise: 3-4x per week walks or goes to the gym  Rare SMBG, once weekly at vary times. Reports BG is 110s to 120s. Current B, ate oatmeal with rhubarb jam, milk, and coffee for breakfast 3 hours ago. ======================== Hypothyroidism Current regimen: LT4, unsure if dose is 112 mcg or 125 mcg daily, takes right before breakfast with all other medication in the morning.

## 2024-01-25 ENCOUNTER — RX RENEWAL (OUTPATIENT)
Age: 69
End: 2024-01-25

## 2024-01-25 ENCOUNTER — TRANSCRIPTION ENCOUNTER (OUTPATIENT)
Age: 69
End: 2024-01-25

## 2024-01-25 ENCOUNTER — RESULT REVIEW (OUTPATIENT)
Age: 69
End: 2024-01-25

## 2024-01-25 ENCOUNTER — OUTPATIENT (OUTPATIENT)
Dept: INPATIENT UNIT | Facility: HOSPITAL | Age: 69
LOS: 1 days | Discharge: HOME | End: 2024-01-25
Payer: COMMERCIAL

## 2024-01-25 VITALS
HEIGHT: 72 IN | RESPIRATION RATE: 14 BRPM | DIASTOLIC BLOOD PRESSURE: 75 MMHG | OXYGEN SATURATION: 99 % | TEMPERATURE: 97 F | HEART RATE: 60 BPM | WEIGHT: 205.03 LBS | SYSTOLIC BLOOD PRESSURE: 115 MMHG

## 2024-01-25 VITALS
RESPIRATION RATE: 16 BRPM | SYSTOLIC BLOOD PRESSURE: 104 MMHG | HEART RATE: 60 BPM | DIASTOLIC BLOOD PRESSURE: 64 MMHG | OXYGEN SATURATION: 100 %

## 2024-01-25 DIAGNOSIS — Z98.890 OTHER SPECIFIED POSTPROCEDURAL STATES: Chronic | ICD-10-CM

## 2024-01-25 DIAGNOSIS — D61.818 OTHER PANCYTOPENIA: ICD-10-CM

## 2024-01-25 LAB
INR BLD: 1.13 RATIO — SIGNIFICANT CHANGE UP (ref 0.85–1.18)
PROTHROM AB SERPL-ACNC: 12.7 SEC — SIGNIFICANT CHANGE UP (ref 9.5–13)

## 2024-01-25 PROCEDURE — 88313 SPECIAL STAINS GROUP 2: CPT | Mod: 26

## 2024-01-25 PROCEDURE — 88342 IMHCHEM/IMCYTCHM 1ST ANTB: CPT

## 2024-01-25 PROCEDURE — 88237 TISSUE CULTURE BONE MARROW: CPT

## 2024-01-25 PROCEDURE — 85610 PROTHROMBIN TIME: CPT

## 2024-01-25 PROCEDURE — 81451 HL NEO GSAP 5-50 RNA ALYS: CPT

## 2024-01-25 PROCEDURE — 88341 IMHCHEM/IMCYTCHM EA ADD ANTB: CPT | Mod: 26

## 2024-01-25 PROCEDURE — 88184 FLOWCYTOMETRY/ TC 1 MARKER: CPT

## 2024-01-25 PROCEDURE — 81270 JAK2 GENE: CPT

## 2024-01-25 PROCEDURE — 85097 BONE MARROW INTERPRETATION: CPT

## 2024-01-25 PROCEDURE — 88291 CYTO/MOLECULAR REPORT: CPT

## 2024-01-25 PROCEDURE — 88185 FLOWCYTOMETRY/TC ADD-ON: CPT

## 2024-01-25 PROCEDURE — 88305 TISSUE EXAM BY PATHOLOGIST: CPT | Mod: 26

## 2024-01-25 PROCEDURE — 38222 DX BONE MARROW BX & ASPIR: CPT | Mod: RT

## 2024-01-25 PROCEDURE — 77012 CT SCAN FOR NEEDLE BIOPSY: CPT | Mod: 26

## 2024-01-25 PROCEDURE — G0452: CPT | Mod: 26

## 2024-01-25 PROCEDURE — 88305 TISSUE EXAM BY PATHOLOGIST: CPT

## 2024-01-25 PROCEDURE — 88360 TUMOR IMMUNOHISTOCHEM/MANUAL: CPT

## 2024-01-25 PROCEDURE — 81450 HL NEO GSAP 5-50DNA/DNA&RNA: CPT

## 2024-01-25 PROCEDURE — 36415 COLL VENOUS BLD VENIPUNCTURE: CPT

## 2024-01-25 PROCEDURE — 88280 CHROMOSOME KARYOTYPE STUDY: CPT

## 2024-01-25 PROCEDURE — 88264 CHROMOSOME ANALYSIS 20-25: CPT

## 2024-01-25 PROCEDURE — 88313 SPECIAL STAINS GROUP 2: CPT

## 2024-01-25 PROCEDURE — 88189 FLOWCYTOMETRY/READ 16 & >: CPT

## 2024-01-25 PROCEDURE — 88342 IMHCHEM/IMCYTCHM 1ST ANTB: CPT | Mod: 26,59

## 2024-01-25 PROCEDURE — 87205 SMEAR GRAM STAIN: CPT

## 2024-01-25 PROCEDURE — 88341 IMHCHEM/IMCYTCHM EA ADD ANTB: CPT

## 2024-01-25 PROCEDURE — 77012 CT SCAN FOR NEEDLE BIOPSY: CPT

## 2024-01-25 RX ORDER — METFORMIN HYDROCHLORIDE 850 MG/1
1 TABLET ORAL
Qty: 0 | Refills: 0 | DISCHARGE

## 2024-01-25 RX ORDER — OXYCODONE HYDROCHLORIDE 5 MG/1
5 TABLET ORAL ONCE
Refills: 0 | Status: DISCONTINUED | OUTPATIENT
Start: 2024-01-25 | End: 2024-01-25

## 2024-01-25 RX ORDER — FENOFIBRATE,MICRONIZED 130 MG
1 CAPSULE ORAL
Qty: 0 | Refills: 0 | DISCHARGE

## 2024-01-25 RX ORDER — SODIUM CHLORIDE 9 MG/ML
1000 INJECTION, SOLUTION INTRAVENOUS
Refills: 0 | Status: DISCONTINUED | OUTPATIENT
Start: 2024-01-25 | End: 2024-01-25

## 2024-01-25 RX ORDER — HYDROMORPHONE HYDROCHLORIDE 2 MG/ML
0.5 INJECTION INTRAMUSCULAR; INTRAVENOUS; SUBCUTANEOUS
Refills: 0 | Status: DISCONTINUED | OUTPATIENT
Start: 2024-01-25 | End: 2024-01-25

## 2024-01-25 RX ORDER — ONDANSETRON 8 MG/1
4 TABLET, FILM COATED ORAL ONCE
Refills: 0 | Status: DISCONTINUED | OUTPATIENT
Start: 2024-01-25 | End: 2024-01-25

## 2024-01-25 RX ORDER — ATORVASTATIN CALCIUM 80 MG/1
1 TABLET, FILM COATED ORAL
Qty: 0 | Refills: 0 | DISCHARGE

## 2024-01-25 RX ORDER — SEMAGLUTIDE 0.68 MG/ML
0.5 INJECTION, SOLUTION SUBCUTANEOUS
Qty: 0 | Refills: 0 | DISCHARGE

## 2024-01-25 RX ORDER — ASPIRIN/CALCIUM CARB/MAGNESIUM 324 MG
1 TABLET ORAL
Qty: 0 | Refills: 0 | DISCHARGE

## 2024-01-25 NOTE — ASU PATIENT PROFILE, ADULT - FALL HARM RISK - UNIVERSAL INTERVENTIONS
Bed in lowest position, wheels locked, appropriate side rails in place/Call bell, personal items and telephone in reach/Instruct patient to call for assistance before getting out of bed or chair/Non-slip footwear when patient is out of bed/[place] to call system/Physically safe environment - no spills, clutter or unnecessary equipment/Purposeful Proactive Rounding/Room/bathroom lighting operational, light cord in reach

## 2024-01-26 LAB — JAK2 P.V617F BLD/T QL: SIGNIFICANT CHANGE UP

## 2024-01-30 DIAGNOSIS — D64.9 ANEMIA, UNSPECIFIED: ICD-10-CM

## 2024-01-30 LAB — TM INTERPRETATION: SIGNIFICANT CHANGE UP

## 2024-02-07 LAB — CHROM ANALY OVERALL INTERP SPEC-IMP: SIGNIFICANT CHANGE UP

## 2024-02-08 ENCOUNTER — NON-APPOINTMENT (OUTPATIENT)
Age: 69
End: 2024-02-08

## 2024-02-26 ENCOUNTER — OUTPATIENT (OUTPATIENT)
Dept: OUTPATIENT SERVICES | Facility: HOSPITAL | Age: 69
LOS: 1 days | Discharge: HOME | End: 2024-02-26

## 2024-02-26 DIAGNOSIS — Z98.890 OTHER SPECIFIED POSTPROCEDURAL STATES: Chronic | ICD-10-CM

## 2024-02-26 DIAGNOSIS — D64.9 ANEMIA, UNSPECIFIED: ICD-10-CM

## 2024-03-01 ENCOUNTER — APPOINTMENT (OUTPATIENT)
Dept: HEMATOLOGY ONCOLOGY | Facility: CLINIC | Age: 69
End: 2024-03-01
Payer: COMMERCIAL

## 2024-03-01 ENCOUNTER — RESULT REVIEW (OUTPATIENT)
Age: 69
End: 2024-03-01

## 2024-03-01 VITALS
SYSTOLIC BLOOD PRESSURE: 138 MMHG | WEIGHT: 204 LBS | HEIGHT: 74 IN | BODY MASS INDEX: 26.18 KG/M2 | OXYGEN SATURATION: 99 % | DIASTOLIC BLOOD PRESSURE: 74 MMHG | HEART RATE: 70 BPM | TEMPERATURE: 97.3 F

## 2024-03-01 DIAGNOSIS — D69.6 THROMBOCYTOPENIA, UNSPECIFIED: ICD-10-CM

## 2024-03-01 DIAGNOSIS — D64.9 ANEMIA, UNSPECIFIED: ICD-10-CM

## 2024-03-01 LAB
BASO STIPL BLD QL SMEAR: PRESENT — SIGNIFICANT CHANGE UP
BASOPHILS # BLD AUTO: 0.23 K/UL — HIGH (ref 0–0.2)
BASOPHILS NFR BLD AUTO: 6 % — HIGH (ref 0–2)
EOSINOPHIL # BLD AUTO: 0 K/UL — SIGNIFICANT CHANGE UP (ref 0–0.5)
EOSINOPHIL NFR BLD AUTO: 0 % — SIGNIFICANT CHANGE UP (ref 0–6)
GIANT PLATELETS BLD QL SMEAR: PRESENT — SIGNIFICANT CHANGE UP
HCT VFR BLD CALC: 38.6 % — LOW (ref 39–50)
HGB BLD-MCNC: 13 G/DL — SIGNIFICANT CHANGE UP (ref 13–17)
LG PLATELETS BLD QL AUTO: SIGNIFICANT CHANGE UP
LYMPHOCYTES # BLD AUTO: 1.27 K/UL — SIGNIFICANT CHANGE UP (ref 1–3.3)
LYMPHOCYTES # BLD AUTO: 33 % — SIGNIFICANT CHANGE UP (ref 13–44)
MCHC RBC-ENTMCNC: 30.5 PG — SIGNIFICANT CHANGE UP (ref 27–34)
MCHC RBC-ENTMCNC: 33.7 GM/DL — SIGNIFICANT CHANGE UP (ref 32–36)
MCV RBC AUTO: 90.6 FL — SIGNIFICANT CHANGE UP (ref 80–100)
MONOCYTES # BLD AUTO: 0.69 K/UL — SIGNIFICANT CHANGE UP (ref 0–0.9)
MONOCYTES NFR BLD AUTO: 18 % — HIGH (ref 2–14)
NEUTROPHILS # BLD AUTO: 1.27 K/UL — LOW (ref 1.8–7.4)
NEUTROPHILS NFR BLD AUTO: 33 % — LOW (ref 43–77)
NRBC # BLD: 0 /100 WBCS — SIGNIFICANT CHANGE UP (ref 0–0)
NRBC # BLD: SIGNIFICANT CHANGE UP /100 WBCS (ref 0–0)
PLAT MORPH BLD: NORMAL — SIGNIFICANT CHANGE UP
PLATELET # BLD AUTO: 146 K/UL — LOW (ref 150–400)
POIKILOCYTOSIS BLD QL AUTO: SLIGHT — SIGNIFICANT CHANGE UP
POLYCHROMASIA BLD QL SMEAR: SLIGHT — SIGNIFICANT CHANGE UP
RBC # BLD: 4.26 M/UL — SIGNIFICANT CHANGE UP (ref 4.2–5.8)
RBC # FLD: 13.9 % — SIGNIFICANT CHANGE UP (ref 10.3–14.5)
RBC BLD AUTO: SIGNIFICANT CHANGE UP
VARIANT LYMPHS # BLD: 10 % — HIGH (ref 0–6)
WBC # BLD: 3.84 K/UL — SIGNIFICANT CHANGE UP (ref 3.8–10.5)
WBC # FLD AUTO: 3.84 K/UL — SIGNIFICANT CHANGE UP (ref 3.8–10.5)

## 2024-03-01 PROCEDURE — 99215 OFFICE O/P EST HI 40 MIN: CPT

## 2024-03-05 LAB — EPO SERPL-MCNC: 9.5 MIU/ML — SIGNIFICANT CHANGE UP (ref 2.6–18.5)

## 2024-03-12 PROBLEM — D64.9 ANEMIA, UNSPECIFIED TYPE: Status: ACTIVE | Noted: 2020-07-23

## 2024-03-12 PROBLEM — D69.6 THROMBOCYTOPENIA: Status: ACTIVE | Noted: 2023-11-01

## 2024-03-12 NOTE — HISTORY OF PRESENT ILLNESS
[de-identified] : This is a 68 year old male who is here with his sister for newly diagnosed CMML.   He has past medical history of HLD, Hypothyroidism, T2DM, nephrolithiasis c/b UTIs who presented for an evaluation of anemia, neutropenia, lymphopenia, thrombocytopenia.  CBC 9/14/23 WBC 4.01 HGB 11.8 HCT 37.5          11/1/23 WBC 3.1 HGB 12.4 HCT 36.1          12/6/23 WBC 3.0 HGB 12.6 HCT 36.3   Denies fever, chills, night sweats, abdominal pain, GI//mucosal bleeding. Had intentional weight loss as is on Ozempic for T2DM. Had UTI 2/2 known kidney stones in June, not hospitalized. Previously treated with Nitrofurantoin, Bactrim. No new medications, travel hx or sick contacts.  Bone marrow biopsy 1/25/24- Chronic myelomonocytic leukemia-1 (CMML-1) The bone marrow shows hypercellularity with increased maturing granulopoiesis, increased monocytes, and increased megakaryopoiesis with dysplastic features. The monocytes display abnormal immunophenotype by flow cytometry as well as anbormal morphologic features.  NGS myeloid panel identified pathogenic mutations in  ASXL1, TET2 and SRSF2 .  Blasts are not increased.  Normal karyotype     [de-identified] : He has no complaints, feeling in his usual state of health.

## 2024-03-12 NOTE — ASSESSMENT
[FreeTextEntry1] : This is a 68 year old male with a history of hyperlipidemia, DM, nephrolithiasis c/b UTIs with a chronic mild pancytopenia for a number of years (>10 years) who is found to have CMML-1.     CBC 9/14/23 WBC 4.01 HGB 11.8 HCT 37.5 .         11/1/23 WBC 3.1 HGB 12.4 HCT 36.1          12/6/23 WBC 3.0 HGB 12.6 HCT 36.3   Explained to patient that he has an underlying CMML/MDS, no signs of MPN. Discussed that it is not curable without an allogenic bone marrow transplant.   His MMM/CPSS-mol score is 1 (ASXL1 mutation).  Intermediate-1 risk, OS 59-68 months.  Discussed that management is guided by the CMML risk category (ie, higher-risk versus lower-risk), the nature of symptoms, suitability for transplantation.   For asymptomatic patients who have no critical cytopenias, the general plan is for observation.    Allogeneic transplantation is the only potentially curative option for patients with CMML, explained that there is a high treatment related mortality so would not pursue it unless he develops higher risk disease.  Discussed obtaining a consultation but he would like to hold off.  If his counts change, will likely plan on a repeat marrow.  No need for HMA therapy at this time.   He will follow up in 2-3 months.

## 2024-03-13 DIAGNOSIS — D69.6 THROMBOCYTOPENIA, UNSPECIFIED: ICD-10-CM

## 2024-03-13 DIAGNOSIS — C93.10 CHRONIC MYELOMONOCYTIC LEUKEMIA NOT HAVING ACHIEVED REMISSION: ICD-10-CM

## 2024-04-09 RX ORDER — SEMAGLUTIDE 0.68 MG/ML
2 INJECTION, SOLUTION SUBCUTANEOUS
Qty: 9 | Refills: 2 | Status: ACTIVE | COMMUNITY
Start: 2022-03-17

## 2024-04-16 NOTE — CONSULT LETTER
Refill Routing Note   Medication(s) are not appropriate for processing by [facility] for the following reason(s):        Required labs outdated    [facility] action(s):  Defer               Appointments  past 12m or future 3m with PCP    Date Provider   Last Visit   3/12/2024 Nuzhat Kline MD   Next Visit   Visit date not found Nuzhat Kline MD   ED visits in past 90 days: 0        Note composed:10:44 AM 04/16/2024            [DrFarooq  ___] : Dr. DUMONT

## 2024-05-01 ENCOUNTER — OUTPATIENT (OUTPATIENT)
Dept: OUTPATIENT SERVICES | Facility: HOSPITAL | Age: 69
LOS: 1 days | Discharge: HOME | End: 2024-05-01

## 2024-05-01 DIAGNOSIS — Z98.890 OTHER SPECIFIED POSTPROCEDURAL STATES: Chronic | ICD-10-CM

## 2024-05-01 DIAGNOSIS — D64.9 ANEMIA, UNSPECIFIED: ICD-10-CM

## 2024-05-02 ENCOUNTER — RESULT REVIEW (OUTPATIENT)
Age: 69
End: 2024-05-02

## 2024-05-02 ENCOUNTER — APPOINTMENT (OUTPATIENT)
Dept: HEMATOLOGY ONCOLOGY | Facility: CLINIC | Age: 69
End: 2024-05-02
Payer: COMMERCIAL

## 2024-05-02 VITALS
TEMPERATURE: 97.9 F | HEIGHT: 74 IN | SYSTOLIC BLOOD PRESSURE: 150 MMHG | BODY MASS INDEX: 26.69 KG/M2 | HEART RATE: 70 BPM | WEIGHT: 208 LBS | DIASTOLIC BLOOD PRESSURE: 75 MMHG | OXYGEN SATURATION: 99 %

## 2024-05-02 LAB
BASOPHILS # BLD AUTO: 0.09 K/UL — SIGNIFICANT CHANGE UP (ref 0–0.2)
BASOPHILS NFR BLD AUTO: 2.7 % — HIGH (ref 0–2)
EOSINOPHIL # BLD AUTO: 0.04 K/UL — SIGNIFICANT CHANGE UP (ref 0–0.5)
EOSINOPHIL NFR BLD AUTO: 1.2 % — SIGNIFICANT CHANGE UP (ref 0–6)
HCT VFR BLD CALC: 35.6 % — LOW (ref 39–50)
HGB BLD-MCNC: 12.1 G/DL — LOW (ref 13–17)
IMM GRANULOCYTES NFR BLD AUTO: 0.3 % — SIGNIFICANT CHANGE UP (ref 0–0.9)
LYMPHOCYTES # BLD AUTO: 1.7 K/UL — SIGNIFICANT CHANGE UP (ref 1–3.3)
LYMPHOCYTES # BLD AUTO: 50.1 % — HIGH (ref 13–44)
MCHC RBC-ENTMCNC: 30.8 PG — SIGNIFICANT CHANGE UP (ref 27–34)
MCHC RBC-ENTMCNC: 34 GM/DL — SIGNIFICANT CHANGE UP (ref 32–36)
MCV RBC AUTO: 90.6 FL — SIGNIFICANT CHANGE UP (ref 80–100)
MONOCYTES # BLD AUTO: 0.48 K/UL — SIGNIFICANT CHANGE UP (ref 0–0.9)
MONOCYTES NFR BLD AUTO: 14.2 % — HIGH (ref 2–14)
NEUTROPHILS # BLD AUTO: 1.07 K/UL — LOW (ref 1.8–7.4)
NEUTROPHILS NFR BLD AUTO: 31.5 % — LOW (ref 43–77)
NRBC # BLD: 0 /100 WBCS — SIGNIFICANT CHANGE UP (ref 0–0)
PLATELET # BLD AUTO: 130 K/UL — LOW (ref 150–400)
RBC # BLD: 3.93 M/UL — LOW (ref 4.2–5.8)
RBC # FLD: 13.6 % — SIGNIFICANT CHANGE UP (ref 10.3–14.5)
WBC # BLD: 3.39 K/UL — LOW (ref 3.8–10.5)
WBC # FLD AUTO: 3.39 K/UL — LOW (ref 3.8–10.5)

## 2024-05-02 PROCEDURE — 99214 OFFICE O/P EST MOD 30 MIN: CPT

## 2024-05-03 LAB
ALBUMIN SERPL ELPH-MCNC: 4.9 G/DL — SIGNIFICANT CHANGE UP (ref 3.3–5)
ALP SERPL-CCNC: 48 U/L — SIGNIFICANT CHANGE UP (ref 40–120)
ALT FLD-CCNC: 18 U/L — SIGNIFICANT CHANGE UP (ref 10–45)
ANION GAP SERPL CALC-SCNC: 15 MMOL/L — SIGNIFICANT CHANGE UP (ref 5–17)
AST SERPL-CCNC: 18 U/L — SIGNIFICANT CHANGE UP (ref 10–40)
BILIRUB SERPL-MCNC: 0.8 MG/DL — SIGNIFICANT CHANGE UP (ref 0.2–1.2)
BUN SERPL-MCNC: 18 MG/DL — SIGNIFICANT CHANGE UP (ref 7–23)
CALCIUM SERPL-MCNC: 10.4 MG/DL — SIGNIFICANT CHANGE UP (ref 8.4–10.5)
CHLORIDE SERPL-SCNC: 100 MMOL/L — SIGNIFICANT CHANGE UP (ref 96–108)
CO2 SERPL-SCNC: 22 MMOL/L — SIGNIFICANT CHANGE UP (ref 22–31)
CREAT SERPL-MCNC: 1.3 MG/DL — SIGNIFICANT CHANGE UP (ref 0.5–1.3)
EGFR: 59 ML/MIN/1.73M2 — LOW
GLUCOSE SERPL-MCNC: 190 MG/DL — HIGH (ref 70–99)
LDH SERPL L TO P-CCNC: 154 U/L — SIGNIFICANT CHANGE UP (ref 50–242)
POTASSIUM SERPL-MCNC: 4.1 MMOL/L — SIGNIFICANT CHANGE UP (ref 3.5–5.3)
POTASSIUM SERPL-SCNC: 4.1 MMOL/L — SIGNIFICANT CHANGE UP (ref 3.5–5.3)
PROT SERPL-MCNC: 7.9 G/DL — SIGNIFICANT CHANGE UP (ref 6–8.3)
SODIUM SERPL-SCNC: 137 MMOL/L — SIGNIFICANT CHANGE UP (ref 135–145)
URATE SERPL-MCNC: 6.8 MG/DL — SIGNIFICANT CHANGE UP (ref 3.4–8.8)

## 2024-05-10 LAB
ALBUMIN SERPL ELPH-MCNC: 4.9 G/DL
ALP BLD-CCNC: 49 U/L
ALT SERPL-CCNC: 16 U/L
ANION GAP SERPL CALC-SCNC: 15 MMOL/L
AST SERPL-CCNC: 21 U/L
BILIRUB SERPL-MCNC: 0.8 MG/DL
BUN SERPL-MCNC: 20 MG/DL
CALCIUM SERPL-MCNC: 10 MG/DL
CHLORIDE SERPL-SCNC: 102 MMOL/L
CHOLEST SERPL-MCNC: 121 MG/DL
CO2 SERPL-SCNC: 19 MMOL/L
CREAT SERPL-MCNC: 1.26 MG/DL
CREAT SPEC-SCNC: 114 MG/DL
EGFR: 61 ML/MIN/1.73M2
ESTIMATED AVERAGE GLUCOSE: 134 MG/DL
GLUCOSE SERPL-MCNC: 115 MG/DL
HBA1C MFR BLD HPLC: 6.3 %
HDLC SERPL-MCNC: 30 MG/DL
LDLC SERPL CALC-MCNC: 63 MG/DL
MICROALBUMIN 24H UR DL<=1MG/L-MCNC: 4.2 MG/DL
MICROALBUMIN/CREAT 24H UR-RTO: 37 MG/G
NONHDLC SERPL-MCNC: 91 MG/DL
POTASSIUM SERPL-SCNC: 4.9 MMOL/L
PROT SERPL-MCNC: 7.8 G/DL
SODIUM SERPL-SCNC: 137 MMOL/L
T4 FREE SERPL-MCNC: 1.8 NG/DL
TRIGL SERPL-MCNC: 166 MG/DL
TSH SERPL-ACNC: 2.87 UIU/ML
VIT B12 SERPL-MCNC: 440 PG/ML

## 2024-05-12 NOTE — HISTORY OF PRESENT ILLNESS
[de-identified] : This is a 68 year old male who is here with his sister for newly diagnosed CMML.   He has past medical history of HLD, Hypothyroidism, T2DM, nephrolithiasis c/b UTIs who presented for an evaluation of anemia, neutropenia, lymphopenia, thrombocytopenia.  CBC 9/14/23 WBC 4.01 HGB 11.8 HCT 37.5          11/1/23 WBC 3.1 HGB 12.4 HCT 36.1          12/6/23 WBC 3.0 HGB 12.6 HCT 36.3   Denies fever, chills, night sweats, abdominal pain, GI//mucosal bleeding. Had intentional weight loss as is on Ozempic for T2DM. Had UTI 2/2 known kidney stones in June, not hospitalized. Previously treated with Nitrofurantoin, Bactrim. No new medications, travel hx or sick contacts.  Bone marrow biopsy 1/25/24- Chronic myelomonocytic leukemia-1 (CMML-1) The bone marrow shows hypercellularity with increased maturing granulopoiesis, increased monocytes, and increased megakaryopoiesis with dysplastic features. The monocytes display abnormal immunophenotype by flow cytometry as well as anbormal morphologic features.  NGS myeloid panel identified pathogenic mutations in  ASXL1, TET2 and SRSF2 .  Blasts are not increased.  Normal karyotype     [de-identified] : He is here with his wife.  No new complaints.  He feels well, in his usual state of health.  No fever/chills, no night sweats, no weight loss.

## 2024-05-12 NOTE — ASSESSMENT
[FreeTextEntry1] : This is a 68 year old male with a history of hyperlipidemia, DM, nephrolithiasis c/b UTIs with a chronic mild pancytopenia for a number of years (>10 years) who is found to have CMML-1.     CBC 9/14/23 WBC 4.01 HGB 11.8 HCT 37.5 .         11/1/23 WBC 3.1 HGB 12.4 HCT 36.1          12/6/23 WBC 3.0 HGB 12.6 HCT 36.3   Explained to patient that he has an underlying CMML/MDS, no signs of MPN. Discussed that it is not curable without an allogenic bone marrow transplant.   His MMM/CPSS-mol score is 1 (ASXL1 mutation).  Intermediate-1 risk, OS 59-68 months.  Discussed that management is guided by the CMML risk category (ie, higher-risk versus lower-risk), the nature of symptoms, suitability for transplantation.   For asymptomatic patients who have no critical cytopenias, the general plan is for observation.    Allogeneic transplantation is the only potentially curative option for patients with CMML, explained that there is a high treatment related mortality so would not pursue it unless he develops higher risk disease.  Discussed obtaining a consultation but he would like to hold off.  If his counts change, will likely plan on a repeat marrow.  No need for HMA therapy at this time.   His counts are stable today- WBC 3.39/ANC 1.07 Hg 12.1 Plt 130.  He is agreeable to transplant evaluation.  He will follow up in 2-3 months.

## 2024-05-13 DIAGNOSIS — C93.10 CHRONIC MYELOMONOCYTIC LEUKEMIA NOT HAVING ACHIEVED REMISSION: ICD-10-CM

## 2024-05-15 ENCOUNTER — APPOINTMENT (OUTPATIENT)
Dept: ENDOCRINOLOGY | Facility: CLINIC | Age: 69
End: 2024-05-15
Payer: COMMERCIAL

## 2024-05-15 VITALS
DIASTOLIC BLOOD PRESSURE: 82 MMHG | SYSTOLIC BLOOD PRESSURE: 128 MMHG | BODY MASS INDEX: 28.31 KG/M2 | WEIGHT: 209 LBS | HEIGHT: 72 IN | OXYGEN SATURATION: 98 % | HEART RATE: 65 BPM

## 2024-05-15 DIAGNOSIS — E05.10 THYROTOXICOSIS WITH TOXIC SINGLE THYROID NODULE W/OUT THYROTOXIC CRISIS OR STORM: ICD-10-CM

## 2024-05-15 DIAGNOSIS — E11.65 TYPE 2 DIABETES MELLITUS WITH HYPERGLYCEMIA: ICD-10-CM

## 2024-05-15 LAB — GLUCOSE BLDC GLUCOMTR-MCNC: 121

## 2024-05-15 PROCEDURE — G2211 COMPLEX E/M VISIT ADD ON: CPT

## 2024-05-15 PROCEDURE — 82962 GLUCOSE BLOOD TEST: CPT

## 2024-05-15 PROCEDURE — 99214 OFFICE O/P EST MOD 30 MIN: CPT

## 2024-05-15 RX ORDER — BLOOD SUGAR DIAGNOSTIC
STRIP MISCELLANEOUS
Qty: 100 | Refills: 1 | Status: ACTIVE | COMMUNITY
Start: 2024-05-15 | End: 1900-01-01

## 2024-05-15 RX ORDER — LANCETS
EACH MISCELLANEOUS
Qty: 100 | Refills: 1 | Status: ACTIVE | COMMUNITY
Start: 2024-05-15 | End: 1900-01-01

## 2024-05-15 NOTE — ASSESSMENT
[FreeTextEntry1] : 67 year old male with T2DM, hypothyroidism, hypertension, and hyperlipidemia. Glycemic control is acceptable.  1. T2DM- A1c 6.3%. -Continue Metformin and Ozempic. -Reviewed importance of lifestyle modifications- diet, exercise, and weight loss- to maintain glycemic control. -Resume SMBG a few times per week at varying time. -Eye exam with ophthalmology due!!! Mild МАРИНА but stable. -Repeat A1c and RTO for follow-up with MD in 3 months  2. Hypothyroidism- euthryoid on replacement T4. -Continue current dose of LT4.  -Repeat TFTs in 3 months.  3. Hyperlipidemia- LDL acceptable. -Continue statin and fenofibrate. -Repeat lipids in 3 months.  4. Hypertension- controlled. -Continue current regimen.  5. Vitamin B12 440 -Start vitamin B12 500 mcg daily.

## 2024-05-15 NOTE — REVIEW OF SYSTEMS
6 [Fatigue] : fatigue [Diarrhea] : diarrhea [Polyuria] : polyuria [Pain/Numbness of Digits] : pain/numbness of digits [Recent Weight Gain (___ Lbs)] : no recent weight gain [Recent Weight Loss (___ Lbs)] : no recent weight loss [Chest Pain] : no chest pain [Palpitations] : no palpitations [Shortness Of Breath] : no shortness of breath [Nausea] : no nausea [Constipation] : no constipation [Abdominal Pain] : no abdominal pain [Vomiting] : no vomiting [Tremors] : no tremors [Depression] : no depression [Anxiety] : no anxiety [Polydipsia] : no polydipsia

## 2024-05-15 NOTE — HISTORY OF PRESENT ILLNESS
[FreeTextEntry1] : INTERVAL HISTORY: pancytopenia, recently diagnosed with CMML. Monitoring for now.  Type: 2 Severity: moderate Duration: chronic  Associated symptoms- Last eye exam: 2023, jerry ACOSTA Last foot exam: intermittent mild numbness/tingling in B/L feet, stable Kidney disease: CKD 3, saw nephrology 6 months ago. МАРИНА 35 May 2024, stable. Heart disease: none  Modifying factors- Current meds for glycemic control: Metformin 1000 mg BID Ozempic 0.5 mg weekly  Diet: Does not eat enough fruits and vegetables. Eats a lot of carbohydrates Weight: stable Exercise: 3-4x per week walks or goes to the gym  No SMBG. Current B, fasting ======================== Hypothyroidism Current regimen: LT4 112 mcg daily, takes appropriately

## 2024-05-28 ENCOUNTER — OUTPATIENT (OUTPATIENT)
Dept: OUTPATIENT SERVICES | Facility: HOSPITAL | Age: 69
LOS: 1 days | Discharge: HOME | End: 2024-05-28

## 2024-05-28 ENCOUNTER — TRANSCRIPTION ENCOUNTER (OUTPATIENT)
Age: 69
End: 2024-05-28

## 2024-05-28 DIAGNOSIS — Z98.890 OTHER SPECIFIED POSTPROCEDURAL STATES: Chronic | ICD-10-CM

## 2024-05-28 DIAGNOSIS — C64.9 MALIGNANT NEOPLASM OF UNSPECIFIED KIDNEY, EXCEPT RENAL PELVIS: ICD-10-CM

## 2024-05-28 DIAGNOSIS — D64.9 ANEMIA, UNSPECIFIED: ICD-10-CM

## 2024-05-28 RX ORDER — LANCETS 33 GAUGE
EACH MISCELLANEOUS
Qty: 100 | Refills: 3 | Status: ACTIVE | COMMUNITY
Start: 2024-05-28 | End: 1900-01-01

## 2024-05-28 RX ORDER — BLOOD-GLUCOSE CONTROL, NORMAL
EACH MISCELLANEOUS
Qty: 1 | Refills: 0 | Status: ACTIVE | COMMUNITY
Start: 2024-05-28 | End: 1900-01-01

## 2024-05-28 RX ORDER — BLOOD-GLUCOSE METER
W/DEVICE EACH MISCELLANEOUS
Qty: 1 | Refills: 0 | Status: ACTIVE | COMMUNITY
Start: 2024-05-28 | End: 1900-01-01

## 2024-05-28 RX ORDER — BLOOD SUGAR DIAGNOSTIC
STRIP MISCELLANEOUS
Qty: 100 | Refills: 3 | Status: ACTIVE | COMMUNITY
Start: 2024-05-28 | End: 1900-01-01

## 2024-05-30 ENCOUNTER — APPOINTMENT (OUTPATIENT)
Dept: HEMATOLOGY ONCOLOGY | Facility: CLINIC | Age: 69
End: 2024-05-30
Payer: COMMERCIAL

## 2024-05-30 ENCOUNTER — RESULT REVIEW (OUTPATIENT)
Age: 69
End: 2024-05-30

## 2024-05-30 VITALS
BODY MASS INDEX: 28.17 KG/M2 | HEART RATE: 56 BPM | SYSTOLIC BLOOD PRESSURE: 124 MMHG | OXYGEN SATURATION: 99 % | DIASTOLIC BLOOD PRESSURE: 75 MMHG | TEMPERATURE: 97.8 F | HEIGHT: 71.46 IN | RESPIRATION RATE: 16 BRPM | WEIGHT: 205.69 LBS

## 2024-05-30 DIAGNOSIS — I10 ESSENTIAL (PRIMARY) HYPERTENSION: ICD-10-CM

## 2024-05-30 DIAGNOSIS — Z80.9 FAMILY HISTORY OF MALIGNANT NEOPLASM, UNSPECIFIED: ICD-10-CM

## 2024-05-30 DIAGNOSIS — E78.5 HYPERLIPIDEMIA, UNSPECIFIED: ICD-10-CM

## 2024-05-30 DIAGNOSIS — D55.0 ANEMIA DUE TO GLUCOSE-6-PHOSPHATE DEHYDROGENASE [G6PD] DEFICIENCY: ICD-10-CM

## 2024-05-30 DIAGNOSIS — E03.9 HYPOTHYROIDISM, UNSPECIFIED: ICD-10-CM

## 2024-05-30 DIAGNOSIS — Z78.9 OTHER SPECIFIED HEALTH STATUS: ICD-10-CM

## 2024-05-30 DIAGNOSIS — Z83.3 FAMILY HISTORY OF DIABETES MELLITUS: ICD-10-CM

## 2024-05-30 DIAGNOSIS — D22.5 MELANOCYTIC NEVI OF TRUNK: ICD-10-CM

## 2024-05-30 LAB
BASOPHILS # BLD AUTO: 0.1 K/UL — SIGNIFICANT CHANGE UP (ref 0–0.2)
BASOPHILS NFR BLD AUTO: 2.7 % — HIGH (ref 0–2)
EOSINOPHIL # BLD AUTO: 0.02 K/UL — SIGNIFICANT CHANGE UP (ref 0–0.5)
EOSINOPHIL NFR BLD AUTO: 0.5 % — SIGNIFICANT CHANGE UP (ref 0–6)
HCT VFR BLD CALC: 36.2 % — LOW (ref 39–50)
HGB BLD-MCNC: 12.4 G/DL — LOW (ref 13–17)
IMM GRANULOCYTES NFR BLD AUTO: 1.3 % — HIGH (ref 0–0.9)
LYMPHOCYTES # BLD AUTO: 1.6 K/UL — SIGNIFICANT CHANGE UP (ref 1–3.3)
LYMPHOCYTES # BLD AUTO: 42.9 % — SIGNIFICANT CHANGE UP (ref 13–44)
MCHC RBC-ENTMCNC: 30.8 PG — SIGNIFICANT CHANGE UP (ref 27–34)
MCHC RBC-ENTMCNC: 34.3 G/DL — SIGNIFICANT CHANGE UP (ref 32–36)
MCV RBC AUTO: 89.8 FL — SIGNIFICANT CHANGE UP (ref 80–100)
MONOCYTES # BLD AUTO: 0.5 K/UL — SIGNIFICANT CHANGE UP (ref 0–0.9)
MONOCYTES NFR BLD AUTO: 13.4 % — SIGNIFICANT CHANGE UP (ref 2–14)
NEUTROPHILS # BLD AUTO: 1.46 K/UL — LOW (ref 1.8–7.4)
NEUTROPHILS NFR BLD AUTO: 39.2 % — LOW (ref 43–77)
NRBC # BLD: 0 /100 WBCS — SIGNIFICANT CHANGE UP (ref 0–0)
PLATELET # BLD AUTO: 130 K/UL — LOW (ref 150–400)
RBC # BLD: 4.03 M/UL — LOW (ref 4.2–5.8)
RBC # FLD: 13.7 % — SIGNIFICANT CHANGE UP (ref 10.3–14.5)
WBC # BLD: 3.73 K/UL — LOW (ref 3.8–10.5)
WBC # FLD AUTO: 3.73 K/UL — LOW (ref 3.8–10.5)

## 2024-05-30 PROCEDURE — 99205 OFFICE O/P NEW HI 60 MIN: CPT

## 2024-05-31 PROBLEM — D55.0 GLUCOSE-6-PHOSPHATE DEHYDROGENASE (G6PD) DEFICIENCY ANEMIA: Status: ACTIVE | Noted: 2020-07-30

## 2024-05-31 PROBLEM — Z78.9 RARELY CONSUMES ALCOHOL: Status: ACTIVE | Noted: 2019-02-06

## 2024-05-31 PROBLEM — Z80.9 FAMILY HISTORY OF MALIGNANT NEOPLASM: Status: ACTIVE | Noted: 2019-02-06

## 2024-05-31 PROBLEM — E03.9 HYPOTHYROIDISM, UNSPECIFIED TYPE: Status: ACTIVE | Noted: 2019-02-06

## 2024-05-31 PROBLEM — I10 ESSENTIAL (PRIMARY) HYPERTENSION: Status: ACTIVE | Noted: 2018-10-18

## 2024-05-31 PROBLEM — E78.5 HYPERLIPIDEMIA, UNSPECIFIED: Status: ACTIVE | Noted: 2018-10-18

## 2024-05-31 PROBLEM — D22.5 MELANOCYTIC NEVUS OF TRUNK: Status: ACTIVE | Noted: 2018-06-27

## 2024-05-31 PROBLEM — Z83.3 FAMILY HISTORY OF DIABETES MELLITUS: Status: ACTIVE | Noted: 2019-02-06

## 2024-05-31 NOTE — HISTORY OF PRESENT ILLNESS
[de-identified] : This is a 68 year old male who is here with his wife for  stem cell evaluation for newly diagnosed CMML. intermediate risk  He has past medical history of HLD, Hypothyroidism, T2DM, nephrolithiasis c/b UTIs who presented for an evaluation of anemia, neutropenia, lymphopenia, thrombocytopenia.  CBC 9/14/23 WBC 4.01 HGB 11.8 HCT 37.5          11/1/23 WBC 3.1 HGB 12.4 HCT 36.1          12/6/23 WBC 3.0 HGB 12.6 HCT 36.3   Denies fever, chills, night sweats, abdominal pain, GI//mucosal bleeding. Had intentional weight loss as is on Ozempic for T2DM. Had UTI 2/2 known kidney stones in June, not hospitalized. Previously treated with Nitrofurantoin, Bactrim. No new medications, travel hx or sick contacts.  Bone marrow biopsy 1/25/24- Chronic myelomonocytic leukemia-1 (CMML-1) The bone marrow shows hypercellularity with increased maturing granulopoiesis, increased monocytes, and increased megakaryopoiesis with dysplastic features. The monocytes display abnormal immunophenotype by flow cytometry as well as anbormal morphologic features.  NGS myeloid panel identified pathogenic mutations in  ASXL1, TET2 and SRSF2 .  Blasts are not increased.  Normal karyotype [de-identified] : 5/30/24: Patient is here for an initial visit with his wife to discuss an allogeneic stem cell transplant.

## 2024-05-31 NOTE — ADDENDUM
[FreeTextEntry1] :  Documented by Cynthia Smiley acting as a scribe for Dr. Nita Ly on 5/30/24. All medical record entries made by the Scribe were at my, Dr. Nita Ly, direction and personally dictated by me on 5/30/24. I have reviewed the chart and agree that the record accurately reflects my personal performance of the history, physical exam, assessment and plan. I have also personally directed, reviewed, and agree with the discharge instructions.

## 2024-05-31 NOTE — ASSESSMENT
[FreeTextEntry1] : This is a 68 year old male with a history of hyperlipidemia, DM, nephrolithiasis c/b UTIs with a chronic mild pancytopenia for a number of years (>10 years) who is found to have CMML-1.     -CMML/MDS, no signs of MPN. -His MMM/CPSS-mol score is 1 (ASXL1 mutation).  Intermediate-1 risk, OS 59-68 months. -For asymptomatic patients who have no critical cytopenias, the general plan is for observation.   -I agree with Dr Nguyen's assessment that it is not necessary to pursue allo transplant unless he develops higher risk disease given the patient's age...this may not be the same thought process for a younger pt...I would follow closely for clinical changes that might indicate acceleration.  -No need for HMA therapy at this time.  -CBC stable -Explained that CMML can transform to AML which is more difficult to treat  -  I had an extensive discussion with the patient and his wife regarding the risks, benefits, alternatives, logistics and rationale for allogeneic stem cell transplant. Donor assessment, selection, clearance and mobilization with zarxio discussed. 4 week hospital stay and 6 to 12 month recovery discussed. Chemotherapy with fludarabine and cytoxan, as well as radiation discussed. Restrictions pre, lindsay and post discussed. Prep and gvhd prophylaxis depending on donor reviewed. Will proceed with unrelated donor search and typing of son and daughter (37 and 31), sister and brother (68 and 72). Son has a hematoma on spleen with some sort of anatomic deviation, daughter was born with hypothyroidism. Literature and consents provided; quests addressed. Pre transplant testing and orientation discussed, as well as donor selection and clearance. -Explained that the allogeneic stem cell transplant is the only curative option -Recommend close monitoring for the next 1-2 years and moving forward with allogeneic stem cell transplant if signs of acceleration -All questions and concerns addressed -Follow up with Dr. Aliyah fong

## 2024-06-03 ENCOUNTER — APPOINTMENT (OUTPATIENT)
Dept: HEMATOLOGY ONCOLOGY | Facility: CLINIC | Age: 69
End: 2024-06-03
Payer: COMMERCIAL

## 2024-06-03 VITALS
HEIGHT: 71.46 IN | HEART RATE: 66 BPM | SYSTOLIC BLOOD PRESSURE: 123 MMHG | DIASTOLIC BLOOD PRESSURE: 68 MMHG | BODY MASS INDEX: 28.35 KG/M2 | OXYGEN SATURATION: 97 % | WEIGHT: 207.01 LBS

## 2024-06-03 DIAGNOSIS — C93.10 CHRONIC MYELOMONOCYTIC LEUKEMIA NOT HAVING ACHIEVED REMISSION: ICD-10-CM

## 2024-06-03 PROCEDURE — 99214 OFFICE O/P EST MOD 30 MIN: CPT

## 2024-06-03 NOTE — HISTORY OF PRESENT ILLNESS
[de-identified] : Patient is a 68 M with PMHx HLD, Hypothyroidism, T2DM, nephrolithiasis c/b UTIs presents for follow-up for anemia, neutropenia, lymphopenia, thrombocytopenia.  CBC 9/14/23 WBC 4.01 HGB 11.8 HCT 37.5 .  11/1/23 WBC 3.1 HGB 12.4 HCT 36.1   12/6/23 WBC 3.0 HGB 12.6 HCT 36.3   1/3/24 WBC 5.20 HGB 12.7 HCT 40 PLT 17  03/01/24 WBC 3.84 HGB 13.0 HCT 38.6   05/02/24 WBC 3.39 HGB 12.1 HCT 35.6   05/30/24 WBC 3.73 HGB 12.4 HCT 36.2   Denies fever, chills, night sweats, abdominal pain, GI//mucosal bleeding. Had intentional weight loss as is on Ozempic for T2DM. Had UTI 2/2 known kidney stones in June, not hospitalized. Previously treated with Nitrofurantoin, Bactrim. No new medications, travel hx or sick contacts.  Interval History:  Bone marrow biopsy 1/25/24- Chronic myelomonocytic leukemia-1 (CMML-1) The bone marrow shows hypercellularity with increased maturing granulopoiesis, increased monocytes, and increased megakaryopoiesis with dysplastic features. The monocytes display abnormal immunophenotype by flow cytometry as well as anbormal morphologic features. NGS myeloid panel identified pathogenic mutations in ASXL1, TET2 and SRSF2. Blasts are not increased. Normal karyotype.  Evaluated for possible allogeneic stem cell transplant, will consider BMT if any signs of acceleration. Close monitoring for now. Otherwise no complaints, feels well.

## 2024-06-03 NOTE — ASSESSMENT
[FreeTextEntry1] : This is a 68 year old male with a history of hyperlipidemia, DM, nephrolithiasis c/b UTIs with a chronic mild pancytopenia for a number of years (>10 years) who is found to have CMML-1.  CBC 9/14/23 WBC 4.01 HGB 11.8 HCT 37.5 .          11/1/23 WBC 3.1 HGB 12.4 HCT 36.1           12/6/23 WBC 3.0 HGB 12.6 HCT 36.3             1/3/24 WBC 5.20 HGB 12.7 HCT 40          03/01/24 WBC 3.84 HGB 13.0 HCT 38.6           05/02/24 WBC 3.39 HGB 12.1 HCT 35.6           05/30/24 WBC 3.73 HGB 12.4 HCT 36.2   Labs and notes from other providers reviewed.  Explained to patient that he has an underlying CMML/MDS, no signs of MPN. Discussed that it is not curable without an allogenic bone marrow transplant.  His MMM/CPSS-mol score is 1 (ASXL1 mutation). Intermediate-1 risk, OS 59-68 months.  Discussed that management is guided by the CMML risk category (ie, higher-risk versus lower-risk), the nature of symptoms, suitability for transplantation. For asymptomatic patients who have no critical cytopenias, the general plan is for observation. Patient to continue close monitoring with Dr. Nguyen. Evaluated for allogenic transplantation, will consider if any rapid acceleration in disease.

## 2024-06-04 ENCOUNTER — TRANSCRIPTION ENCOUNTER (OUTPATIENT)
Age: 69
End: 2024-06-04

## 2024-06-10 ENCOUNTER — NON-APPOINTMENT (OUTPATIENT)
Age: 69
End: 2024-06-10

## 2024-06-11 ENCOUNTER — APPOINTMENT (OUTPATIENT)
Dept: OTOLARYNGOLOGY | Facility: CLINIC | Age: 69
End: 2024-06-11
Payer: COMMERCIAL

## 2024-06-11 VITALS — HEIGHT: 71.5 IN | BODY MASS INDEX: 28.35 KG/M2 | WEIGHT: 207 LBS

## 2024-06-11 DIAGNOSIS — R04.0 EPISTAXIS: ICD-10-CM

## 2024-06-11 DIAGNOSIS — H61.23 IMPACTED CERUMEN, BILATERAL: ICD-10-CM

## 2024-06-11 DIAGNOSIS — H90.3 SENSORINEURAL HEARING LOSS, BILATERAL: ICD-10-CM

## 2024-06-11 PROCEDURE — 92557 COMPREHENSIVE HEARING TEST: CPT

## 2024-06-11 PROCEDURE — 99214 OFFICE O/P EST MOD 30 MIN: CPT | Mod: 25

## 2024-06-11 PROCEDURE — 92567 TYMPANOMETRY: CPT

## 2024-06-11 PROCEDURE — G0268 REMOVAL OF IMPACTED WAX MD: CPT

## 2024-06-11 NOTE — REVIEW OF SYSTEMS
[Hearing Loss] : hearing loss [Negative] : Heme/Lymph [de-identified] : nosebleeds more frequent within the month

## 2024-06-11 NOTE — ASSESSMENT
[FreeTextEntry1] : Patient with problem with left epistaxis -  has lesion on left nasal septum - anteriorly -  approx 1.5 cm - raised and friable - bleeds to touch.  ? hemangioma or pyogenic granuloma - not biopsied as patient is on ASA - recommended eval with Dr Omalley - rhinology  Also c/o hearing loss - has bilat impacted cerumen.  After removal ears clear  - audio shows bilat snhl and recommended HAE.  Recommend follow up for cerumen in 6 mos.

## 2024-06-11 NOTE — PHYSICAL EXAM
[Midline] : trachea located in midline position [Normal] : no rashes [de-identified] : bilat impacted cerumen  [de-identified] : after cerumen removal  [de-identified] : granulationj tissue or lesion on left anterior septum - bleeds to touch

## 2024-06-11 NOTE — DATA REVIEWED
[de-identified] :  Type A tymps, AU. Testing via inserts: WNL up to 1kHz, sloping to mild, sharply sloping to moderately-severe / moderate SNHL, AU. Recs: 1) F/u w/ MD 2) Hearing aids pending med clearance 3) Annual

## 2024-06-11 NOTE — REASON FOR VISIT
[Subsequent Evaluation] : a subsequent evaluation for [FreeTextEntry2] : nosebleed left nostril / hearing evaluation

## 2024-06-11 NOTE — HISTORY OF PRESENT ILLNESS
[de-identified] : c/o nosebleed left nostril for past month.   Almost daily - no anticoag -does take baby asa daily  Also c/o problems hearing - problme for several years.   Feels heariing loss increasing.

## 2024-06-14 ENCOUNTER — APPOINTMENT (OUTPATIENT)
Dept: OTOLARYNGOLOGY | Facility: CLINIC | Age: 69
End: 2024-06-14
Payer: COMMERCIAL

## 2024-06-14 VITALS — WEIGHT: 207 LBS | BODY MASS INDEX: 28.35 KG/M2 | HEIGHT: 71.5 IN

## 2024-06-14 PROCEDURE — 99213 OFFICE O/P EST LOW 20 MIN: CPT | Mod: 25

## 2024-06-14 PROCEDURE — 31231 NASAL ENDOSCOPY DX: CPT

## 2024-06-14 NOTE — HISTORY OF PRESENT ILLNESS
[de-identified] : 68 year old male previously seen by Dr Darling presents for evaluation of growth wtihin left nostril. Patient states he has had bleeding from left nostril for last few months. States blood would drip out his nose and onto the floor randomly a few times a day. States does get mild weeping of blood on and off as well. Denies difficulty breathing or stuffy nose. Denies pain. Denies fevers or recent weight loss. Was diagnosed with CMML lately.

## 2024-06-14 NOTE — CONSULT LETTER
[FreeTextEntry1] : Dear Dr. RASHEED GALLEGOS I had the pleasure of evaluating your patient BOBBY MORRIS, thank you for allowing us to participate in their care. please see full note detailing our visit below. If you have any questions, please do not hesitate to call me and I would be happy to discuss further.   Jt Omalley M.D. Attending Physician,   Department of Otolaryngology - Head and Neck Surgery [facility]  Office: [phone] Fax: [phone]

## 2024-06-14 NOTE — END OF VISIT
[FreeTextEntry3] : I personally saw and examined the patient in detail. I spoke to DESMOND Wilson regarding the assessment and plan of care.  I preformed the procedures and I reviewed the above assessment and plan of care, and agree. I have made changes in changes in the body of the note where appropriate.

## 2024-06-14 NOTE — PROCEDURE
[FreeTextEntry6] : Procedure performed: Nasal Endoscopy- Diagnostic Pre-op indication(s): nasal congestion Post-op indication(s): nasal congestion  Verbal and/or written consent obtained from patient Anterior rhinoscopy insufficient to account for symptoms Scope #: 3,  flexible fiber optic telescope  The scope was introduced in the nasal passage between the middle and inferior turbinates to exam the inferior portion of the middle meatus and the fontanelle, as well as the maxillary ostia.  Next, the scope was passed medically and posteriorly to the middle turbinates to examine the sphenoethmoid recess and the superior turbinate region. Upon visualization the finders are as follows: Nasal Septum: right septal deviation Bilateral - Mucosa: boggy turbinates, Mucous: scant, Polyp: not seen, Inferior Turbinate: boggy, Middle Turbinate: normal, Superior Turbinate: normal, Inferior Meatus: narrow, Middle Meatus: narrow, Super Meatus:normal, Sphenoethmoidal Recess: clear

## 2024-06-14 NOTE — REASON FOR VISIT
[Subsequent Evaluation] : a subsequent evaluation for [FreeTextEntry2] : growth on left nostril ref by Dr Darling

## 2024-06-14 NOTE — ASSESSMENT
[FreeTextEntry1] : 68 year old male with CMML referred by Dr. Darling presents for evaluation of growth within left nostril. On exam, R DNS, lesion along anterior septum, raised cecial about 1 cm.   - discussed removing granuloma/lesion/mass and taking biopsy for further evaluation, patient elected and will follow up in 1 week  - advised pt to be off the aspirin 1 week before the procedure, will discuss with pcp  currently not on tx for the CMML, under observation and routine blood work, discussed may or may not be related

## 2024-06-27 ENCOUNTER — APPOINTMENT (OUTPATIENT)
Dept: OTOLARYNGOLOGY | Facility: CLINIC | Age: 69
End: 2024-06-27

## 2024-06-27 ENCOUNTER — APPOINTMENT (OUTPATIENT)
Dept: OTOLARYNGOLOGY | Facility: CLINIC | Age: 69
End: 2024-06-27
Payer: COMMERCIAL

## 2024-06-27 VITALS
TEMPERATURE: 97.9 F | HEART RATE: 61 BPM | SYSTOLIC BLOOD PRESSURE: 139 MMHG | BODY MASS INDEX: 28.35 KG/M2 | WEIGHT: 207 LBS | DIASTOLIC BLOOD PRESSURE: 74 MMHG | HEIGHT: 71.5 IN

## 2024-06-27 PROCEDURE — 99213 OFFICE O/P EST LOW 20 MIN: CPT | Mod: 25

## 2024-06-27 PROCEDURE — 31237 NSL/SINS NDSC SURG BX POLYPC: CPT | Mod: LT

## 2024-07-01 ENCOUNTER — OFFICE (OUTPATIENT)
Dept: URBAN - METROPOLITAN AREA CLINIC 102 | Facility: CLINIC | Age: 69
Setting detail: OPHTHALMOLOGY
End: 2024-07-01
Payer: COMMERCIAL

## 2024-07-01 DIAGNOSIS — H52.4: ICD-10-CM

## 2024-07-01 DIAGNOSIS — E11.9: ICD-10-CM

## 2024-07-01 DIAGNOSIS — H25.13: ICD-10-CM

## 2024-07-01 DIAGNOSIS — H35.372: ICD-10-CM

## 2024-07-01 DIAGNOSIS — H43.22: ICD-10-CM

## 2024-07-01 PROBLEM — H52.7 REFRACTIVE ERROR: Status: ACTIVE | Noted: 2024-07-01

## 2024-07-01 PROCEDURE — 92004 COMPRE OPH EXAM NEW PT 1/>: CPT | Performed by: OPHTHALMOLOGY

## 2024-07-01 PROCEDURE — 92015 DETERMINE REFRACTIVE STATE: CPT | Performed by: OPHTHALMOLOGY

## 2024-07-01 PROCEDURE — 92134 CPTRZ OPH DX IMG PST SGM RTA: CPT | Performed by: OPHTHALMOLOGY

## 2024-07-01 PROCEDURE — 92020 GONIOSCOPY: CPT | Performed by: OPHTHALMOLOGY

## 2024-07-01 ASSESSMENT — CONFRONTATIONAL VISUAL FIELD TEST (CVF)
OD_FINDINGS: FULL
OS_FINDINGS: FULL

## 2024-07-02 ENCOUNTER — APPOINTMENT (OUTPATIENT)
Dept: OTOLARYNGOLOGY | Facility: CLINIC | Age: 69
End: 2024-07-02
Payer: COMMERCIAL

## 2024-07-02 ENCOUNTER — RX RENEWAL (OUTPATIENT)
Age: 69
End: 2024-07-02

## 2024-07-02 DIAGNOSIS — J34.89 OTHER SPECIFIED DISORDERS OF NOSE AND NASAL SINUSES: ICD-10-CM

## 2024-07-02 DIAGNOSIS — L98.0 PYOGENIC GRANULOMA: ICD-10-CM

## 2024-07-02 DIAGNOSIS — J34.2 DEVIATED NASAL SEPTUM: ICD-10-CM

## 2024-07-02 LAB — CORE LAB BIOPSY: NORMAL

## 2024-07-02 PROCEDURE — 31237 NSL/SINS NDSC SURG BX POLYPC: CPT | Mod: LT

## 2024-07-02 PROCEDURE — 99213 OFFICE O/P EST LOW 20 MIN: CPT | Mod: 25

## 2024-07-03 ENCOUNTER — OUTPATIENT (OUTPATIENT)
Dept: OUTPATIENT SERVICES | Facility: HOSPITAL | Age: 69
LOS: 1 days | Discharge: HOME | End: 2024-07-03

## 2024-07-03 DIAGNOSIS — D64.9 ANEMIA, UNSPECIFIED: ICD-10-CM

## 2024-07-03 DIAGNOSIS — Z98.890 OTHER SPECIFIED POSTPROCEDURAL STATES: Chronic | ICD-10-CM

## 2024-07-05 ENCOUNTER — RESULT REVIEW (OUTPATIENT)
Age: 69
End: 2024-07-05

## 2024-07-05 ENCOUNTER — APPOINTMENT (OUTPATIENT)
Dept: HEMATOLOGY ONCOLOGY | Facility: CLINIC | Age: 69
End: 2024-07-05
Payer: COMMERCIAL

## 2024-07-05 VITALS
DIASTOLIC BLOOD PRESSURE: 66 MMHG | SYSTOLIC BLOOD PRESSURE: 110 MMHG | WEIGHT: 209 LBS | HEIGHT: 71.5 IN | OXYGEN SATURATION: 97 % | BODY MASS INDEX: 28.62 KG/M2 | TEMPERATURE: 98 F | HEART RATE: 66 BPM

## 2024-07-05 DIAGNOSIS — C93.10 CHRONIC MYELOMONOCYTIC LEUKEMIA NOT HAVING ACHIEVED REMISSION: ICD-10-CM

## 2024-07-05 DIAGNOSIS — D64.9 ANEMIA, UNSPECIFIED: ICD-10-CM

## 2024-07-05 LAB
ALBUMIN SERPL ELPH-MCNC: 4.6 G/DL — SIGNIFICANT CHANGE UP (ref 3.3–5)
ALP SERPL-CCNC: 47 U/L — SIGNIFICANT CHANGE UP (ref 40–120)
ALT FLD-CCNC: 16 U/L — SIGNIFICANT CHANGE UP (ref 10–45)
ANION GAP SERPL CALC-SCNC: 13 MMOL/L — SIGNIFICANT CHANGE UP (ref 5–17)
AST SERPL-CCNC: 18 U/L — SIGNIFICANT CHANGE UP (ref 10–40)
BASOPHILS # BLD AUTO: 0.1 K/UL — SIGNIFICANT CHANGE UP (ref 0–0.2)
BASOPHILS NFR BLD AUTO: 1.9 % — SIGNIFICANT CHANGE UP (ref 0–2)
BILIRUB SERPL-MCNC: 0.5 MG/DL — SIGNIFICANT CHANGE UP (ref 0.2–1.2)
BUN SERPL-MCNC: 19 MG/DL — SIGNIFICANT CHANGE UP (ref 7–23)
CALCIUM SERPL-MCNC: 9.9 MG/DL — SIGNIFICANT CHANGE UP (ref 8.4–10.5)
CHLORIDE SERPL-SCNC: 103 MMOL/L — SIGNIFICANT CHANGE UP (ref 96–108)
CO2 SERPL-SCNC: 21 MMOL/L — LOW (ref 22–31)
CREAT SERPL-MCNC: 1.29 MG/DL — SIGNIFICANT CHANGE UP (ref 0.5–1.3)
EGFR: 60 ML/MIN/1.73M2 — SIGNIFICANT CHANGE UP
EOSINOPHIL # BLD AUTO: 0.05 K/UL — SIGNIFICANT CHANGE UP (ref 0–0.5)
EOSINOPHIL NFR BLD AUTO: 1 % — SIGNIFICANT CHANGE UP (ref 0–6)
GLUCOSE SERPL-MCNC: 150 MG/DL — HIGH (ref 70–99)
HCT VFR BLD CALC: 34.8 % — LOW (ref 39–50)
HGB BLD-MCNC: 11.8 G/DL — LOW (ref 13–17)
IMM GRANULOCYTES NFR BLD AUTO: 0.4 % — SIGNIFICANT CHANGE UP (ref 0–0.9)
LDH SERPL L TO P-CCNC: 153 U/L — SIGNIFICANT CHANGE UP (ref 50–242)
LYMPHOCYTES # BLD AUTO: 1.67 K/UL — SIGNIFICANT CHANGE UP (ref 1–3.3)
LYMPHOCYTES # BLD AUTO: 32.2 % — SIGNIFICANT CHANGE UP (ref 13–44)
MCHC RBC-ENTMCNC: 30.3 PG — SIGNIFICANT CHANGE UP (ref 27–34)
MCHC RBC-ENTMCNC: 33.9 GM/DL — SIGNIFICANT CHANGE UP (ref 32–36)
MCV RBC AUTO: 89.5 FL — SIGNIFICANT CHANGE UP (ref 80–100)
MONOCYTES # BLD AUTO: 0.82 K/UL — SIGNIFICANT CHANGE UP (ref 0–0.9)
MONOCYTES NFR BLD AUTO: 15.8 % — HIGH (ref 2–14)
NEUTROPHILS # BLD AUTO: 2.53 K/UL — SIGNIFICANT CHANGE UP (ref 1.8–7.4)
NEUTROPHILS NFR BLD AUTO: 48.7 % — SIGNIFICANT CHANGE UP (ref 43–77)
NRBC # BLD: 0 /100 WBCS — SIGNIFICANT CHANGE UP (ref 0–0)
PLATELET # BLD AUTO: 143 K/UL — LOW (ref 150–400)
POTASSIUM SERPL-MCNC: 4.7 MMOL/L — SIGNIFICANT CHANGE UP (ref 3.5–5.3)
POTASSIUM SERPL-SCNC: 4.7 MMOL/L — SIGNIFICANT CHANGE UP (ref 3.5–5.3)
PROT SERPL-MCNC: 7.6 G/DL — SIGNIFICANT CHANGE UP (ref 6–8.3)
RBC # BLD: 3.89 M/UL — LOW (ref 4.2–5.8)
RBC # FLD: 13.6 % — SIGNIFICANT CHANGE UP (ref 10.3–14.5)
SODIUM SERPL-SCNC: 136 MMOL/L — SIGNIFICANT CHANGE UP (ref 135–145)
WBC # BLD: 5.19 K/UL — SIGNIFICANT CHANGE UP (ref 3.8–10.5)
WBC # FLD AUTO: 5.19 K/UL — SIGNIFICANT CHANGE UP (ref 3.8–10.5)

## 2024-07-05 PROCEDURE — 99213 OFFICE O/P EST LOW 20 MIN: CPT

## 2024-07-26 ENCOUNTER — LABORATORY RESULT (OUTPATIENT)
Age: 69
End: 2024-07-26

## 2024-08-01 ENCOUNTER — APPOINTMENT (OUTPATIENT)
Dept: ENDOCRINOLOGY | Facility: CLINIC | Age: 69
End: 2024-08-01
Payer: COMMERCIAL

## 2024-08-01 ENCOUNTER — RESULT CHARGE (OUTPATIENT)
Age: 69
End: 2024-08-01

## 2024-08-01 VITALS
HEIGHT: 71.5 IN | WEIGHT: 181 LBS | BODY MASS INDEX: 24.79 KG/M2 | SYSTOLIC BLOOD PRESSURE: 112 MMHG | RESPIRATION RATE: 16 BRPM | HEART RATE: 78 BPM | DIASTOLIC BLOOD PRESSURE: 68 MMHG | OXYGEN SATURATION: 97 %

## 2024-08-01 VITALS
HEART RATE: 69 BPM | BODY MASS INDEX: 29.68 KG/M2 | OXYGEN SATURATION: 98 % | WEIGHT: 212 LBS | HEIGHT: 71 IN | DIASTOLIC BLOOD PRESSURE: 78 MMHG | SYSTOLIC BLOOD PRESSURE: 118 MMHG

## 2024-08-01 DIAGNOSIS — E03.9 HYPOTHYROIDISM, UNSPECIFIED: ICD-10-CM

## 2024-08-01 DIAGNOSIS — I10 ESSENTIAL (PRIMARY) HYPERTENSION: ICD-10-CM

## 2024-08-01 DIAGNOSIS — E78.5 HYPERLIPIDEMIA, UNSPECIFIED: ICD-10-CM

## 2024-08-01 DIAGNOSIS — E11.65 TYPE 2 DIABETES MELLITUS WITH HYPERGLYCEMIA: ICD-10-CM

## 2024-08-01 LAB — GLUCOSE BLDC GLUCOMTR-MCNC: 126

## 2024-08-01 PROCEDURE — 99214 OFFICE O/P EST MOD 30 MIN: CPT

## 2024-08-01 NOTE — ASSESSMENT
[FreeTextEntry1] : Dm 2 well controlled in patient with HTN, HLD, obesity.    Dm2: a1c 6.6 continue  metformin 1000 mg BID , decrease Ozempic to 0.25 mg /wk due to diarrhea with higher doses  bgs 2x day  CKD stage III : monitor. GFR  stable.  eye exam Utd  microalb spot normal.   BP:  Continue current management.  HLD: lipids at target.  Continue atorvastatin 10 mg qd and continue fenofibrate   obesity: reviewed diet and exercise    hypoT : pt euthyroid clinically and biochemically.  Cont  Lt4 125 mcg qd     Vitamin d defic: cont. supplement    Anemia: seen by hematology and felt it was secondary to CKD / UTI .   Goiter ? : normal US

## 2024-08-01 NOTE — PHYSICAL EXAM
[Alert] : alert [Well Nourished] : well nourished [No Acute Distress] : no acute distress [Well Developed] : well developed [Normal Sclera/Conjunctiva] : normal sclera/conjunctiva [EOMI] : extra ocular movement intact [No Proptosis] : no proptosis [Normal Oropharynx] : the oropharynx was normal [No Respiratory Distress] : no respiratory distress [No Accessory Muscle Use] : no accessory muscle use [Clear to Auscultation] : lungs were clear to auscultation bilaterally [Normal S1, S2] : normal S1 and S2 [Normal Rate] : heart rate was normal [Regular Rhythm] : with a regular rhythm [No Edema] : no peripheral edema [Pedal Pulses Normal] : the pedal pulses are present [Normal Bowel Sounds] : normal bowel sounds [Not Tender] : non-tender [Not Distended] : not distended [Soft] : abdomen soft [Normal Anterior Cervical Nodes] : no anterior cervical lymphadenopathy [Oriented x3] : oriented to person, place, and time [Acanthosis Nigricans] : no acanthosis nigricans [de-identified] : L thyroid nodule palpated

## 2024-08-13 ENCOUNTER — APPOINTMENT (OUTPATIENT)
Dept: OTOLARYNGOLOGY | Facility: CLINIC | Age: 69
End: 2024-08-13
Payer: COMMERCIAL

## 2024-08-13 DIAGNOSIS — J34.89 OTHER SPECIFIED DISORDERS OF NOSE AND NASAL SINUSES: ICD-10-CM

## 2024-08-13 DIAGNOSIS — L98.0 PYOGENIC GRANULOMA: ICD-10-CM

## 2024-08-13 PROCEDURE — 99213 OFFICE O/P EST LOW 20 MIN: CPT | Mod: 25

## 2024-08-13 PROCEDURE — 31231 NASAL ENDOSCOPY DX: CPT

## 2024-08-13 NOTE — ASSESSMENT
[FreeTextEntry1] : 68 year old male with CMML referred by Dr. Darling presents for evaluation of growth within left nostril. On exam, R DNS, lesion along left anterior septum, raised cecial about 1 cm.  - L nasal biopsy collected 06/27/24 path showed ulceration with polypoid inflamed granulation tissue consistent with pyogenic granuloma.   today following up after biopsy of left nasal lesion. No reoccurrence seen on scope. mild crusting which he stats has been lifelong  - proceed with moisturization twice a day with bacitracin ointment for 2 weeks - continue nasal saline irrigations

## 2024-08-13 NOTE — CONSULT LETTER
[FreeTextEntry1] : Dear Dr. RASHEED GALLEGOS I had the pleasure of evaluating your patient BOBBY MORRIS, thank you for allowing us to participate in their care. please see full note detailing our visit below. If you have any questions, please do not hesitate to call me and I would be happy to discuss further.   Jt Omalley M.D. Attending Physician,   Department of Otolaryngology - Head and Neck Surgery [facility]  Office: [phone] Fax: [phone]

## 2024-08-13 NOTE — HISTORY OF PRESENT ILLNESS
[de-identified] : 68 year old male previously seen by Dr Darling presents for evaluation of growth wtihin left nostril. Patient states he has had bleeding from left nostril for last few months. States blood would drip out his nose and onto the floor randomly a few times a day. States does get mild weeping of blood on and off as well. Denies difficulty breathing or stuffy nose. Denies pain. Denies fevers or recent weight loss. Was diagnosed with CMML lately.   Patient is following up for left nostril lesion, biopsy was done last week and path showed ulceration with polypoid inflamed granulation tissue consistent with pyogenic granuloma.  no bleeding or pain after the procedure [FreeTextEntry1] :  Patient is following up for left nostril lesion, states doing well. Denies bleeding or pain. applying bacitracin

## 2024-08-14 ENCOUNTER — APPOINTMENT (OUTPATIENT)
Dept: DERMATOLOGY | Facility: CLINIC | Age: 69
End: 2024-08-14
Payer: COMMERCIAL

## 2024-08-14 DIAGNOSIS — L82.1 OTHER SEBORRHEIC KERATOSIS: ICD-10-CM

## 2024-08-14 DIAGNOSIS — D22.5 MELANOCYTIC NEVI OF TRUNK: ICD-10-CM

## 2024-08-14 DIAGNOSIS — D18.00 HEMANGIOMA UNSPECIFIED SITE: ICD-10-CM

## 2024-08-14 PROCEDURE — 99214 OFFICE O/P EST MOD 30 MIN: CPT

## 2024-08-14 NOTE — ASSESSMENT
[FreeTextEntry1] : Complete skin examination is negative for malignancy; Multiple new concerns were addressed and discussed. Therapeutic options and their risks and benefits; along with multiple diagnostic possibilities were discussed at length; risks and benefits of skin biopsy and/or other further study were discussed;  Continue regular exams; Multiple Bixby nevi, also angiomas/SKs;  larger nevus L lower back likely 2 adjacent nevi- nevus unchanged from prior measurement;   Follow up for TBSE in 1 year

## 2024-08-14 NOTE — HISTORY OF PRESENT ILLNESS
[de-identified] : Pt. presents for skin check; c/o few spots of concern;   Severity:  mild   Modifying factors:  none Associated symptoms:  none Context:  no association with activity Hx multiple nevi;

## 2024-08-14 NOTE — PHYSICAL EXAM
[Full Body Skin Exam Performed] : performed [FreeTextEntry3] : Skin examination performed of the face, neck, trunk, arms, legs;  The patient is well, alert and oriented, pleasant and cooperative. Eyelids, conjunctivae, oral mucosa, digits and nails all normal.   No cervical adenopathy.  Normal findings include:  Seborrheic keratoses- largest on R lateral thigh Angiomas- some larger lesions on trunk Lentigines Multiple benign nevi were noted. Some nevi exhibited mildly atypical features, but none were suspicious for malignancy.   Large nevus spilus L lower abdomen-  Large, dark, regular compound nevus L lower back- 1.4cm;   No lesions suspicious for malignancy.

## 2024-10-02 ENCOUNTER — RX RENEWAL (OUTPATIENT)
Age: 69
End: 2024-10-02

## 2024-10-06 ENCOUNTER — OUTPATIENT (OUTPATIENT)
Dept: OUTPATIENT SERVICES | Facility: HOSPITAL | Age: 69
LOS: 1 days | Discharge: HOME | End: 2024-10-06

## 2024-10-06 DIAGNOSIS — D64.9 ANEMIA, UNSPECIFIED: ICD-10-CM

## 2024-10-06 DIAGNOSIS — Z98.890 OTHER SPECIFIED POSTPROCEDURAL STATES: Chronic | ICD-10-CM

## 2024-10-09 ENCOUNTER — APPOINTMENT (OUTPATIENT)
Dept: PHARMACY | Facility: CLINIC | Age: 69
End: 2024-10-09

## 2024-10-09 PROCEDURE — V5010 ASSESSMENT FOR HEARING AID: CPT | Mod: NC

## 2024-10-11 ENCOUNTER — APPOINTMENT (OUTPATIENT)
Dept: HEMATOLOGY ONCOLOGY | Facility: CLINIC | Age: 69
End: 2024-10-11
Payer: COMMERCIAL

## 2024-10-11 ENCOUNTER — RESULT REVIEW (OUTPATIENT)
Age: 69
End: 2024-10-11

## 2024-10-11 VITALS
OXYGEN SATURATION: 97 % | TEMPERATURE: 97.3 F | SYSTOLIC BLOOD PRESSURE: 122 MMHG | WEIGHT: 210 LBS | HEIGHT: 71 IN | DIASTOLIC BLOOD PRESSURE: 76 MMHG | BODY MASS INDEX: 29.4 KG/M2 | HEART RATE: 74 BPM

## 2024-10-11 DIAGNOSIS — C93.10 CHRONIC MYELOMONOCYTIC LEUKEMIA NOT HAVING ACHIEVED REMISSION: ICD-10-CM

## 2024-10-11 LAB
BASOPHILS # BLD AUTO: 0.09 K/UL — SIGNIFICANT CHANGE UP (ref 0–0.2)
BASOPHILS NFR BLD AUTO: 2.1 % — HIGH (ref 0–2)
EOSINOPHIL # BLD AUTO: 0.03 K/UL — SIGNIFICANT CHANGE UP (ref 0–0.5)
EOSINOPHIL NFR BLD AUTO: 0.7 % — SIGNIFICANT CHANGE UP (ref 0–6)
HCT VFR BLD CALC: 35.9 % — LOW (ref 39–50)
HGB BLD-MCNC: 12.3 G/DL — LOW (ref 13–17)
IMM GRANULOCYTES NFR BLD AUTO: 0.2 % — SIGNIFICANT CHANGE UP (ref 0–0.9)
LYMPHOCYTES # BLD AUTO: 1.91 K/UL — SIGNIFICANT CHANGE UP (ref 1–3.3)
LYMPHOCYTES # BLD AUTO: 44.2 % — HIGH (ref 13–44)
MCHC RBC-ENTMCNC: 30.9 PG — SIGNIFICANT CHANGE UP (ref 27–34)
MCHC RBC-ENTMCNC: 34.3 GM/DL — SIGNIFICANT CHANGE UP (ref 32–36)
MCV RBC AUTO: 90.2 FL — SIGNIFICANT CHANGE UP (ref 80–100)
MONOCYTES # BLD AUTO: 0.74 K/UL — SIGNIFICANT CHANGE UP (ref 0–0.9)
MONOCYTES NFR BLD AUTO: 17.1 % — HIGH (ref 2–14)
NEUTROPHILS # BLD AUTO: 1.54 K/UL — LOW (ref 1.8–7.4)
NEUTROPHILS NFR BLD AUTO: 35.7 % — LOW (ref 43–77)
NRBC # BLD: 0 /100 WBCS — SIGNIFICANT CHANGE UP (ref 0–0)
PLATELET # BLD AUTO: 133 K/UL — LOW (ref 150–400)
RBC # BLD: 3.98 M/UL — LOW (ref 4.2–5.8)
RBC # FLD: 13.9 % — SIGNIFICANT CHANGE UP (ref 10.3–14.5)
WBC # BLD: 4.32 K/UL — SIGNIFICANT CHANGE UP (ref 3.8–10.5)
WBC # FLD AUTO: 4.32 K/UL — SIGNIFICANT CHANGE UP (ref 3.8–10.5)

## 2024-10-11 PROCEDURE — 99213 OFFICE O/P EST LOW 20 MIN: CPT

## 2024-10-15 LAB
ALBUMIN SERPL ELPH-MCNC: 4.7 G/DL
ALP BLD-CCNC: 48 U/L
ALT SERPL-CCNC: 19 U/L
ANION GAP SERPL CALC-SCNC: 13 MMOL/L
AST SERPL-CCNC: 20 U/L
BILIRUB SERPL-MCNC: 0.7 MG/DL
BUN SERPL-MCNC: 20 MG/DL
CALCIUM SERPL-MCNC: 10.2 MG/DL
CHLORIDE SERPL-SCNC: 101 MMOL/L
CO2 SERPL-SCNC: 22 MMOL/L
CREAT SERPL-MCNC: 1.26 MG/DL
EGFR: 61 ML/MIN/1.73M2
GLUCOSE SERPL-MCNC: 140 MG/DL
LDH SERPL-CCNC: 150 U/L
POTASSIUM SERPL-SCNC: 3.8 MMOL/L
PROT SERPL-MCNC: 7.7 G/DL
SODIUM SERPL-SCNC: 137 MMOL/L

## 2024-10-16 DIAGNOSIS — C93.10 CHRONIC MYELOMONOCYTIC LEUKEMIA NOT HAVING ACHIEVED REMISSION: ICD-10-CM

## 2024-10-23 ENCOUNTER — APPOINTMENT (OUTPATIENT)
Dept: PHARMACY | Facility: CLINIC | Age: 69
End: 2024-10-23
Payer: SELF-PAY

## 2024-10-23 PROCEDURE — V5261A: CUSTOM

## 2024-11-13 ENCOUNTER — APPOINTMENT (OUTPATIENT)
Dept: PHARMACY | Facility: CLINIC | Age: 69
End: 2024-11-13

## 2024-11-13 PROCEDURE — V5299A: CUSTOM

## 2024-11-24 NOTE — CDI QUERY NOTE - NSCDI_DOCCLARIFY2_GEN_ALL_CORE_FT
Upon admission, O2 in 80's on RA, stabilized on 2L NC Found in 80's and now on 2 liters stable  Admission CXR:   Right-sided PICC line with tip at the caval atrial junction. Mildly prominent reticular interstitial markings. Correlate for interstitial edema/CHF.   BNP of 201  Pt with hx of HFpEF - last TTE in 2020 with EF of 50%   repeat echo 11/21/24 with EF of 60%, normal systolic function, with severe annular calcification of MV, mild MR, moderate MV stenosis, moderate TV regurgitation with mildly elevated right ventricular systolic pressure  Pt s/p 10 mg IV lasix x1 on 11/20 - pt incontinent of urine so unable to obtain I&Os and pt refuses abraham catheter     Plan:  Will diurese again in AM of 11/24 w 20 mg IV lasix x 1 (due to low body weight) & lang  Added salt and 2L fluid restrictions to diet  AM amb pulse ox       Documentation clarification is required for accuracy in coding and billing, claim validation and reporting severity of illness, quality data and risk of mortality.

## 2024-12-11 ENCOUNTER — APPOINTMENT (OUTPATIENT)
Dept: PHARMACY | Facility: CLINIC | Age: 69
End: 2024-12-11

## 2024-12-11 PROCEDURE — V5299A: CUSTOM

## 2024-12-18 ENCOUNTER — APPOINTMENT (OUTPATIENT)
Dept: ENDOCRINOLOGY | Facility: CLINIC | Age: 69
End: 2024-12-18
Payer: COMMERCIAL

## 2024-12-18 VITALS
OXYGEN SATURATION: 97 % | BODY MASS INDEX: 29.4 KG/M2 | WEIGHT: 210 LBS | HEART RATE: 71 BPM | SYSTOLIC BLOOD PRESSURE: 126 MMHG | DIASTOLIC BLOOD PRESSURE: 80 MMHG | HEIGHT: 71 IN

## 2024-12-18 DIAGNOSIS — E78.5 HYPERLIPIDEMIA, UNSPECIFIED: ICD-10-CM

## 2024-12-18 DIAGNOSIS — E04.9 NONTOXIC GOITER, UNSPECIFIED: ICD-10-CM

## 2024-12-18 DIAGNOSIS — E11.65 TYPE 2 DIABETES MELLITUS WITH HYPERGLYCEMIA: ICD-10-CM

## 2024-12-18 DIAGNOSIS — E03.9 HYPOTHYROIDISM, UNSPECIFIED: ICD-10-CM

## 2024-12-18 DIAGNOSIS — E66.9 OBESITY, UNSPECIFIED: ICD-10-CM

## 2024-12-18 LAB — GLUCOSE BLDC GLUCOMTR-MCNC: 132

## 2024-12-18 PROCEDURE — G2211 COMPLEX E/M VISIT ADD ON: CPT

## 2024-12-18 PROCEDURE — 82962 GLUCOSE BLOOD TEST: CPT

## 2024-12-18 PROCEDURE — 99214 OFFICE O/P EST MOD 30 MIN: CPT

## 2024-12-18 RX ORDER — VITAMIN B COMPLEX
CAPSULE ORAL
Refills: 0 | Status: ACTIVE | COMMUNITY

## 2024-12-19 LAB
ANION GAP SERPL CALC-SCNC: 15 MMOL/L
BUN SERPL-MCNC: 14 MG/DL
CALCIUM SERPL-MCNC: 10.2 MG/DL
CHLORIDE SERPL-SCNC: 102 MMOL/L
CO2 SERPL-SCNC: 21 MMOL/L
CREAT SERPL-MCNC: 1.11 MG/DL
EGFR: 71 ML/MIN/1.73M2
GLUCOSE SERPL-MCNC: 100 MG/DL
POTASSIUM SERPL-SCNC: 4.2 MMOL/L
SODIUM SERPL-SCNC: 137 MMOL/L
T4 FREE SERPL-MCNC: 1.7 NG/DL
TSH SERPL-ACNC: 2.53 UIU/ML

## 2024-12-23 ENCOUNTER — RX RENEWAL (OUTPATIENT)
Age: 69
End: 2024-12-23

## 2025-01-23 ENCOUNTER — APPOINTMENT (OUTPATIENT)
Dept: UROLOGY | Facility: CLINIC | Age: 70
End: 2025-01-23
Payer: COMMERCIAL

## 2025-01-23 VITALS
WEIGHT: 210 LBS | SYSTOLIC BLOOD PRESSURE: 157 MMHG | DIASTOLIC BLOOD PRESSURE: 83 MMHG | OXYGEN SATURATION: 97 % | HEART RATE: 77 BPM | BODY MASS INDEX: 29.4 KG/M2 | HEIGHT: 71 IN

## 2025-01-23 DIAGNOSIS — N40.1 BENIGN PROSTATIC HYPERPLASIA WITH LOWER URINARY TRACT SYMPMS: ICD-10-CM

## 2025-01-23 DIAGNOSIS — N32.81 OVERACTIVE BLADDER: ICD-10-CM

## 2025-01-23 DIAGNOSIS — Z12.5 ENCOUNTER FOR SCREENING FOR MALIGNANT NEOPLASM OF PROSTATE: ICD-10-CM

## 2025-01-23 DIAGNOSIS — N13.8 BENIGN PROSTATIC HYPERPLASIA WITH LOWER URINARY TRACT SYMPMS: ICD-10-CM

## 2025-01-23 PROCEDURE — 99213 OFFICE O/P EST LOW 20 MIN: CPT

## 2025-01-23 RX ORDER — OXYBUTYNIN CHLORIDE 5 MG/1
5 TABLET ORAL 3 TIMES DAILY
Qty: 30 | Refills: 2 | Status: ACTIVE | COMMUNITY
Start: 2025-01-23 | End: 1900-01-01

## 2025-01-24 LAB — PSA SERPL-MCNC: 1.5 NG/ML

## 2025-01-30 ENCOUNTER — OUTPATIENT (OUTPATIENT)
Dept: OUTPATIENT SERVICES | Facility: HOSPITAL | Age: 70
LOS: 1 days | Discharge: HOME | End: 2025-01-30

## 2025-01-30 DIAGNOSIS — C93.10 CHRONIC MYELOMONOCYTIC LEUKEMIA NOT HAVING ACHIEVED REMISSION: ICD-10-CM

## 2025-01-30 DIAGNOSIS — Z98.890 OTHER SPECIFIED POSTPROCEDURAL STATES: Chronic | ICD-10-CM

## 2025-01-31 ENCOUNTER — NON-APPOINTMENT (OUTPATIENT)
Age: 70
End: 2025-01-31

## 2025-01-31 ENCOUNTER — APPOINTMENT (OUTPATIENT)
Dept: HEMATOLOGY ONCOLOGY | Facility: CLINIC | Age: 70
End: 2025-01-31

## 2025-01-31 ENCOUNTER — RESULT REVIEW (OUTPATIENT)
Age: 70
End: 2025-01-31

## 2025-01-31 VITALS
BODY MASS INDEX: 28.98 KG/M2 | WEIGHT: 207 LBS | SYSTOLIC BLOOD PRESSURE: 133 MMHG | HEART RATE: 73 BPM | HEIGHT: 71 IN | DIASTOLIC BLOOD PRESSURE: 72 MMHG | OXYGEN SATURATION: 98 % | TEMPERATURE: 97.3 F

## 2025-01-31 LAB
BASOPHILS # BLD AUTO: 0.1 K/UL — SIGNIFICANT CHANGE UP (ref 0–0.2)
BASOPHILS NFR BLD AUTO: 2.1 % — HIGH (ref 0–2)
EOSINOPHIL # BLD AUTO: 0.04 K/UL — SIGNIFICANT CHANGE UP (ref 0–0.5)
EOSINOPHIL NFR BLD AUTO: 0.8 % — SIGNIFICANT CHANGE UP (ref 0–6)
HCT VFR BLD CALC: 37.5 % — LOW (ref 39–50)
HGB BLD-MCNC: 12.6 G/DL — LOW (ref 13–17)
IMM GRANULOCYTES NFR BLD AUTO: 0.4 % — SIGNIFICANT CHANGE UP (ref 0–0.9)
LYMPHOCYTES # BLD AUTO: 1.74 K/UL — SIGNIFICANT CHANGE UP (ref 1–3.3)
LYMPHOCYTES # BLD AUTO: 36.8 % — SIGNIFICANT CHANGE UP (ref 13–44)
MCHC RBC-ENTMCNC: 30.7 PG — SIGNIFICANT CHANGE UP (ref 27–34)
MCHC RBC-ENTMCNC: 33.6 G/DL — SIGNIFICANT CHANGE UP (ref 32–36)
MCV RBC AUTO: 91.2 FL — SIGNIFICANT CHANGE UP (ref 80–100)
MONOCYTES # BLD AUTO: 0.65 K/UL — SIGNIFICANT CHANGE UP (ref 0–0.9)
MONOCYTES NFR BLD AUTO: 13.7 % — SIGNIFICANT CHANGE UP (ref 2–14)
NEUTROPHILS # BLD AUTO: 2.18 K/UL — SIGNIFICANT CHANGE UP (ref 1.8–7.4)
NEUTROPHILS NFR BLD AUTO: 46.2 % — SIGNIFICANT CHANGE UP (ref 43–77)
NRBC # BLD: 0 /100 WBCS — SIGNIFICANT CHANGE UP (ref 0–0)
NRBC BLD-RTO: 0 /100 WBCS — SIGNIFICANT CHANGE UP (ref 0–0)
PLATELET # BLD AUTO: 133 K/UL — LOW (ref 150–400)
RBC # BLD: 4.11 M/UL — LOW (ref 4.2–5.8)
RBC # FLD: 13.5 % — SIGNIFICANT CHANGE UP (ref 10.3–14.5)
WBC # BLD: 4.73 K/UL — SIGNIFICANT CHANGE UP (ref 3.8–10.5)
WBC # FLD AUTO: 4.73 K/UL — SIGNIFICANT CHANGE UP (ref 3.8–10.5)

## 2025-01-31 PROCEDURE — 99213 OFFICE O/P EST LOW 20 MIN: CPT

## 2025-02-03 ENCOUNTER — APPOINTMENT (OUTPATIENT)
Dept: ENDOCRINOLOGY | Facility: CLINIC | Age: 70
End: 2025-02-03

## 2025-02-04 LAB
ALBUMIN SERPL ELPH-MCNC: 4.7 G/DL
ALP BLD-CCNC: 53 U/L
ALT SERPL-CCNC: 23 U/L
ANION GAP SERPL CALC-SCNC: 16 MMOL/L
AST SERPL-CCNC: 23 U/L
BILIRUB SERPL-MCNC: 0.6 MG/DL
BUN SERPL-MCNC: 18 MG/DL
CALCIUM SERPL-MCNC: 10.2 MG/DL
CHLORIDE SERPL-SCNC: 103 MMOL/L
CO2 SERPL-SCNC: 21 MMOL/L
CREAT SERPL-MCNC: 1.16 MG/DL
EGFR: 67 ML/MIN/1.73M2
GLUCOSE SERPL-MCNC: 174 MG/DL
LDH SERPL-CCNC: 157 U/L
POTASSIUM SERPL-SCNC: 4.4 MMOL/L
PROT SERPL-MCNC: 7.7 G/DL
SODIUM SERPL-SCNC: 140 MMOL/L

## 2025-02-21 ENCOUNTER — RX RENEWAL (OUTPATIENT)
Age: 70
End: 2025-02-21

## 2025-03-07 ENCOUNTER — RX RENEWAL (OUTPATIENT)
Age: 70
End: 2025-03-07

## 2025-03-10 ENCOUNTER — NON-APPOINTMENT (OUTPATIENT)
Age: 70
End: 2025-03-10

## 2025-03-10 DIAGNOSIS — A49.9 URINARY TRACT INFECTION, SITE NOT SPECIFIED: ICD-10-CM

## 2025-03-10 DIAGNOSIS — N39.0 URINARY TRACT INFECTION, SITE NOT SPECIFIED: ICD-10-CM

## 2025-03-10 DIAGNOSIS — N20.0 CALCULUS OF KIDNEY: ICD-10-CM

## 2025-03-17 ENCOUNTER — OUTPATIENT (OUTPATIENT)
Dept: OUTPATIENT SERVICES | Facility: HOSPITAL | Age: 70
LOS: 1 days | End: 2025-03-17
Payer: COMMERCIAL

## 2025-03-17 ENCOUNTER — APPOINTMENT (OUTPATIENT)
Dept: ULTRASOUND IMAGING | Facility: CLINIC | Age: 70
End: 2025-03-17
Payer: COMMERCIAL

## 2025-03-17 ENCOUNTER — APPOINTMENT (OUTPATIENT)
Dept: RADIOLOGY | Facility: CLINIC | Age: 70
End: 2025-03-17
Payer: COMMERCIAL

## 2025-03-17 DIAGNOSIS — N20.0 CALCULUS OF KIDNEY: ICD-10-CM

## 2025-03-17 DIAGNOSIS — N39.0 URINARY TRACT INFECTION, SITE NOT SPECIFIED: ICD-10-CM

## 2025-03-17 DIAGNOSIS — Z98.890 OTHER SPECIFIED POSTPROCEDURAL STATES: Chronic | ICD-10-CM

## 2025-03-17 PROCEDURE — 74018 RADEX ABDOMEN 1 VIEW: CPT | Mod: 26

## 2025-03-17 PROCEDURE — 76770 US EXAM ABDO BACK WALL COMP: CPT

## 2025-03-17 PROCEDURE — 76770 US EXAM ABDO BACK WALL COMP: CPT | Mod: 26

## 2025-03-17 PROCEDURE — 74018 RADEX ABDOMEN 1 VIEW: CPT

## 2025-03-25 ENCOUNTER — APPOINTMENT (OUTPATIENT)
Dept: UROLOGY | Facility: CLINIC | Age: 70
End: 2025-03-25
Payer: COMMERCIAL

## 2025-03-25 VITALS
HEIGHT: 71 IN | WEIGHT: 207 LBS | DIASTOLIC BLOOD PRESSURE: 72 MMHG | HEART RATE: 67 BPM | OXYGEN SATURATION: 98 % | BODY MASS INDEX: 28.98 KG/M2 | SYSTOLIC BLOOD PRESSURE: 117 MMHG | RESPIRATION RATE: 16 BRPM

## 2025-03-25 DIAGNOSIS — N21.0 CALCULUS IN BLADDER: ICD-10-CM

## 2025-03-25 DIAGNOSIS — N20.0 CALCULUS OF KIDNEY: ICD-10-CM

## 2025-03-25 PROCEDURE — 99213 OFFICE O/P EST LOW 20 MIN: CPT

## 2025-03-26 ENCOUNTER — OUTPATIENT (OUTPATIENT)
Dept: OUTPATIENT SERVICES | Facility: HOSPITAL | Age: 70
LOS: 1 days | End: 2025-03-26
Payer: COMMERCIAL

## 2025-03-26 ENCOUNTER — APPOINTMENT (OUTPATIENT)
Dept: RADIOLOGY | Facility: CLINIC | Age: 70
End: 2025-03-26
Payer: COMMERCIAL

## 2025-03-26 DIAGNOSIS — Z98.890 OTHER SPECIFIED POSTPROCEDURAL STATES: Chronic | ICD-10-CM

## 2025-03-26 DIAGNOSIS — N20.0 CALCULUS OF KIDNEY: ICD-10-CM

## 2025-03-26 PROCEDURE — 74018 RADEX ABDOMEN 1 VIEW: CPT | Mod: 26

## 2025-03-26 PROCEDURE — 74018 RADEX ABDOMEN 1 VIEW: CPT

## 2025-03-27 ENCOUNTER — APPOINTMENT (OUTPATIENT)
Dept: ENDOCRINOLOGY | Facility: CLINIC | Age: 70
End: 2025-03-27
Payer: COMMERCIAL

## 2025-03-27 VITALS
SYSTOLIC BLOOD PRESSURE: 126 MMHG | BODY MASS INDEX: 28.98 KG/M2 | HEART RATE: 78 BPM | HEIGHT: 71 IN | WEIGHT: 207 LBS | DIASTOLIC BLOOD PRESSURE: 64 MMHG | OXYGEN SATURATION: 98 %

## 2025-03-27 DIAGNOSIS — I10 ESSENTIAL (PRIMARY) HYPERTENSION: ICD-10-CM

## 2025-03-27 DIAGNOSIS — E11.65 TYPE 2 DIABETES MELLITUS WITH HYPERGLYCEMIA: ICD-10-CM

## 2025-03-27 DIAGNOSIS — E04.9 NONTOXIC GOITER, UNSPECIFIED: ICD-10-CM

## 2025-03-27 DIAGNOSIS — E78.5 HYPERLIPIDEMIA, UNSPECIFIED: ICD-10-CM

## 2025-03-27 DIAGNOSIS — E03.9 HYPOTHYROIDISM, UNSPECIFIED: ICD-10-CM

## 2025-03-27 LAB — GLUCOSE BLDC GLUCOMTR-MCNC: 135

## 2025-03-27 PROCEDURE — G2211 COMPLEX E/M VISIT ADD ON: CPT

## 2025-03-27 PROCEDURE — 82962 GLUCOSE BLOOD TEST: CPT

## 2025-03-27 PROCEDURE — 99214 OFFICE O/P EST MOD 30 MIN: CPT

## 2025-04-04 ENCOUNTER — OUTPATIENT (OUTPATIENT)
Dept: OUTPATIENT SERVICES | Facility: HOSPITAL | Age: 70
LOS: 1 days | End: 2025-04-04
Payer: COMMERCIAL

## 2025-04-04 VITALS
RESPIRATION RATE: 15 BRPM | WEIGHT: 202.83 LBS | HEART RATE: 65 BPM | HEIGHT: 72 IN | TEMPERATURE: 98 F | OXYGEN SATURATION: 100 % | DIASTOLIC BLOOD PRESSURE: 83 MMHG | SYSTOLIC BLOOD PRESSURE: 114 MMHG

## 2025-04-04 DIAGNOSIS — Z98.890 OTHER SPECIFIED POSTPROCEDURAL STATES: Chronic | ICD-10-CM

## 2025-04-04 DIAGNOSIS — Z01.818 ENCOUNTER FOR OTHER PREPROCEDURAL EXAMINATION: ICD-10-CM

## 2025-04-04 LAB
A1C WITH ESTIMATED AVERAGE GLUCOSE RESULT: 7.1 % — HIGH (ref 4–5.6)
ANION GAP SERPL CALC-SCNC: 4 MMOL/L — LOW (ref 5–17)
APPEARANCE UR: ABNORMAL
APTT BLD: 33.5 SEC — SIGNIFICANT CHANGE UP (ref 24.5–35.6)
BACTERIA # UR AUTO: ABNORMAL /HPF
BASOPHILS # BLD AUTO: 0.09 K/UL — SIGNIFICANT CHANGE UP (ref 0–0.2)
BASOPHILS NFR BLD AUTO: 2.4 % — HIGH (ref 0–2)
BILIRUB UR-MCNC: NEGATIVE — SIGNIFICANT CHANGE UP
BLD GP AB SCN SERPL QL: SIGNIFICANT CHANGE UP
BUN SERPL-MCNC: 17 MG/DL — SIGNIFICANT CHANGE UP (ref 7–23)
CALCIUM SERPL-MCNC: 10.5 MG/DL — HIGH (ref 8.5–10.1)
CAST: 2 /LPF — SIGNIFICANT CHANGE UP (ref 0–4)
CHLORIDE SERPL-SCNC: 107 MMOL/L — SIGNIFICANT CHANGE UP (ref 96–108)
CO2 SERPL-SCNC: 27 MMOL/L — SIGNIFICANT CHANGE UP (ref 22–31)
COLOR SPEC: YELLOW — SIGNIFICANT CHANGE UP
CREAT SERPL-MCNC: 1.34 MG/DL — HIGH (ref 0.5–1.3)
DIFF PNL FLD: ABNORMAL
EGFR: 57 ML/MIN/1.73M2 — LOW
EGFR: 57 ML/MIN/1.73M2 — LOW
EOSINOPHIL # BLD AUTO: 0.03 K/UL — SIGNIFICANT CHANGE UP (ref 0–0.5)
EOSINOPHIL NFR BLD AUTO: 0.8 % — SIGNIFICANT CHANGE UP (ref 0–6)
ESTIMATED AVERAGE GLUCOSE: 157 MG/DL — HIGH (ref 68–114)
GLUCOSE SERPL-MCNC: 175 MG/DL — HIGH (ref 70–99)
GLUCOSE UR QL: NEGATIVE MG/DL — SIGNIFICANT CHANGE UP
HCT VFR BLD CALC: 35 % — LOW (ref 39–50)
HCV AB S/CO SERPL IA: 0.44 S/CO — SIGNIFICANT CHANGE UP (ref 0–0.79)
HCV AB SERPL-IMP: SIGNIFICANT CHANGE UP
HGB BLD-MCNC: 11.4 G/DL — LOW (ref 13–17)
IMM GRANULOCYTES # BLD AUTO: 0.02 K/UL — SIGNIFICANT CHANGE UP (ref 0–0.07)
IMM GRANULOCYTES NFR BLD AUTO: 0.5 % — SIGNIFICANT CHANGE UP (ref 0–0.9)
INR BLD: 1.22 RATIO — HIGH (ref 0.85–1.16)
KETONES UR-MCNC: NEGATIVE MG/DL — SIGNIFICANT CHANGE UP
LEUKOCYTE ESTERASE UR-ACNC: ABNORMAL
LYMPHOCYTES # BLD AUTO: 1.78 K/UL — SIGNIFICANT CHANGE UP (ref 1–3.3)
LYMPHOCYTES NFR BLD AUTO: 46.6 % — HIGH (ref 13–44)
MCHC RBC-ENTMCNC: 29.8 PG — SIGNIFICANT CHANGE UP (ref 27–34)
MCHC RBC-ENTMCNC: 32.6 G/DL — SIGNIFICANT CHANGE UP (ref 32–36)
MCV RBC AUTO: 91.4 FL — SIGNIFICANT CHANGE UP (ref 80–100)
MONOCYTES # BLD AUTO: 0.36 K/UL — SIGNIFICANT CHANGE UP (ref 0–0.9)
MONOCYTES NFR BLD AUTO: 9.4 % — SIGNIFICANT CHANGE UP (ref 2–14)
NEUTROPHILS # BLD AUTO: 1.54 K/UL — LOW (ref 1.8–7.4)
NEUTROPHILS NFR BLD AUTO: 40.3 % — LOW (ref 43–77)
NITRITE UR-MCNC: POSITIVE
NRBC # BLD AUTO: 0 K/UL — SIGNIFICANT CHANGE UP (ref 0–0)
NRBC # FLD: 0 K/UL — SIGNIFICANT CHANGE UP (ref 0–0)
NRBC BLD AUTO-RTO: 0 /100 WBCS — SIGNIFICANT CHANGE UP (ref 0–0)
PH UR: 5.5 — SIGNIFICANT CHANGE UP (ref 5–8)
PLATELET # BLD AUTO: 268 K/UL — SIGNIFICANT CHANGE UP (ref 150–400)
PMV BLD: 11.5 FL — SIGNIFICANT CHANGE UP (ref 7–13)
POTASSIUM SERPL-MCNC: 4.2 MMOL/L — SIGNIFICANT CHANGE UP (ref 3.5–5.3)
POTASSIUM SERPL-SCNC: 4.2 MMOL/L — SIGNIFICANT CHANGE UP (ref 3.5–5.3)
PROT UR-MCNC: 30 MG/DL
PROTHROM AB SERPL-ACNC: 14 SEC — HIGH (ref 9.9–13.4)
RBC # BLD: 3.83 M/UL — LOW (ref 4.2–5.8)
RBC # FLD: 13.6 % — SIGNIFICANT CHANGE UP (ref 10.3–14.5)
RBC CASTS # UR COMP ASSIST: 13 /HPF — HIGH (ref 0–4)
SODIUM SERPL-SCNC: 138 MMOL/L — SIGNIFICANT CHANGE UP (ref 135–145)
SP GR SPEC: 1.02 — SIGNIFICANT CHANGE UP (ref 1–1.03)
SQUAMOUS # UR AUTO: 0 /HPF — SIGNIFICANT CHANGE UP (ref 0–5)
UROBILINOGEN FLD QL: 0.2 MG/DL — SIGNIFICANT CHANGE UP (ref 0.2–1)
WBC # BLD: 3.82 K/UL — SIGNIFICANT CHANGE UP (ref 3.8–10.5)
WBC # FLD AUTO: 3.82 K/UL — SIGNIFICANT CHANGE UP (ref 3.8–10.5)
WBC UR QL: 145 /HPF — HIGH (ref 0–5)

## 2025-04-04 PROCEDURE — 86850 RBC ANTIBODY SCREEN: CPT

## 2025-04-04 PROCEDURE — 86920 COMPATIBILITY TEST SPIN: CPT

## 2025-04-04 PROCEDURE — 86921 COMPATIBILITY TEST INCUBATE: CPT

## 2025-04-04 PROCEDURE — 81001 URINALYSIS AUTO W/SCOPE: CPT

## 2025-04-04 PROCEDURE — 86922 COMPATIBILITY TEST ANTIGLOB: CPT

## 2025-04-04 PROCEDURE — 86900 BLOOD TYPING SEROLOGIC ABO: CPT

## 2025-04-04 PROCEDURE — 87186 SC STD MICRODIL/AGAR DIL: CPT

## 2025-04-04 PROCEDURE — 80048 BASIC METABOLIC PNL TOTAL CA: CPT

## 2025-04-04 PROCEDURE — 93010 ELECTROCARDIOGRAM REPORT: CPT

## 2025-04-04 PROCEDURE — 87077 CULTURE AEROBIC IDENTIFY: CPT

## 2025-04-04 PROCEDURE — 85610 PROTHROMBIN TIME: CPT

## 2025-04-04 PROCEDURE — 99214 OFFICE O/P EST MOD 30 MIN: CPT | Mod: 25

## 2025-04-04 PROCEDURE — 93005 ELECTROCARDIOGRAM TRACING: CPT

## 2025-04-04 PROCEDURE — 86901 BLOOD TYPING SEROLOGIC RH(D): CPT

## 2025-04-04 PROCEDURE — 36415 COLL VENOUS BLD VENIPUNCTURE: CPT

## 2025-04-04 PROCEDURE — 87086 URINE CULTURE/COLONY COUNT: CPT

## 2025-04-04 PROCEDURE — 85730 THROMBOPLASTIN TIME PARTIAL: CPT

## 2025-04-04 PROCEDURE — 86803 HEPATITIS C AB TEST: CPT

## 2025-04-04 PROCEDURE — 85025 COMPLETE CBC W/AUTO DIFF WBC: CPT

## 2025-04-04 PROCEDURE — 83036 HEMOGLOBIN GLYCOSYLATED A1C: CPT

## 2025-04-04 NOTE — H&P PST ADULT - EKG AND INTERPRETATION
EKG done today, results forwarded to Cardiology for final interpretation EKG done today, results forwarded to Cardiology for final interpretation Sinus Rhythm 62 BPM; 1st degree AV block

## 2025-04-04 NOTE — H&P PST ADULT - NSANTHOSAYNRD_GEN_A_CORE
No. SENA screening performed.  STOP BANG Legend: 0-2 = LOW Risk; 3-4 = INTERMEDIATE Risk; 5-8 = HIGH Risk

## 2025-04-04 NOTE — H&P PST ADULT - NSICDXFAMILYHX_GEN_ALL_CORE_FT
FAMILY HISTORY:  Father  Still living? Unknown  FH: CAD (coronary artery disease), Age at diagnosis: Age Unknown    Mother  Still living? Unknown  FH: Alzheimers disease, Age at diagnosis: Age Unknown     FAMILY HISTORY:  Father  Still living? Unknown  FH: CAD (coronary artery disease), Age at diagnosis: Age Unknown    Mother  Still living? Unknown  FH: Alzheimers disease, Age at diagnosis: Age Unknown    Sibling  Still living? Yes, Estimated age: 71-80  Family history of malignant lymphatic neoplasm, Age at diagnosis: Age Unknown  FH: type 2 diabetes, Age at diagnosis: Age Unknown

## 2025-04-04 NOTE — H&P PST ADULT - ALLERGY TYPES
as above.  Given nafcillin for spine infections and developed elvated Creatinine levels/reactions to medicines

## 2025-04-04 NOTE — H&P PST ADULT - MUSCULOSKELETAL
negative ROM intact/no calf tenderness/normal gait/strength 5/5 bilateral upper extremities/strength 5/5 bilateral lower extremities/no chest wall tenderness details…

## 2025-04-04 NOTE — H&P PST ADULT - NSICDXPASTMEDICALHX_GEN_ALL_CORE_FT
PAST MEDICAL HISTORY:  DM (diabetes mellitus), type 2     History of staph infection lumbar spine    HLD (hyperlipidemia)     Hypothyroidism     Kidney stones      PAST MEDICAL HISTORY:  Bladder stone     CMML (chronic myelomonocytic leukemia)     DM (diabetes mellitus), type 2     H/O sepsis     History of BPH     History of staph infection lumbar spine    HLD (hyperlipidemia)     Hypothyroidism     Kidney stone     Kidney stones

## 2025-04-04 NOTE — H&P PST ADULT - ASSESSMENT
Patient Education        Anxiety Disorder: Care Instructions  Your Care Instructions     Anxiety is a normal reaction to stress. Difficult situations can cause you to have symptoms such as sweaty palms and a nervous feeling. In an anxiety disorder, the symptoms are far more severe. Constant worry, muscle tension, trouble sleeping, nausea and diarrhea, and other symptoms can make normal daily activities difficult or impossible. These symptoms may occur for no reason, and they can affect your work, school, or social life. Medicines, counseling, and self-care can all help. Follow-up care is a key part of your treatment and safety. Be sure to make and go to all appointments, and call your doctor if you are having problems. It's also a good idea to know your test results and keep a list of the medicines you take. How can you care for yourself at home? · Take medicines exactly as directed. Call your doctor if you think you are having a problem with your medicine. · Go to your counseling sessions and follow-up appointments. · Recognize and accept your anxiety. Then, when you are in a situation that makes you anxious, say to yourself, \"This is not an emergency. I feel uncomfortable, but I am not in danger. I can keep going even if I feel anxious. \"  · Be kind to your body:  ? Relieve tension with exercise or a massage. ? Get enough rest.  ? Avoid alcohol, caffeine, nicotine, and illegal drugs. They can increase your anxiety level and cause sleep problems. ? Learn and do relaxation techniques. See below for more about these techniques. · Engage your mind. Get out and do something you enjoy. Go to a funny movie, or take a walk or hike. Plan your day. Having too much or too little to do can make you anxious. · Keep a record of your symptoms. Discuss your fears with a good friend or family member, or join a support group for people with similar problems. Talking to others sometimes relieves stress.   · Get involved in play a relaxation tape while you drive a car. When should you call for help? Call 911 anytime you think you may need emergency care. For example, call if:    · You feel you cannot stop from hurting yourself or someone else. Keep the numbers for these national suicide hotlines: 1-073-491-TALK (6-129.995.3479) and 0-075-INBKVPB (8-290.650.3835). If you or someone you know talks about suicide or feeling hopeless, get help right away. Watch closely for changes in your health, and be sure to contact your doctor if:    · You have anxiety or fear that affects your life.     · You have symptoms of anxiety that are new or different from those you had before. Where can you learn more? Go to https://ForeSeepeAutoReflex.comeb.[employer]. org and sign in to your [employer] account. Enter P754 in the Inktd box to learn more about \"Anxiety Disorder: Care Instructions. \"     If you do not have an account, please click on the \"Sign Up Now\" link. Current as of: September 23, 2020               Content Version: 12.9  © 9162-8077 Healthwise, Incorporated. Care instructions adapted under license by [facility] ([facility]). If you have questions about a medical condition or this instruction, always ask your healthcare professional. [address] any warranty or liability for your use of this information. 69 year old male presents to PST for planned cystoscopy, bilateral ureteroscopy, lithotripsy of stone with ureteral stent placement, litholapaxy bladder stone >2.5 cm      Plan:  - PST instructions given; NPO status/instructions to be given by ASU   - Pt instructed to take following meds on day of surgery: synthroid  - Pt instructed to take routine evening medications unless indicated   - Stop NSAIDS ( Aspirin Aleve Motrin Mobic Diclofenac), herbal supplements, MVI, Vitamin E, fish oil 7 days prior to surgery unless directed by surgeon or cardiologist;   - Medical Optimization with Dr Georges  - CBC w/diff, BMP, HgbA1c,  type and screen, PT/INR, aPTT,done today  - EKG done today  - Ozempic last dose was 3/29. Discussed to hold until after procedure

## 2025-04-04 NOTE — H&P PST ADULT - HISTORY OF PRESENT ILLNESS
69 year old male with PMH  Patient presents to PST for planned  69 year old male with PMH CMML. Diabetes type 2, HLD, hypothyroid, BPH, kidney stones s/p lithotripsies in 2011/2012 with c/o Urinary tract infection 1 1/2 months ago, treated with antibiotics with resolution of fever, flank pain, dysuria.  Follow up with Dr. Olivier and imaging notable for b/l kidney stones and bladder stone.  Patient reports passing a kidney stone 1 1/2 weeks ago and s/p follow up with Dr. Olivier.  Patient currently denies fever, chills, dysuria, hematouria flank pain, suprapubic pain, chest pain, palpitations, shortness of breath.   Patient presents to PST for planned cystoscopy, bilateral ureteroscopy, lithotripsy of stone with ureteral stent placement, litholapaxy bladder stone >2.5 cm

## 2025-04-04 NOTE — H&P PST ADULT - NSICDXPASTSURGICALHX_GEN_ALL_CORE_FT
PAST SURGICAL HISTORY:  History of back surgery      PAST SURGICAL HISTORY:  H/O colonoscopy     History of back surgery     History of lithotripsy

## 2025-04-04 NOTE — H&P PST ADULT - HEMATOLOGY/LYMPHATICS COMMENTS
follows with HemOnc for CMML- diagnosed about 1 year ago- follows every 3-4 months for monitory- levels stable, no transfusions needed.

## 2025-04-05 DIAGNOSIS — Z01.818 ENCOUNTER FOR OTHER PREPROCEDURAL EXAMINATION: ICD-10-CM

## 2025-04-07 LAB
-  AMPICILLIN: SIGNIFICANT CHANGE UP
-  CIPROFLOXACIN: SIGNIFICANT CHANGE UP
-  LEVOFLOXACIN: SIGNIFICANT CHANGE UP
-  NITROFURANTOIN: SIGNIFICANT CHANGE UP
-  TETRACYCLINE: SIGNIFICANT CHANGE UP
-  VANCOMYCIN: SIGNIFICANT CHANGE UP
CULTURE RESULTS: ABNORMAL
METHOD TYPE: SIGNIFICANT CHANGE UP
ORGANISM # SPEC MICROSCOPIC CNT: ABNORMAL
ORGANISM # SPEC MICROSCOPIC CNT: SIGNIFICANT CHANGE UP
SPECIMEN SOURCE: SIGNIFICANT CHANGE UP

## 2025-04-07 RX ORDER — CIPROFLOXACIN HYDROCHLORIDE 500 MG/1
500 TABLET, FILM COATED ORAL TWICE DAILY
Qty: 14 | Refills: 0 | Status: ACTIVE | COMMUNITY
Start: 2025-04-07 | End: 1900-01-01

## 2025-04-07 NOTE — CHART NOTE - NSCHARTNOTEFT_GEN_A_CORE
Patient seen in PST 4/4/25 noted with UTI, urine culture and u/a faxed to surgeon. Call placed to surgeon office message left with Elise surgical coordinator.

## 2025-04-11 ENCOUNTER — TRANSCRIPTION ENCOUNTER (OUTPATIENT)
Age: 70
End: 2025-04-11

## 2025-04-11 ENCOUNTER — APPOINTMENT (OUTPATIENT)
Dept: UROLOGY | Facility: HOSPITAL | Age: 70
End: 2025-04-11

## 2025-04-11 ENCOUNTER — RESULT REVIEW (OUTPATIENT)
Age: 70
End: 2025-04-11

## 2025-04-11 ENCOUNTER — OUTPATIENT (OUTPATIENT)
Dept: INPATIENT UNIT | Facility: HOSPITAL | Age: 70
LOS: 1 days | Discharge: HOME | End: 2025-04-11
Payer: COMMERCIAL

## 2025-04-11 VITALS
DIASTOLIC BLOOD PRESSURE: 76 MMHG | TEMPERATURE: 98 F | SYSTOLIC BLOOD PRESSURE: 122 MMHG | HEART RATE: 72 BPM | OXYGEN SATURATION: 100 % | RESPIRATION RATE: 16 BRPM

## 2025-04-11 VITALS
TEMPERATURE: 98 F | WEIGHT: 210.1 LBS | RESPIRATION RATE: 8 BRPM | HEIGHT: 72 IN | SYSTOLIC BLOOD PRESSURE: 127 MMHG | HEART RATE: 59 BPM | DIASTOLIC BLOOD PRESSURE: 78 MMHG | OXYGEN SATURATION: 100 %

## 2025-04-11 DIAGNOSIS — N20.0 CALCULUS OF KIDNEY: ICD-10-CM

## 2025-04-11 DIAGNOSIS — Z98.890 OTHER SPECIFIED POSTPROCEDURAL STATES: Chronic | ICD-10-CM

## 2025-04-11 DIAGNOSIS — N21.0 CALCULUS IN BLADDER: ICD-10-CM

## 2025-04-11 PROCEDURE — 88300 SURGICAL PATH GROSS: CPT | Mod: 26

## 2025-04-11 PROCEDURE — 52356 CYSTO/URETERO W/LITHOTRIPSY: CPT | Mod: LT

## 2025-04-11 PROCEDURE — C1889: CPT

## 2025-04-11 PROCEDURE — 82365 CALCULUS SPECTROSCOPY: CPT

## 2025-04-11 PROCEDURE — C2617: CPT

## 2025-04-11 PROCEDURE — 76000 FLUOROSCOPY <1 HR PHYS/QHP: CPT

## 2025-04-11 PROCEDURE — C1769: CPT

## 2025-04-11 PROCEDURE — 88300 SURGICAL PATH GROSS: CPT

## 2025-04-11 PROCEDURE — 52317 REMOVE BLADDER STONE: CPT

## 2025-04-11 PROCEDURE — C1747: CPT

## 2025-04-11 RX ORDER — OXYBUTYNIN CHLORIDE 5 MG/1
5 TABLET, FILM COATED, EXTENDED RELEASE ORAL ONCE
Refills: 0 | Status: COMPLETED | OUTPATIENT
Start: 2025-04-11 | End: 2025-04-11

## 2025-04-11 RX ORDER — OXYCODONE HYDROCHLORIDE 30 MG/1
5 TABLET ORAL ONCE
Refills: 0 | Status: DISCONTINUED | OUTPATIENT
Start: 2025-04-11 | End: 2025-04-11

## 2025-04-11 RX ORDER — HYDROMORPHONE/SOD CHLOR,ISO/PF 2 MG/10 ML
0.5 SYRINGE (ML) INJECTION
Refills: 0 | Status: DISCONTINUED | OUTPATIENT
Start: 2025-04-11 | End: 2025-04-11

## 2025-04-11 RX ORDER — PHENAZOPYRIDINE HCL 100 MG
1 TABLET ORAL
Qty: 9 | Refills: 0
Start: 2025-04-11 | End: 2025-04-13

## 2025-04-11 RX ORDER — FENTANYL CITRATE-0.9 % NACL/PF 100MCG/2ML
50 SYRINGE (ML) INTRAVENOUS
Refills: 0 | Status: DISCONTINUED | OUTPATIENT
Start: 2025-04-11 | End: 2025-04-11

## 2025-04-11 RX ORDER — ONDANSETRON HCL/PF 4 MG/2 ML
4 VIAL (ML) INJECTION ONCE
Refills: 0 | Status: DISCONTINUED | OUTPATIENT
Start: 2025-04-11 | End: 2025-04-11

## 2025-04-11 RX ORDER — TRAMADOL HYDROCHLORIDE 50 MG/1
1 TABLET, FILM COATED ORAL
Qty: 12 | Refills: 0
Start: 2025-04-11 | End: 2025-04-13

## 2025-04-11 RX ORDER — PHENAZOPYRIDINE HCL 100 MG
200 TABLET ORAL ONCE
Refills: 0 | Status: COMPLETED | OUTPATIENT
Start: 2025-04-11 | End: 2025-04-11

## 2025-04-11 RX ORDER — OXYBUTYNIN CHLORIDE 5 MG/1
1 TABLET, FILM COATED, EXTENDED RELEASE ORAL
Qty: 15 | Refills: 0
Start: 2025-04-11 | End: 2025-04-15

## 2025-04-11 RX ADMIN — OXYCODONE HYDROCHLORIDE 5 MILLIGRAM(S): 30 TABLET ORAL at 14:45

## 2025-04-11 RX ADMIN — OXYBUTYNIN CHLORIDE 5 MILLIGRAM(S): 5 TABLET, FILM COATED, EXTENDED RELEASE ORAL at 14:09

## 2025-04-11 RX ADMIN — OXYCODONE HYDROCHLORIDE 5 MILLIGRAM(S): 30 TABLET ORAL at 15:27

## 2025-04-11 RX ADMIN — OXYCODONE HYDROCHLORIDE 5 MILLIGRAM(S): 30 TABLET ORAL at 15:40

## 2025-04-11 RX ADMIN — Medication 200 MILLIGRAM(S): at 14:06

## 2025-04-11 RX ADMIN — OXYCODONE HYDROCHLORIDE 5 MILLIGRAM(S): 30 TABLET ORAL at 15:51

## 2025-04-11 NOTE — ASU DISCHARGE PLAN (ADULT/PEDIATRIC) - FINANCIAL ASSISTANCE
[facility] provides services at a reduced cost to those who are determined to be eligible through [facility]’s financial assistance program. Information regarding [facility]’s financial assistance program can be found by going to [email or web address] or by calling [phone].

## 2025-04-11 NOTE — ASU PATIENT PROFILE, ADULT - FALL HARM RISK - UNIVERSAL INTERVENTIONS
Bed in lowest position, wheels locked, appropriate side rails in place/Call bell, personal items and telephone in reach/Instruct patient to call for assistance before getting out of bed or chair/Non-slip footwear when patient is out of bed/[place] to call system/Physically safe environment - no spills, clutter or unnecessary equipment/Purposeful Proactive Rounding/Room/bathroom lighting operational, light cord in reach

## 2025-04-11 NOTE — ASU DISCHARGE PLAN (ADULT/PEDIATRIC) - OK TO LEAVE MESSAGE ON VOICEMAIL
>>Yes

## 2025-04-11 NOTE — ASU PATIENT PROFILE, ADULT - NSICDXPASTMEDICALHX_GEN_ALL_CORE_FT
PAST MEDICAL HISTORY:  Bladder stone     CMML (chronic myelomonocytic leukemia)     DM (diabetes mellitus), type 2     H/O sepsis     History of BPH     History of staph infection lumbar spine    HLD (hyperlipidemia)     Hypothyroidism     Kidney stone     Kidney stones

## 2025-04-11 NOTE — ASU DISCHARGE PLAN (ADULT/PEDIATRIC) - NS MD DC FALL RISK RISK
For information on Fall & Injury Prevention, visit: [email or web address] OR  [email or web address] OR  https://www.cdc.gov/steadi/patient.html

## 2025-04-11 NOTE — BRIEF OPERATIVE NOTE - OPERATION/FINDINGS
fossa navicularis stricture, bulbar stricture. Stone in LEFT lower pole calyx behind stenotic infundibulum, dusted with laser. Bladder stone fragmented with laser and extracted via cystoscope.

## 2025-04-11 NOTE — ASU PREOP CHECKLIST - LOOSE TEETH
Subjective   Mami Srivastava is a 71 y.o. female.     Urinary Tract Infection    This is a new problem. The current episode started in the past 7 days. The problem has been gradually worsening (off and on). The quality of the pain is described as aching and burning. The pain is at a severity of 3/10. The pain is mild. There has been no fever. She is not sexually active. There is no history of pyelonephritis. Pertinent negatives include no chills, flank pain, frequency, hematuria, possible pregnancy or urgency. She has tried nothing (pushing fluids, increased water) for the symptoms. The treatment provided no relief.        The following portions of the patient's history were reviewed and updated as appropriate: allergies, current medications, past family history, past medical history, past social history, past surgical history and problem list.    Review of Systems   Constitutional: Negative for chills.   HENT: Positive for congestion, rhinorrhea, sinus pressure and sore throat.    Respiratory: Negative.    Cardiovascular: Negative.    Gastrointestinal: Negative.    Genitourinary: Negative for flank pain, frequency, hematuria and urgency.   Musculoskeletal: Negative.    Neurological: Negative.        Objective   Physical Exam   Constitutional: She is cooperative. No distress.   HENT:   Head: Normocephalic.   Right Ear: Hearing, tympanic membrane, external ear and ear canal normal.   Left Ear: Hearing, tympanic membrane, external ear and ear canal normal.   Nose: Rhinorrhea, sinus tenderness and congestion present.   Mouth/Throat: Posterior oropharyngeal erythema present.   Eyes: Pupils are equal, round, and reactive to light. Conjunctivae, EOM and lids are normal.   Neck: Trachea normal and full passive range of motion without pain.   Cardiovascular: Normal rate, regular rhythm and normal pulses.    Pulmonary/Chest: Effort normal and breath sounds normal.   Abdominal: There is tenderness in the suprapubic area.    Lymphadenopathy:     She has cervical adenopathy.        Right cervical: Superficial cervical adenopathy present.        Left cervical: Superficial cervical adenopathy present.   Neurological: She is alert.   Skin: Skin is warm. Capillary refill takes less than 2 seconds.   Psychiatric: She has a normal mood and affect. Her speech is normal and behavior is normal.   Vitals reviewed.        Assessment/Plan   Mami was seen today for urinary tract infection.    Diagnoses and all orders for this visit:    Acute cystitis with hematuria  -     Urine Culture, Comprehen ([facility]) - Urine, Clean Catch  -     POC Urinalysis Dipstick, Automated    Acute frontal sinusitis, recurrence not specified    Other orders  -     sulfamethoxazole-trimethoprim (BACTRIM DS) 800-160 MG per tablet; Take 1 tablet by mouth 2 (Two) Times a Day for 7 days.                no

## 2025-04-11 NOTE — BRIEF OPERATIVE NOTE - NSICDXBRIEFPROCEDURE_GEN_ALL_CORE_FT
PROCEDURES:  Ureteroscopy with laser lithotripsy and stent placement 11-Apr-2025 13:42:12  Luis A Olivier  Litholapaxy, calculus, bladder, simple 11-Apr-2025 13:42:34  Luis A Olivier

## 2025-04-11 NOTE — ASU DISCHARGE PLAN (ADULT/PEDIATRIC) - NURSING INSTRUCTIONS
For any problems or concerns,contact your doctor. [facility] patients should call the [facility]. If you cannot reach the doctor or clinic, call [facility] Emergency Department at [phone] or go to your local Emergency Department.  A responsible adult should be with you for the rest of the day and night for your safety and to help you if you needed. Resume your medications as listed on the attached Medication Record.  Begin with liquids and light food ( tea, toast, Jello, soups). Advance to what you normally eat. Liquids should taken in adequate amounts today.     CALL the DOCTOR:    -Fever greater than  101F  - Signs  of infection such as : increase pain,swelling,redness,or a bad  smell coming from the wound.  -Excessive amount of bleeding.  - Any pain that appears to be getting worse.  - Vomiting  -  If you have  not urinated 8 hours after surgery or have any difficulty urinating.     A responsible adult should be with you for the rest of the day and night for your safety and to help you if you needed.    Review attached FACT SHEET if applicable.

## 2025-04-11 NOTE — BRIEF OPERATIVE NOTE - NSICDXBRIEFPREOP_GEN_ALL_CORE_FT
PRE-OP DIAGNOSIS:  Kidney stone on left side 11-Apr-2025 13:42:43  Luis A Olivier  Bladder stone 11-Apr-2025 13:42:52  Luis A Olivier

## 2025-04-11 NOTE — ASU DISCHARGE PLAN (ADULT/PEDIATRIC) - PROCEDURE
Cystoscopy, LEFT ureteroscopy, laser lithotripsy, LEFT ureteral stent placement, laser cystolitholapaxy

## 2025-04-12 ENCOUNTER — NON-APPOINTMENT (OUTPATIENT)
Age: 70
End: 2025-04-12

## 2025-04-12 ENCOUNTER — INPATIENT (INPATIENT)
Facility: HOSPITAL | Age: 70
LOS: 2 days | Discharge: HOME | DRG: 690 | End: 2025-04-15
Attending: STUDENT IN AN ORGANIZED HEALTH CARE EDUCATION/TRAINING PROGRAM | Admitting: HOSPITALIST
Payer: COMMERCIAL

## 2025-04-12 VITALS — WEIGHT: 210.1 LBS | HEIGHT: 72 IN

## 2025-04-12 DIAGNOSIS — Z98.890 OTHER SPECIFIED POSTPROCEDURAL STATES: Chronic | ICD-10-CM

## 2025-04-12 DIAGNOSIS — N39.0 URINARY TRACT INFECTION, SITE NOT SPECIFIED: ICD-10-CM

## 2025-04-12 PROBLEM — N21.0 CALCULUS IN BLADDER: Chronic | Status: ACTIVE | Noted: 2025-04-04

## 2025-04-12 PROBLEM — Z87.438 PERSONAL HISTORY OF OTHER DISEASES OF MALE GENITAL ORGANS: Chronic | Status: ACTIVE | Noted: 2025-04-04

## 2025-04-12 PROBLEM — N20.0 CALCULUS OF KIDNEY: Chronic | Status: ACTIVE | Noted: 2025-04-04

## 2025-04-12 LAB
ALBUMIN SERPL ELPH-MCNC: 3.8 G/DL — SIGNIFICANT CHANGE UP (ref 3.3–5)
ALP SERPL-CCNC: 45 U/L — SIGNIFICANT CHANGE UP (ref 40–120)
ALT FLD-CCNC: 21 U/L — SIGNIFICANT CHANGE UP (ref 12–78)
ANION GAP SERPL CALC-SCNC: 11 MMOL/L — SIGNIFICANT CHANGE UP (ref 5–17)
APPEARANCE UR: ABNORMAL
AST SERPL-CCNC: 14 U/L — LOW (ref 15–37)
BACTERIA # UR AUTO: ABNORMAL /HPF
BASOPHILS # BLD AUTO: 0.05 K/UL — SIGNIFICANT CHANGE UP (ref 0–0.2)
BASOPHILS NFR BLD AUTO: 0.1 % — SIGNIFICANT CHANGE UP (ref 0–2)
BILIRUB SERPL-MCNC: 1.3 MG/DL — HIGH (ref 0.2–1.2)
BILIRUB UR-MCNC: ABNORMAL
BUN SERPL-MCNC: 31 MG/DL — HIGH (ref 7–23)
CALCIUM SERPL-MCNC: 9.6 MG/DL — SIGNIFICANT CHANGE UP (ref 8.5–10.1)
CAST: 1 /LPF — SIGNIFICANT CHANGE UP (ref 0–4)
CHLORIDE SERPL-SCNC: 98 MMOL/L — SIGNIFICANT CHANGE UP (ref 96–108)
CO2 SERPL-SCNC: 21 MMOL/L — LOW (ref 22–31)
COLOR SPEC: ABNORMAL
COMMENT - URINE: SIGNIFICANT CHANGE UP
CREAT SERPL-MCNC: 2.72 MG/DL — HIGH (ref 0.5–1.3)
DIFF PNL FLD: ABNORMAL
EGFR: 24 ML/MIN/1.73M2 — LOW
EGFR: 24 ML/MIN/1.73M2 — LOW
EOSINOPHIL # BLD AUTO: 0.06 K/UL — SIGNIFICANT CHANGE UP (ref 0–0.5)
EOSINOPHIL NFR BLD AUTO: 0.2 % — SIGNIFICANT CHANGE UP (ref 0–6)
GLUCOSE BLDC GLUCOMTR-MCNC: 160 MG/DL — HIGH (ref 70–99)
GLUCOSE SERPL-MCNC: 200 MG/DL — HIGH (ref 70–99)
GLUCOSE UR QL: NEGATIVE MG/DL — SIGNIFICANT CHANGE UP
HCT VFR BLD CALC: 33.8 % — LOW (ref 39–50)
HGB BLD-MCNC: 11.3 G/DL — LOW (ref 13–17)
IMM GRANULOCYTES # BLD AUTO: 0.45 K/UL — HIGH (ref 0–0.07)
IMM GRANULOCYTES NFR BLD AUTO: 1.2 % — HIGH (ref 0–0.9)
KETONES UR-MCNC: NEGATIVE MG/DL — SIGNIFICANT CHANGE UP
LACTATE SERPL-SCNC: 3.9 MMOL/L — HIGH (ref 0.7–2)
LACTATE SERPL-SCNC: 3.9 MMOL/L — HIGH (ref 0.7–2)
LEUKOCYTE ESTERASE UR-ACNC: ABNORMAL
LIDOCAIN IGE QN: 25 U/L — SIGNIFICANT CHANGE UP (ref 13–75)
LYMPHOCYTES # BLD AUTO: 0.9 K/UL — LOW (ref 1–3.3)
LYMPHOCYTES NFR BLD AUTO: 2.4 % — LOW (ref 13–44)
MANUAL SMEAR VERIFICATION: SIGNIFICANT CHANGE UP
MCHC RBC-ENTMCNC: 30.5 PG — SIGNIFICANT CHANGE UP (ref 27–34)
MCHC RBC-ENTMCNC: 33.4 G/DL — SIGNIFICANT CHANGE UP (ref 32–36)
MCV RBC AUTO: 91.4 FL — SIGNIFICANT CHANGE UP (ref 80–100)
MONOCYTES # BLD AUTO: 6.71 K/UL — HIGH (ref 0–0.9)
MONOCYTES NFR BLD AUTO: 18.3 % — HIGH (ref 2–14)
NEUTROPHILS # BLD AUTO: 28.57 K/UL — HIGH (ref 1.8–7.4)
NEUTROPHILS NFR BLD AUTO: 77.8 % — HIGH (ref 43–77)
NITRITE UR-MCNC: POSITIVE
NRBC # BLD AUTO: 0 K/UL — SIGNIFICANT CHANGE UP (ref 0–0)
NRBC # FLD: 0 K/UL — SIGNIFICANT CHANGE UP (ref 0–0)
NRBC BLD AUTO-RTO: 0 /100 WBCS — SIGNIFICANT CHANGE UP (ref 0–0)
PH UR: 5 — SIGNIFICANT CHANGE UP (ref 5–8)
PLAT MORPH BLD: NORMAL — SIGNIFICANT CHANGE UP
PLATELET # BLD AUTO: 171 K/UL — SIGNIFICANT CHANGE UP (ref 150–400)
PMV BLD: 12.1 FL — SIGNIFICANT CHANGE UP (ref 7–13)
POTASSIUM SERPL-MCNC: 3.8 MMOL/L — SIGNIFICANT CHANGE UP (ref 3.5–5.3)
POTASSIUM SERPL-SCNC: 3.8 MMOL/L — SIGNIFICANT CHANGE UP (ref 3.5–5.3)
PROT SERPL-MCNC: 8.4 GM/DL — HIGH (ref 6–8.3)
PROT UR-MCNC: 300 MG/DL
RBC # BLD: 3.7 M/UL — LOW (ref 4.2–5.8)
RBC # FLD: 14.4 % — SIGNIFICANT CHANGE UP (ref 10.3–14.5)
RBC BLD AUTO: NORMAL — SIGNIFICANT CHANGE UP
RBC CASTS # UR COMP ASSIST: >1900 /HPF — HIGH (ref 0–4)
SODIUM SERPL-SCNC: 130 MMOL/L — LOW (ref 135–145)
SP GR SPEC: 1.02 — SIGNIFICANT CHANGE UP (ref 1–1.03)
SQUAMOUS # UR AUTO: 5 /HPF — SIGNIFICANT CHANGE UP (ref 0–5)
UROBILINOGEN FLD QL: 0.2 MG/DL — SIGNIFICANT CHANGE UP (ref 0.2–1)
WBC # BLD: 36.74 K/UL — HIGH (ref 3.8–10.5)
WBC # FLD AUTO: 36.74 K/UL — HIGH (ref 3.8–10.5)
WBC MORPHOLOGY: NORMAL — SIGNIFICANT CHANGE UP
WBC UR QL: 74 /HPF — HIGH (ref 0–5)

## 2025-04-12 PROCEDURE — 82962 GLUCOSE BLOOD TEST: CPT

## 2025-04-12 PROCEDURE — 83605 ASSAY OF LACTIC ACID: CPT

## 2025-04-12 PROCEDURE — 36415 COLL VENOUS BLD VENIPUNCTURE: CPT

## 2025-04-12 PROCEDURE — 74177 CT ABD & PELVIS W/CONTRAST: CPT | Mod: 26

## 2025-04-12 PROCEDURE — 85027 COMPLETE CBC AUTOMATED: CPT

## 2025-04-12 PROCEDURE — 99285 EMERGENCY DEPT VISIT HI MDM: CPT

## 2025-04-12 PROCEDURE — 76770 US EXAM ABDO BACK WALL COMP: CPT

## 2025-04-12 PROCEDURE — 99223 1ST HOSP IP/OBS HIGH 75: CPT

## 2025-04-12 PROCEDURE — 99222 1ST HOSP IP/OBS MODERATE 55: CPT

## 2025-04-12 PROCEDURE — 80048 BASIC METABOLIC PNL TOTAL CA: CPT

## 2025-04-12 RX ORDER — ONDANSETRON HCL/PF 4 MG/2 ML
4 VIAL (ML) INJECTION EVERY 8 HOURS
Refills: 0 | Status: DISCONTINUED | OUTPATIENT
Start: 2025-04-12 | End: 2025-04-15

## 2025-04-12 RX ORDER — SODIUM CHLORIDE 9 G/1000ML
1000 INJECTION, SOLUTION INTRAVENOUS
Refills: 0 | Status: DISCONTINUED | OUTPATIENT
Start: 2025-04-12 | End: 2025-04-15

## 2025-04-12 RX ORDER — TRAMADOL HYDROCHLORIDE 50 MG/1
1 TABLET, FILM COATED ORAL
Refills: 0 | DISCHARGE

## 2025-04-12 RX ORDER — ATORVASTATIN CALCIUM 80 MG/1
10 TABLET, FILM COATED ORAL AT BEDTIME
Refills: 0 | Status: DISCONTINUED | OUTPATIENT
Start: 2025-04-12 | End: 2025-04-15

## 2025-04-12 RX ORDER — OXYBUTYNIN CHLORIDE 5 MG/1
5 TABLET, FILM COATED, EXTENDED RELEASE ORAL EVERY 8 HOURS
Refills: 0 | Status: DISCONTINUED | OUTPATIENT
Start: 2025-04-12 | End: 2025-04-15

## 2025-04-12 RX ORDER — CEFTRIAXONE 500 MG/1
1000 INJECTION, POWDER, FOR SOLUTION INTRAMUSCULAR; INTRAVENOUS ONCE
Refills: 0 | Status: DISCONTINUED | OUTPATIENT
Start: 2025-04-12 | End: 2025-04-12

## 2025-04-12 RX ORDER — FENOFIBRATE 160 MG/1
1 TABLET ORAL
Refills: 0 | DISCHARGE

## 2025-04-12 RX ORDER — ASPIRIN 325 MG
81 TABLET ORAL DAILY
Refills: 0 | Status: DISCONTINUED | OUTPATIENT
Start: 2025-04-12 | End: 2025-04-15

## 2025-04-12 RX ORDER — DEXTROSE 50 % IN WATER 50 %
15 SYRINGE (ML) INTRAVENOUS ONCE
Refills: 0 | Status: DISCONTINUED | OUTPATIENT
Start: 2025-04-12 | End: 2025-04-15

## 2025-04-12 RX ORDER — LACTOBACILLUS ACIDOPHILUS/PECT 75 MM-100
1 CAPSULE ORAL DAILY
Refills: 0 | Status: DISCONTINUED | OUTPATIENT
Start: 2025-04-12 | End: 2025-04-15

## 2025-04-12 RX ORDER — ACETAMINOPHEN 500 MG/5ML
650 LIQUID (ML) ORAL EVERY 6 HOURS
Refills: 0 | Status: DISCONTINUED | OUTPATIENT
Start: 2025-04-12 | End: 2025-04-15

## 2025-04-12 RX ORDER — SEMAGLUTIDE 1 MG/.5ML
0.5 INJECTION, SOLUTION SUBCUTANEOUS
Refills: 0 | DISCHARGE

## 2025-04-12 RX ORDER — DEXTROSE 50 % IN WATER 50 %
25 SYRINGE (ML) INTRAVENOUS ONCE
Refills: 0 | Status: DISCONTINUED | OUTPATIENT
Start: 2025-04-12 | End: 2025-04-15

## 2025-04-12 RX ORDER — ACETAMINOPHEN 500 MG/5ML
1000 LIQUID (ML) ORAL ONCE
Refills: 0 | Status: COMPLETED | OUTPATIENT
Start: 2025-04-12 | End: 2025-04-12

## 2025-04-12 RX ORDER — PHENAZOPYRIDINE HCL 100 MG
200 TABLET ORAL EVERY 8 HOURS
Refills: 0 | Status: DISCONTINUED | OUTPATIENT
Start: 2025-04-12 | End: 2025-04-12

## 2025-04-12 RX ORDER — CEFTRIAXONE 500 MG/1
1000 INJECTION, POWDER, FOR SOLUTION INTRAMUSCULAR; INTRAVENOUS ONCE
Refills: 0 | Status: COMPLETED | OUTPATIENT
Start: 2025-04-12 | End: 2025-04-12

## 2025-04-12 RX ORDER — LEVOTHYROXINE SODIUM 300 MCG
112 TABLET ORAL DAILY
Refills: 0 | Status: DISCONTINUED | OUTPATIENT
Start: 2025-04-12 | End: 2025-04-15

## 2025-04-12 RX ORDER — GLUCAGON 3 MG/1
1 POWDER NASAL ONCE
Refills: 0 | Status: DISCONTINUED | OUTPATIENT
Start: 2025-04-12 | End: 2025-04-15

## 2025-04-12 RX ORDER — LEVOTHYROXINE SODIUM 300 MCG
1 TABLET ORAL
Refills: 0 | DISCHARGE

## 2025-04-12 RX ORDER — INSULIN LISPRO 100 U/ML
INJECTION, SOLUTION INTRAVENOUS; SUBCUTANEOUS
Refills: 0 | Status: DISCONTINUED | OUTPATIENT
Start: 2025-04-12 | End: 2025-04-15

## 2025-04-12 RX ORDER — MELATONIN 5 MG
3 TABLET ORAL AT BEDTIME
Refills: 0 | Status: DISCONTINUED | OUTPATIENT
Start: 2025-04-12 | End: 2025-04-15

## 2025-04-12 RX ORDER — CIPROFLOXACIN HCL 250 MG
1 TABLET ORAL
Refills: 0 | DISCHARGE

## 2025-04-12 RX ORDER — TAMSULOSIN HYDROCHLORIDE 0.4 MG/1
0.4 CAPSULE ORAL AT BEDTIME
Refills: 0 | Status: DISCONTINUED | OUTPATIENT
Start: 2025-04-12 | End: 2025-04-13

## 2025-04-12 RX ORDER — PIPERACILLIN-TAZO-DEXTROSE,ISO 3.375G/5
3.38 IV SOLUTION, PIGGYBACK PREMIX FROZEN(ML) INTRAVENOUS EVERY 8 HOURS
Refills: 0 | Status: DISCONTINUED | OUTPATIENT
Start: 2025-04-12 | End: 2025-04-15

## 2025-04-12 RX ORDER — FENOFIBRATE 160 MG/1
48 TABLET ORAL DAILY
Refills: 0 | Status: DISCONTINUED | OUTPATIENT
Start: 2025-04-12 | End: 2025-04-15

## 2025-04-12 RX ORDER — MAGNESIUM, ALUMINUM HYDROXIDE 200-200 MG
30 TABLET,CHEWABLE ORAL EVERY 4 HOURS
Refills: 0 | Status: DISCONTINUED | OUTPATIENT
Start: 2025-04-12 | End: 2025-04-15

## 2025-04-12 RX ADMIN — Medication 1 TABLET(S): at 16:40

## 2025-04-12 RX ADMIN — ATORVASTATIN CALCIUM 10 MILLIGRAM(S): 80 TABLET, FILM COATED ORAL at 20:55

## 2025-04-12 RX ADMIN — Medication 2000 MILLILITER(S): at 14:10

## 2025-04-12 RX ADMIN — Medication 400 MILLIGRAM(S): at 12:26

## 2025-04-12 RX ADMIN — Medication 81 MILLIGRAM(S): at 16:40

## 2025-04-12 RX ADMIN — Medication 500 MILLILITER(S): at 13:13

## 2025-04-12 RX ADMIN — Medication 25 GRAM(S): at 16:41

## 2025-04-12 RX ADMIN — Medication 25 GRAM(S): at 20:56

## 2025-04-12 RX ADMIN — TAMSULOSIN HYDROCHLORIDE 0.4 MILLIGRAM(S): 0.4 CAPSULE ORAL at 20:57

## 2025-04-12 RX ADMIN — FENOFIBRATE 48 MILLIGRAM(S): 160 TABLET ORAL at 16:40

## 2025-04-12 RX ADMIN — Medication 50 MILLILITER(S): at 19:08

## 2025-04-12 RX ADMIN — CEFTRIAXONE 1000 MILLIGRAM(S): 500 INJECTION, POWDER, FOR SOLUTION INTRAMUSCULAR; INTRAVENOUS at 14:58

## 2025-04-12 NOTE — H&P ADULT - NSHPPHYSICALEXAM_GEN_ALL_CORE
ICU Vital Signs Last 24 Hrs  T(C): 37 (12 Apr 2025 15:08), Max: 37 (12 Apr 2025 15:08)  T(F): 98.6 (12 Apr 2025 15:08), Max: 98.6 (12 Apr 2025 15:08)  HR: 65 (12 Apr 2025 15:08) (64 - 77)  BP: 109/54 (12 Apr 2025 15:08) (109/54 - 132/76)  BP(mean): 91 (12 Apr 2025 11:09) (91 - 91)  ABP: --  ABP(mean): --  RR: 16 (12 Apr 2025 15:08) (16 - 18)  SpO2: 95% (12 Apr 2025 15:08) (95% - 100%)    O2 Parameters below as of 12 Apr 2025 15:08  Patient On (Oxygen Delivery Method): room air            PHYSICAL EXAM:    Constitutional: NAD, awake and alert  HEENT: PERR, EOMI, Normal Hearing, MMM  Neck: Soft and supple, No LAD, No JVD  Respiratory: Breath sounds are clear bilaterally, No wheezing, rales or rhonchi  Cardiovascular: S1 and S2, regular rate and rhythm, no Murmurs, gallops or rubs  Gastrointestinal: Bowel Sounds present, soft, nontender, nondistended, no guarding, no rebound  : no flank tenderness; Adkins with orange tinged urine in collection bag, no blood or clots  Extremities: No peripheral edema  Vascular: 2+ peripheral pulses  Neurological: A/O x 3, no focal deficits  Musculoskeletal: 5/5 strength b/l upper and lower extremities  Skin: No rashes

## 2025-04-12 NOTE — ED STATDOCS - PHYSICAL EXAMINATION
Monitor. General: Patient in no acute distress, AAOX3.   HENMT: NC/AT, no nasal congestion, MMM  Neck: supple  CVS: regular rate and rhythm, no murmur  Resp: Good air entry bilaterally, No wheeze/rhonchi.  Abd: suprapubic TTP  Ext: FROM in all ext, 2+ pulses throughout  BACK: no midline tenderness, no stepoffs, no CVA ttp  NEURO: no focal deficit, gross motor and sensory intact throughout, gait stable. General: Patient in no acute distress, AAOX3.   HENMT: NC/AT, no nasal congestion, MMM  Neck: supple  CVS: regular rate and rhythm, no murmur  Resp: Good air entry bilaterally, No wheeze/rhonchi.  Abd: suprapubic TTP, ND, no rebound/guarding  Ext: FROM in all ext, 2+ pulses throughout  BACK: no midline tenderness, no stepoffs, no CVA ttp  NEURO: no focal deficit, gross motor and sensory intact throughout, gait stable.

## 2025-04-12 NOTE — ED ADULT TRIAGE NOTE - CHIEF COMPLAINT QUOTE
pt presents to the ER for difficulty urinating and pain since yesterday. pt was at  yesterday for kidney stone removal., no other complaints pt is well appearing.

## 2025-04-12 NOTE — ED ADULT NURSE NOTE - OBJECTIVE STATEMENT
Pt presents to ED in Super track, here for evaluation of urinary frequency, dysuria and hematuria since 7am this morning. Pt states "I had a procedure done yesterday and also had pain at time of discharge and was unable to pee as much since then but today its a bit worse. I only dribble a couple of drops of urine w/ blood." Pt also c/o diffuse abdominal pain. Pt returned from bathroom w/ small amount of urine in sterile urine specimen cup, unable to send urine specimen due to insufficient quantity provided. Pt bladder scanned for urine post void and observed to have >686ml in bladder. MD Guzman made aware, states to place 3-way rosenberg catheter at this time. Upon assessment for rosenberg catheter placement pt urethra observed to be sealed and bruising observed to tip of penis and also  unable to open without causing pain. DESMOND Cheng called to bedside and made aware, states she will call urology PA to have rosenberg catheter placed by them. IV established in left arm with a 20G, labs drawn and sent, call bell in reach, warm blanket provided, bed in lowest position, side rails up x2, MD evaluation in progress.

## 2025-04-12 NOTE — H&P ADULT - ASSESSMENT
"69y male w/ a PMHx of CMML, HLD, DM II who presents to the ED c/o difficulty urinating and abdominal pain. Patients/p ureteroscopy with laser lithotripsy and stent placement 4/11 with Dr. Olivier. Patient reported noticing some blood in urine post procedure however was able to void. He states that overnight he was unable to void, reports minimal drops of urine that appeared dark and bloody.  Reports lower abdominal pain with urge to void. Pt was found to be in urinary retention in the ED with ~650cc on bladder scan. Urology consulted for rosenberg placement. Patient states he was found to have UTI on PST with urine Cx growing enterococcus faecalis and was given antibiotics to take pre procedure. He was prescribed pyridium postoperatively as well. Denied dysuria, fever, or chills. "    69 M with pmh NIDDM, CMML, HLD recent ureteroscopy with laser lithotripsy and stent placement 4/11 p/w difficulty urinating and discolored urine and possible hematuria. No fevers. UA++, rosenberg placed in ED . CT c/w b/l hydro R>L. Scr: 2.7. Met sepsis criteria on arrival  Pt has no other complaint to offer.   Was evaluated by urology in ED.       #Sepsis - poa  #Complicated uti/suspect early pyelo  #Acute kidney injury/Mod right hydronephrosis  - admit to medicine  - reviewed prior culture data  - start zosyn (allergy to nafcillin: kidney injury per patient?..)  - blood and urine cultures  - Lactate 3.9-> awaiting reflex  - continue NS  - Hold pyridium with seferino  - cont bethanecol prn  - NPO a MN  - Kidney sono in am  - Uro: if hydro worse-> for ureteroscopy   - DVT px: SCD    #DM  - hold metformin  - insulin sliding scale  - A1C    Time spent: > 75 min spent during this encounter including  but not limited to chart review, labs/imaging, coordination of care and discussion with consultants.     D/W ED provider and wife at bedside

## 2025-04-12 NOTE — ED STATDOCS - ATTENDING APP SHARED VISIT CONTRIBUTION OF CARE
I Manish Guzman DO have personally seen and examined this patient.  I have fully participated in the care of this patient.  The initial assessment was performed by myself and then the patient was handed off to the ACP. The patient was followed and re-evaluated by the ACP. All labs, imaging and procedures were evaluated and performed by the ACP and I was available for consultation if any questions in the patients care came up.I have made amendments to the documentation where appropriate and otherwise agree with the history, physical exam, and plan as documented by the KALYANI.

## 2025-04-12 NOTE — H&P ADULT - NSHPREVIEWOFSYSTEMS_GEN_ALL_CORE
REVIEW OF SYSTEMS:    CONSTITUTIONAL: No weakness, fevers or chills  EYES/ENT: No visual changes;  No vertigo or throat pain   NECK: No pain or stiffness  RESPIRATORY: No cough, wheezing, hemoptysis; No shortness of breath  CARDIOVASCULAR: No chest pain or palpitations  GASTROINTESTINAL: No abdominal or epigastric pain. No nausea, vomiting, or hematemesis; No diarrhea or constipation. No melena or hematochezia.  GENITOURINARY: + dysuria  NEUROLOGICAL: No numbness or weakness  SKIN: No itching, burning, rashes, or lesions   All other review of systems is negative unless indicated above

## 2025-04-12 NOTE — CONSULT NOTE ADULT - NS ATTEND AMEND GEN_ALL_CORE FT
Agree with above.  70yo M history of kidney stones, BPH s/p cystolithalopaxy and left ureteroscopy yesterday 4/11 with Dr. Olivier. Now has developed acute urinary retention with TERRY and leukocytosis. Also found on Ct to have right distal ureteral stones.  Afebrile, HDS.     Acute urinary retention, BPH  - not unexpected after cystoscopy in setting of BPH  - continue tamsulosin 0.8mg daily  - maintain rosenberg for at least 3 days, if discharged can do TOV as outpatient    Right ureteral stones, TERRY  - repeat creatinine and CBC in AM - TERRY and leukocytosis could be from the AUR  - check renal ultrasound in AM - if hydronephrosis is persistent and labs do not show improvement may need right ureteral stenting. Patient has no flank pain currently so if hydronephrosis is minimal or resolved can likely attempt to pass stones as outpatient.   - NPO at midnight in case of need for procedure  - consider changing antibiotics to cipro or levofloxacin based on prior culture of enterococcus from 4/3/25

## 2025-04-12 NOTE — ED ADULT NURSE NOTE - NSFALLRISKINTERV_ED_ALL_ED
Assistance with ambulation/Communicate fall risk and risk factors to all staff, patient, and family/Provide visual cue: yellow wristband, yellow gown, etc/Reinforce activity limits and safety measures with patient and family/Call bell, personal items and telephone in reach/Instruct patient to call for assistance before getting out of bed/chair/stretcher/Non-slip footwear applied when patient is off stretcher/[place] to call system/Physically safe environment - no spills, clutter or unnecessary equipment/Purposeful Proactive Rounding/Room/bathroom lighting operational, light cord in reach

## 2025-04-12 NOTE — H&P ADULT - NSHPLABSRESULTS_GEN_ALL_CORE
LABS: All Labs Reviewed:                        11.3   36.74 )-----------( 171      ( 12 Apr 2025 11:36 )             33.8     04-12    130[L]  |  98  |  31[H]  ----------------------------<  200[H]  3.8   |  21[L]  |  2.72[H]    Ca    9.6      12 Apr 2025 11:36    TPro  8.4[H]  /  Alb  3.8  /  TBili  1.3[H]  /  DBili  x   /  AST  14[L]  /  ALT  21  /  AlkPhos  45  04-12              Blood Culture:

## 2025-04-12 NOTE — ED STATDOCS - OBJECTIVE STATEMENT
Pt is a 69y male w/ a PMHx of CMML, HLD, DM II who presents to the ED c/o difficulty urinating and abdominal pain. Patient had a ureteroscopy with laser lithotripsy and stent placement s/p L sided kidney stone done yesterday w/ Dr. Olivier. Patient had a small amount of urination after the procedure, but now has developed urgency/frequency w/ small amounts of output, described as "10-15 drops of blood." Endorses abdominal fullness and nausea. Denies vomiting, fevers, or chills. Patient has also been on Cipro for a UTI but was stopped yesterday for his procedure.

## 2025-04-12 NOTE — CONSULT NOTE ADULT - SUBJECTIVE AND OBJECTIVE BOX
69y male w/ a PMHx of CMML, HLD, DM II who presents to the ED c/o difficulty urinating and abdominal pain. Patients/p ureteroscopy with laser lithotripsy and stent placement 4/11 with Dr. Olivier. Patient reported noticing some blood in urine post procedure however was able to void. He states that overnight he was unable to void, reports minimal drops of urine that appeared dark and bloody.  Reports lower abdominal pain with urge to void. Pt was found to be in urinary retention in the ED with ~650cc on bladder scan. Urology consulted for rosenberg placement. Patient states he was found to have UTI on PST with urine Cx growing enterococcus faecalis and was given antibiotics to take pre procedure. He was prescribed pyridium postoperatively as well. Denied dysuria, fever, or chills.     Upon ED work up, patient was found to have WBC 36. Cr 2.7. CT findings were consistent with multiple distal right ureteral stones measuring up to 6 mm with moderate right hydroureteronephrosis and mild left hydroureteronephrosis with left ureteral stent in expected location extending from an interpolar calyx to the bladder.    PAST MEDICAL & SURGICAL HISTORY:  HLD (hyperlipidemia)  DM (diabetes mellitus), type 2  Kidney stones  Hypothyroidism  History of staph infection  lumbar spine  CMML (chronic myelomonocytic leukemia)  Bladder stone  Kidney stone  H/O sepsis  History of BPH  History of back surgery  History of lithotripsy  H/O colonoscopy    Allergies  nafcillin (Other)    Intolerances    Vital Signs Last 24 Hrs  T(C): 36.4 (12 Apr 2025 11:09), Max: 36.6 (11 Apr 2025 14:45)  T(F): 97.6 (12 Apr 2025 11:09), Max: 97.8 (11 Apr 2025 14:45)  HR: 77 (12 Apr 2025 11:09) (45 - 77)  BP: 130/74 (12 Apr 2025 11:09) (117/69 - 143/81)  BP(mean): 91 (12 Apr 2025 11:09) (91 - 91)  RR: 18 (12 Apr 2025 11:09) (10 - 19)  SpO2: 100% (12 Apr 2025 11:09) (98% - 100%)    Parameters below as of 12 Apr 2025 11:09  Patient On (Oxygen Delivery Method): room air    Physical:  Gen: A&Ox3. NAD  Chest: respiration unlabored, symmetrical chest rise. no accessory muscle use.  Abd: Soft ND, NT  : 22fr 10cc balloon 3-way catheter placed at bedside with output of 800cc of dark urine. Irrigated with NS with minimal removal of clots until draining clear with orange tint.     LABS:             11.3   36.74 )-----------( 171      ( 12 Apr 2025 11:36 )             33.8              04-12    130[L]  |  98  |  31[H]  ----------------------------<  200[H]  3.8   |  21[L]  |  2.72[H]    Ca    9.6      12 Apr 2025 11:36    TPro  8.4[H]  /  Alb  3.8  /  TBili  1.3[H]  /  DBili  x   /  AST  14[L]  /  ALT  21  /  AlkPhos  45  04-12              Urinalysis Basic - ( 12 Apr 2025 11:36 )    Color: x / Appearance: x / SG: x / pH: x  Gluc: 200 mg/dL / Ketone: x  / Bili: x / Urobili: x   Blood: x / Protein: x / Nitrite: x   Leuk Esterase: x / RBC: x / WBC x   Sq Epi: x / Non Sq Epi: x / Bacteria: x    RADIOLOGY & ADDITIONAL STUDIES:  < from: CT Abdomen and Pelvis w/ IV Cont (04.12.25 @ 12:29) >    ACC: [id number] EXAM:  CT ABDOMEN AND PELVIS IC   ORDERED BY: STEPHEN HAYNES     PROCEDURE DATE:  04/12/2025          INTERPRETATION:  CLINICAL INFORMATION: Difficulty urinating. Recent   cystoscopy and stone removal.    COMPARISON: Renal ultrasound 3/17/2025, CT abdomen/pelvis 6/6/2022    CONTRAST/COMPLICATIONS:  IV Contrast: Omnipaque 350  90 cc administered   0 cc discarded  Oral Contrast: NONE  .    PROCEDURE:  CT of the Abdomen and Pelvis was performed.  Sagittal and coronal reformats were performed.    FINDINGS:  LOWER CHEST: Bibasilar atelectasis. Mild coronary artery calcifications.    LIVER: Within normal limits.  BILE DUCTS: Normal caliber.  GALLBLADDER: Cholelithiasis.  SPLEEN: Within normal limits.  PANCREAS: Within normal limits.  ADRENALS: Within normal limits.  KIDNEYS/URETERS: Mild left and moderate right hydroureteronephrosis.   Interval left ureteral stent extending from an interpolar calyx to the   bladder. Multiple bilateral renal stones measuring up to 6 mm on the left  and 3 mm on the right. Multiple distal right ureteral stones measuring up   to 6 mm. Moderate bilateral perinephric stranding, left greater than   right, nonspecific. Small right renal cyst.    BLADDER: Distended bladder with nondependent gas. 4 mmdependent stone in   the posterior bladder.  REPRODUCTIVE ORGANS: Prostate is not enlarged.    BOWEL: No bowel obstruction. Appendix is not visualized. No evidence of   inflammation in the pericecal region.  PERITONEUM/RETROPERITONEUM: Within normal limits.  VESSELS: Atherosclerotic changes.  LYMPH NODES: No lymphadenopathy.  ABDOMINAL WALL: Small fat-containing umbilical hernia.  BONES: Degenerative changes. Stable chronic mild L2 compression fracture.    IMPRESSION:  Multiple distal right ureteral stones measuring up to 6 mm with moderate   right hydroureteronephrosis. Additional nonobstructive right renal   calculi measuring up to 3 mm    Mild left hydroureteronephrosis with left ureteral stent in expected   location extending from an interpolar calyx to the bladder. Multiple   additional nonobstructive left renal calculi. No stones seen along the   course of the ureteral stent. 4 mm stone within the bladder.    Distended bladder.    --- End of Report ---    ERINN MARTINO MD; Attending Radiologist  This document has been electronically signed. Apr 12 2025 12:44PM    < end of copied text >

## 2025-04-12 NOTE — PATIENT PROFILE ADULT - FUNCTIONAL ASSESSMENT - BASIC MOBILITY 6.
4-calculated by average/Not able to assess (calculate score using [facility] averaging method)

## 2025-04-12 NOTE — ED STATDOCS - NSICDXFAMILYHX_GEN_ALL_CORE_FT
FAMILY HISTORY:  Father  Still living? Unknown  FH: CAD (coronary artery disease), Age at diagnosis: Age Unknown    Mother  Still living? Unknown  FH: Alzheimers disease, Age at diagnosis: Age Unknown    Sibling  Still living? Yes, Estimated age: 71-80  Family history of malignant lymphatic neoplasm, Age at diagnosis: Age Unknown  FH: type 2 diabetes, Age at diagnosis: Age Unknown

## 2025-04-12 NOTE — ED STATDOCS - CARE PLAN
1 Principal Discharge DX:	Acute UTI  Secondary Diagnosis:	Sepsis  Secondary Diagnosis:	TERRY (acute kidney injury)

## 2025-04-12 NOTE — ED ADULT NURSE REASSESSMENT NOTE - NS ED NURSE REASSESS COMMENT FT1
MD Worthy made aware pt did not receive initial dose of zosyn that infuses over 30min, states okay to give dose of zosyn over 4hrs as ordered without receiving initial dose.

## 2025-04-12 NOTE — CONSULT NOTE ADULT - ASSESSMENT
69y male w/ a PMHx of CMML, HLD, DM II, kidney stones s/p ureteroscopy with laser lithotripsy and stent placement 4/11 with Dr. Olivier presenting in urinary retention  22fr 3-way rosenberg placed - urine with orange tint likely from pyridium.   CT findings consistent with bilateral hydroureteronephrosis with multiple right ureteral stones and L ureteral stent in place  Pt with elevated WBC and TERRY     Hydroureteronephrosis can occur due to urinary retention vs obstructive ureteral stone. Recommend admission for IV abx and observation.  Will obtain US Kidney bladder tomorrow in AM. If hydronephrosis and TERRY does not improve patient may require R ureteral stent placement.  AM labs   NPO after midnight for possible procedure  Continue rosenberg catheter - off CBI unless hematuria returns  Case discussed with Dr. Shaw

## 2025-04-12 NOTE — PATIENT PROFILE ADULT - NS PRO AD NO ADVANCE DIRECTIVE
No

## 2025-04-12 NOTE — ED STATDOCS - CLINICAL SUMMARY MEDICAL DECISION MAKING FREE TEXT BOX
69y male p/w difficulty urinating and abdominal pain. Patient had a ureteroscopy with laser lithotripsy and stent placement s/p L sided kidney stone done yesterday w/ Dr. Olivier. Will r/o UTI vs post-surgical complications vs urinary retention. Plan for labs, CT scans, medications and re-evaluate. 69y male p/w difficulty urinating and abdominal pain. Patient had a ureteroscopy with laser lithotripsy and stent placement s/p L sided kidney stone done yesterday w/ Dr. Olivier. Will r/o UTI vs post-surgical complications vs urinary retention/obstruction. Plan for labs, CT scans, pain control, urinary cath and re-evaluate. urology eval.

## 2025-04-12 NOTE — ED STATDOCS - PROGRESS NOTE DETAILS
69-year-old male with past medical history of of diabetes type 2 chronic myelomonocytic leukemia, hypothyroidism hyperlipidemia bladder stone and kidney stones presents to the ED complaining of difficulty urinating since 7 AM.  Associated with abdominal pain.  Patient had a ureteroscopy yesterday with laser lithotripsy to remove a bladder stone and stent was placed in left sided ureter with Dr. Olivier.  Patient reports dribbling bloody urine since 7 AM today.  No fevers chills nausea or vomiting.  Patient going for labs and CT imaging.  Nursing reports possible stricture at urethral meatus when attempting to place Adkins catheter.  I visualized meatus with definite closure of area with surrounding bruising and mild bleeding.  I called urology PA to come assess patient in the ED.  Skylar Fonscea PA-C Patient's white blood cell count was elevated to 36.74 with evidence of stable hemoglobin and hematocrit.  Sodium mildly decreased to 130 potassium 3.8 BUN elevated to 31 from 17 on April 4 P.  Creatinine elevated to 2.72 today from 1.34 on April 4.  Glucose elevated to 200.  Lactate elevated to 3.9.  Patient was reevaluated patient denies a history of heart failure.  Patient denies taking any diuretics.  Patient was given 500 mL of IV fluid will add additional fluids for sepsis criteria.  Patient started on IV Rocephin.  Urology placed Adkins catheter and with evidence of new right-sided moderate hydroureteronephrosis they recommend admission to medicine and they will follow.  Skylar Fonseca PA-C

## 2025-04-12 NOTE — H&P ADULT - HISTORY OF PRESENT ILLNESS
"69y male w/ a PMHx of CMML, HLD, DM II who presents to the ED c/o difficulty urinating and abdominal pain. Patients/p ureteroscopy with laser lithotripsy and stent placement 4/11 with Dr. Olivier. Patient reported noticing some blood in urine post procedure however was able to void. He states that overnight he was unable to void, reports minimal drops of urine that appeared dark and bloody.  Reports lower abdominal pain with urge to void. Pt was found to be in urinary retention in the ED with ~650cc on bladder scan. Urology consulted for rosenberg placement. Patient states he was found to have UTI on PST with urine Cx growing enterococcus faecalis and was given antibiotics to take pre procedure. He was prescribed pyridium postoperatively as well. Denied dysuria, fever, or chills. "    69 M with pmh NIDDM, CMML, HLD recent ureteroscopy with laser lithotripsy and stent placement 4/11 p/w difficulty urinating and discolored urine and possible hematuria. No fevers. UA++, rosenberg placed in ED . CT c/w b/l hydro R>L. Scr: 2.7. Met sepsis criteria on arrival  Pt has no other complaint to offer.   Was evaluated by urology in ED.       < from: CT Abdomen and Pelvis w/ IV Cont (04.12.25 @ 12:29) >    IMPRESSION:  Multiple distal right ureteral stones measuring up to 6 mm with moderate   right hydroureteronephrosis. Additional nonobstructive right renal   calculi measuring up to 3 mm.    Mild left hydroureteronephrosis with left ureteral stent in expected   location extending from an interpolar calyx to the bladder. Multiple   additional nonobstructive left renal calculi. No stones seen along the   course of the ureteral stent. 4 mm stone within the bladder.    Distended bladder.    < end of copied text >        PAST MEDICAL & SURGICAL HISTORY:  HLD (hyperlipidemia)  DM (diabetes mellitus), type 2  Kidney stones  Hypothyroidism  History of staph infection  lumbar spine  CMML (chronic myelomonocytic leukemia)  Bladder stone  Kidney stone  H/O sepsis  History of BPH  History of back surgery  History of lithotripsy  H/O colonoscopy

## 2025-04-13 PROBLEM — C93.10 CHRONIC MYELOMONOCYTIC LEUKEMIA NOT HAVING ACHIEVED REMISSION: Chronic | Status: ACTIVE | Noted: 2025-04-04

## 2025-04-13 PROBLEM — Z86.19 PERSONAL HISTORY OF OTHER INFECTIOUS AND PARASITIC DISEASES: Chronic | Status: ACTIVE | Noted: 2025-04-04

## 2025-04-13 LAB
ANION GAP SERPL CALC-SCNC: 7 MMOL/L — SIGNIFICANT CHANGE UP (ref 5–17)
BUN SERPL-MCNC: 25 MG/DL — HIGH (ref 7–23)
CALCIUM SERPL-MCNC: 8.7 MG/DL — SIGNIFICANT CHANGE UP (ref 8.5–10.1)
CHLORIDE SERPL-SCNC: 109 MMOL/L — HIGH (ref 96–108)
CO2 SERPL-SCNC: 20 MMOL/L — LOW (ref 22–31)
CREAT SERPL-MCNC: 1.82 MG/DL — HIGH (ref 0.5–1.3)
CULTURE RESULTS: NO GROWTH — SIGNIFICANT CHANGE UP
EGFR: 39 ML/MIN/1.73M2 — LOW
EGFR: 39 ML/MIN/1.73M2 — LOW
GLUCOSE BLDC GLUCOMTR-MCNC: 134 MG/DL — HIGH (ref 70–99)
GLUCOSE BLDC GLUCOMTR-MCNC: 147 MG/DL — HIGH (ref 70–99)
GLUCOSE BLDC GLUCOMTR-MCNC: 155 MG/DL — HIGH (ref 70–99)
GLUCOSE BLDC GLUCOMTR-MCNC: 177 MG/DL — HIGH (ref 70–99)
GLUCOSE SERPL-MCNC: 155 MG/DL — HIGH (ref 70–99)
HCT VFR BLD CALC: 28.6 % — LOW (ref 39–50)
HGB BLD-MCNC: 9.3 G/DL — LOW (ref 13–17)
MCHC RBC-ENTMCNC: 30 PG — SIGNIFICANT CHANGE UP (ref 27–34)
MCHC RBC-ENTMCNC: 32.5 G/DL — SIGNIFICANT CHANGE UP (ref 32–36)
MCV RBC AUTO: 92.3 FL — SIGNIFICANT CHANGE UP (ref 80–100)
NRBC # BLD AUTO: 0 K/UL — SIGNIFICANT CHANGE UP (ref 0–0)
NRBC # FLD: 0 K/UL — SIGNIFICANT CHANGE UP (ref 0–0)
NRBC BLD AUTO-RTO: 0 /100 WBCS — SIGNIFICANT CHANGE UP (ref 0–0)
PLATELET # BLD AUTO: 115 K/UL — LOW (ref 150–400)
PMV BLD: 12.4 FL — SIGNIFICANT CHANGE UP (ref 7–13)
POTASSIUM SERPL-MCNC: 3.7 MMOL/L — SIGNIFICANT CHANGE UP (ref 3.5–5.3)
POTASSIUM SERPL-SCNC: 3.7 MMOL/L — SIGNIFICANT CHANGE UP (ref 3.5–5.3)
RBC # BLD: 3.1 M/UL — LOW (ref 4.2–5.8)
RBC # FLD: 14.7 % — HIGH (ref 10.3–14.5)
SODIUM SERPL-SCNC: 136 MMOL/L — SIGNIFICANT CHANGE UP (ref 135–145)
SPECIMEN SOURCE: SIGNIFICANT CHANGE UP
WBC # BLD: 13.82 K/UL — HIGH (ref 3.8–10.5)
WBC # FLD AUTO: 13.82 K/UL — HIGH (ref 3.8–10.5)

## 2025-04-13 PROCEDURE — 99232 SBSQ HOSP IP/OBS MODERATE 35: CPT

## 2025-04-13 PROCEDURE — 76770 US EXAM ABDO BACK WALL COMP: CPT | Mod: 26

## 2025-04-13 PROCEDURE — 99233 SBSQ HOSP IP/OBS HIGH 50: CPT

## 2025-04-13 RX ORDER — SENNA 187 MG
2 TABLET ORAL AT BEDTIME
Refills: 0 | Status: DISCONTINUED | OUTPATIENT
Start: 2025-04-13 | End: 2025-04-15

## 2025-04-13 RX ORDER — POLYETHYLENE GLYCOL 3350 17 G/17G
17 POWDER, FOR SOLUTION ORAL DAILY
Refills: 0 | Status: DISCONTINUED | OUTPATIENT
Start: 2025-04-13 | End: 2025-04-15

## 2025-04-13 RX ORDER — TAMSULOSIN HYDROCHLORIDE 0.4 MG/1
0.8 CAPSULE ORAL AT BEDTIME
Refills: 0 | Status: DISCONTINUED | OUTPATIENT
Start: 2025-04-13 | End: 2025-04-15

## 2025-04-13 RX ADMIN — INSULIN LISPRO 1: 100 INJECTION, SOLUTION INTRAVENOUS; SUBCUTANEOUS at 08:40

## 2025-04-13 RX ADMIN — POLYETHYLENE GLYCOL 3350 17 GRAM(S): 17 POWDER, FOR SOLUTION ORAL at 16:13

## 2025-04-13 RX ADMIN — Medication 25 GRAM(S): at 21:30

## 2025-04-13 RX ADMIN — INSULIN LISPRO 1: 100 INJECTION, SOLUTION INTRAVENOUS; SUBCUTANEOUS at 18:19

## 2025-04-13 RX ADMIN — Medication 2 TABLET(S): at 21:32

## 2025-04-13 RX ADMIN — FENOFIBRATE 48 MILLIGRAM(S): 160 TABLET ORAL at 09:24

## 2025-04-13 RX ADMIN — TAMSULOSIN HYDROCHLORIDE 0.8 MILLIGRAM(S): 0.4 CAPSULE ORAL at 21:33

## 2025-04-13 RX ADMIN — Medication 81 MILLIGRAM(S): at 09:23

## 2025-04-13 RX ADMIN — Medication 25 GRAM(S): at 04:56

## 2025-04-13 RX ADMIN — ATORVASTATIN CALCIUM 10 MILLIGRAM(S): 80 TABLET, FILM COATED ORAL at 21:33

## 2025-04-13 RX ADMIN — Medication 25 GRAM(S): at 13:38

## 2025-04-14 ENCOUNTER — TRANSCRIPTION ENCOUNTER (OUTPATIENT)
Age: 70
End: 2025-04-14

## 2025-04-14 LAB
ANION GAP SERPL CALC-SCNC: 5 MMOL/L — SIGNIFICANT CHANGE UP (ref 5–17)
BUN SERPL-MCNC: 16 MG/DL — SIGNIFICANT CHANGE UP (ref 7–23)
CALCIUM SERPL-MCNC: 10 MG/DL — SIGNIFICANT CHANGE UP (ref 8.5–10.1)
CHLORIDE SERPL-SCNC: 107 MMOL/L — SIGNIFICANT CHANGE UP (ref 96–108)
CO2 SERPL-SCNC: 24 MMOL/L — SIGNIFICANT CHANGE UP (ref 22–31)
CREAT SERPL-MCNC: 1.42 MG/DL — HIGH (ref 0.5–1.3)
EGFR: 53 ML/MIN/1.73M2 — LOW
EGFR: 53 ML/MIN/1.73M2 — LOW
GLUCOSE BLDC GLUCOMTR-MCNC: 137 MG/DL — HIGH (ref 70–99)
GLUCOSE BLDC GLUCOMTR-MCNC: 155 MG/DL — HIGH (ref 70–99)
GLUCOSE BLDC GLUCOMTR-MCNC: 183 MG/DL — HIGH (ref 70–99)
GLUCOSE SERPL-MCNC: 144 MG/DL — HIGH (ref 70–99)
HCT VFR BLD CALC: 29.5 % — LOW (ref 39–50)
HGB BLD-MCNC: 9.9 G/DL — LOW (ref 13–17)
MCHC RBC-ENTMCNC: 30.6 PG — SIGNIFICANT CHANGE UP (ref 27–34)
MCHC RBC-ENTMCNC: 33.6 G/DL — SIGNIFICANT CHANGE UP (ref 32–36)
MCV RBC AUTO: 91 FL — SIGNIFICANT CHANGE UP (ref 80–100)
NRBC # BLD AUTO: 0 K/UL — SIGNIFICANT CHANGE UP (ref 0–0)
NRBC # FLD: 0 K/UL — SIGNIFICANT CHANGE UP (ref 0–0)
NRBC BLD AUTO-RTO: 0 /100 WBCS — SIGNIFICANT CHANGE UP (ref 0–0)
PLATELET # BLD AUTO: 110 K/UL — LOW (ref 150–400)
PMV BLD: 12.6 FL — SIGNIFICANT CHANGE UP (ref 7–13)
POTASSIUM SERPL-MCNC: 3.6 MMOL/L — SIGNIFICANT CHANGE UP (ref 3.5–5.3)
POTASSIUM SERPL-SCNC: 3.6 MMOL/L — SIGNIFICANT CHANGE UP (ref 3.5–5.3)
RBC # BLD: 3.24 M/UL — LOW (ref 4.2–5.8)
RBC # FLD: 14.4 % — SIGNIFICANT CHANGE UP (ref 10.3–14.5)
SODIUM SERPL-SCNC: 136 MMOL/L — SIGNIFICANT CHANGE UP (ref 135–145)
SURGICAL PATHOLOGY STUDY: SIGNIFICANT CHANGE UP
WBC # BLD: 9.13 K/UL — SIGNIFICANT CHANGE UP (ref 3.8–10.5)
WBC # FLD AUTO: 9.13 K/UL — SIGNIFICANT CHANGE UP (ref 3.8–10.5)

## 2025-04-14 PROCEDURE — 99233 SBSQ HOSP IP/OBS HIGH 50: CPT

## 2025-04-14 RX ORDER — OXYCODONE HYDROCHLORIDE AND ACETAMINOPHEN 10; 325 MG/1; MG/1
1 TABLET ORAL
Qty: 10 | Refills: 0
Start: 2025-04-14 | End: 2025-04-18

## 2025-04-14 RX ORDER — CIPROFLOXACIN HCL 250 MG
1 TABLET ORAL
Qty: 14 | Refills: 0
Start: 2025-04-14 | End: 2025-04-20

## 2025-04-14 RX ORDER — TAMSULOSIN HYDROCHLORIDE 0.4 MG/1
2 CAPSULE ORAL
Qty: 60 | Refills: 0
Start: 2025-04-14 | End: 2025-05-13

## 2025-04-14 RX ORDER — L.ACID/B.ANIMALIS,BIFI,INF,LON 3B CELL
1 CAPSULE ORAL
Refills: 0 | DISCHARGE

## 2025-04-14 RX ADMIN — FENOFIBRATE 48 MILLIGRAM(S): 160 TABLET ORAL at 09:05

## 2025-04-14 RX ADMIN — ATORVASTATIN CALCIUM 10 MILLIGRAM(S): 80 TABLET, FILM COATED ORAL at 21:11

## 2025-04-14 RX ADMIN — Medication 3 MILLIGRAM(S): at 21:09

## 2025-04-14 RX ADMIN — TAMSULOSIN HYDROCHLORIDE 0.8 MILLIGRAM(S): 0.4 CAPSULE ORAL at 21:10

## 2025-04-14 RX ADMIN — Medication 25 GRAM(S): at 05:13

## 2025-04-14 RX ADMIN — Medication 650 MILLIGRAM(S): at 18:08

## 2025-04-14 RX ADMIN — OXYBUTYNIN CHLORIDE 5 MILLIGRAM(S): 5 TABLET, FILM COATED, EXTENDED RELEASE ORAL at 17:46

## 2025-04-14 RX ADMIN — INSULIN LISPRO 1: 100 INJECTION, SOLUTION INTRAVENOUS; SUBCUTANEOUS at 13:31

## 2025-04-14 RX ADMIN — Medication 2 TABLET(S): at 21:10

## 2025-04-14 RX ADMIN — Medication 650 MILLIGRAM(S): at 17:38

## 2025-04-14 RX ADMIN — Medication 1 TABLET(S): at 09:04

## 2025-04-14 RX ADMIN — INSULIN LISPRO 1: 100 INJECTION, SOLUTION INTRAVENOUS; SUBCUTANEOUS at 09:03

## 2025-04-14 RX ADMIN — Medication 25 GRAM(S): at 21:09

## 2025-04-14 RX ADMIN — Medication 112 MICROGRAM(S): at 05:13

## 2025-04-14 RX ADMIN — POLYETHYLENE GLYCOL 3350 17 GRAM(S): 17 POWDER, FOR SOLUTION ORAL at 09:05

## 2025-04-14 RX ADMIN — Medication 25 GRAM(S): at 13:31

## 2025-04-14 RX ADMIN — Medication 81 MILLIGRAM(S): at 09:04

## 2025-04-14 NOTE — DISCHARGE NOTE PROVIDER - NSDCFUADDAPPT_GEN_ALL_CORE_FT
APPTS ARE READY TO BE MADE: [X] YES    Best Family or Patient Contact (if needed):    Additional Information about above appointments (if needed):    1:Dr. Georges - 2 weeks  2:Dr. Olivier - 1 week   3:  APPTS ARE READY TO BE MADE: [X] YES    Best Family or Patient Contact (if needed):    Additional Information about above appointments (if needed):    1:Dr. Georges - 2 weeks  2:Dr. Olivier - 1 week   3:       URO - Met with patient face to face and provided provider referral information, however they are already scheduled and do not need assistance at this time.   4/21/25 at 2:20pm with Dr. Luis A Olivier, [address]    PCP - Met with patient face to face and provided the patient with provider referral information, however patient prefers to schedule the appointments on their own.

## 2025-04-14 NOTE — DISCHARGE NOTE PROVIDER - NSDCMRMEDTOKEN_GEN_ALL_CORE_FT
Aspirin Enteric Coated 81 mg oral delayed release tablet: 1 tab(s) orally once a day  atorvastatin 10 mg oral tablet: 1 tab(s) orally once a day  ciprofloxacin 500 mg oral tablet: 1 tab(s) orally 2 times a day  fenofibrate 48 mg oral tablet: 1 tab(s) orally once a day  metFORMIN 500 mg oral tablet, extended release: 1 tab(s) orally 2 times a day  oxyBUTYnin 5 mg oral tablet: 1 tab(s) orally 3 times a day as needed for urinary urgency  Ozempic 2 mg/3 mL (0.25 mg or 0.5 mg dose) subcutaneous solution: 0.5 milligram(s) subcutaneously once a week  Synthroid 112 mcg (0.112 mg) oral tablet: 1 tab(s) orally  tamsulosin 0.4 mg oral capsule: 2 cap(s) orally once a day (at bedtime)  traMADol 50 mg oral tablet: 1 tab(s) orally 4 times a day as needed for   Aspirin Enteric Coated 81 mg oral delayed release tablet: 1 tab(s) orally once a day  atorvastatin 10 mg oral tablet: 1 tab(s) orally once a day  ciprofloxacin 500 mg oral tablet: 1 tab(s) orally 2 times a day  fenofibrate 48 mg oral tablet: 1 tab(s) orally once a day  metFORMIN 500 mg oral tablet, extended release: 1 tab(s) orally 2 times a day  oxyBUTYnin 5 mg oral tablet: 1 tab(s) orally 3 times a day as needed for urinary urgency  Ozempic 2 mg/3 mL (0.25 mg or 0.5 mg dose) subcutaneous solution: 0.5 milligram(s) subcutaneously once a week  Percocet 5 mg-325 mg oral tablet: 1 tab(s) orally 2 times a day MDD: 10  Synthroid 112 mcg (0.112 mg) oral tablet: 1 tab(s) orally  tamsulosin 0.4 mg oral capsule: 2 cap(s) orally once a day (at bedtime)  traMADol 50 mg oral tablet: 1 tab(s) orally 4 times a day as needed for

## 2025-04-14 NOTE — DISCHARGE NOTE PROVIDER - CARE PROVIDER_API CALL
Dane Georges  Internal Medicine  [address] [place] [address]  Phone: [phone]  Fax: [phone]  Follow Up Time: 2 weeks    Luis A Olivier  Urology  [address] [facility] [address]  [place] [address]  Phone: [phone]  Fax: [phone]  Follow Up Time: 1 week

## 2025-04-14 NOTE — CDI QUERY NOTE - NSCDIOTHERTXTBX2_GEN_ALL_CORE_FT
Clinical documentation and/or evidence of the patient’s presentation, evaluation, and medical management, as evidenced below, may support a cause and effect link that is not documented in the medical record.  In order to ensure accurate coding and accuracy of the clinical record, the documentation in this patient’s medical record requires additional clarification.      If you think the supporting documentation and/or clinical evidence supports a cause and effect link, please include more specific documentation associated with these findings in your progress note and/or discharge summary.    Please clarify if there is a relationship between the UTI and recent ureteroscopy, such as:    •	UTI secondary to the recent ureteroscopy  •	UTI unrelated to recent ureteroscopy  •	Other (specify)    Supporting documentation and/or clinical evidence:    ED STATDocs 4/12/2025   Patient had a ureteroscopy with laser lithotripsy and stent placement s/p L sided kidney stone done yesterday w/ Dr. Olivier.   Secondary Diagnosis:	Sepsis    Urology Consult 4/12/2025   CT findings were consistent with multiple distal right ureteral stones measuring up to 6 mm with moderate right hydroureteronephrosis and mild left hydroureteronephrosis with left ureteral stent in expected location extending from an interpolar calyx to the bladder.  Hydroureteronephrosis can occur due to urinary retention vs obstructive ureteral stone. Recommend admission for IV abx and observation.    HP Adult 4/12/2025   Complicated uti/suspect early pyelo  recent ureteroscopy with laser lithotripsy and stent placement 4/11 p/w difficulty urinating and discolored urine and possible hematuria. No fevers. UA++, rosenberg placed in ED . CT c/w b/l hydro R>L.    Discharge provider note 4/14/2025  PRINCIPAL DISCHARGE DIAGNOSIS: UTI  SECONDARY DISCHARGE DIAGNOSES: Sepsis

## 2025-04-14 NOTE — CDI QUERY NOTE - NSCDIOTHERTXTBX_GEN_ALL_CORE_HH
Although sepsis is documented in the medical record, it lacks the specific indicators to clinically substantiate and support the diagnosis.     In order to ensure accurate coding and accuracy of the clinical record, the documentation in this patient’s medical record requires additional clarification of the diagnosis.    For reference, please see the [facility] sepsis ED sepsis/severe sepsis management algorithm  located on the [facility] Intranet     Please clarify the status of sepsis in your progress note and/or discharge summary:    •	Sepsis ruled out after study  •	Sepsis ruled in (please document additional supporting information or mitigating factors to support this diagnosis)  •	Other (specify)    Supporting documentation:    ED STATDocs 4/12/2025   Patient had a ureteroscopy with laser lithotripsy and stent placement s/p L sided kidney stone done yesterday w/ Dr. Olivier.   Secondary Diagnosis:	Sepsis    HP Adult 4/12/2025   T(F): 98.6 (12 Apr 2025 15:08), Max: 98.6 (12 Apr 2025 15:08)  HR: 65 (12 Apr 2025 15:08) (64 - 77)  RR: 16 (12 Apr 2025 15:08) (16 - 18)  WBC: 36.74   Sepsis - poa  Complicated uti/suspect early pyelo    Discharge provider note 4/14/2025  PRINCIPAL DISCHARGE DIAGNOSIS: UTI  SECONDARY DISCHARGE DIAGNOSES: Sepsis

## 2025-04-14 NOTE — DISCHARGE NOTE PROVIDER - ATTENDING DISCHARGE PHYSICAL EXAMINATION:
General: No distress, No anxiety          Skin     : No jaundice, No lesions, No swelling  HEENT: Normocephalic, no icterus , EOM full , No epistaxis  Cardio: RRR  Lung    : No resp distress  Abdo:   : Soft, Non tender, No guarding, No distension   Back    : No CVAT b/l  Extremity: No calf tenderness   Genitalia Male: rosenberg with ronni urine  Neuro   : A&Ox3

## 2025-04-14 NOTE — DISCHARGE NOTE NURSING/CASE MANAGEMENT/SOCIAL WORK - PATIENT PORTAL LINK FT
You can access the FollowMyHealth Patient Portal offered by [facility] by registering at the following website: [email or web address]. By joining [employer]’s FollowMyHealth portal, you will also be able to view your health information using other applications (apps) compatible with our system.

## 2025-04-14 NOTE — DISCHARGE NOTE PROVIDER - NSDCHHNEEDSERVICE_GEN_ALL_CORE
Catheter insertion/Medication teaching and assessment/Observation and assessment IMPROVE-DD Assessment completed: Jan 5 2022 11:43AM

## 2025-04-14 NOTE — DISCHARGE NOTE PROVIDER - NSDCPNSUBOBJ_GEN_ALL_CORE
#Sepsis - poa  #Complicated uti/suspect early pyelo  #Acute kidney injury/Mod right hydronephrosis  - admit to medicine  - reviewed prior culture data  - c/w zosyn   - f/u blood and urine cultures  - Lactate 3.9   - s/p fluids   - Hold pyridium with seferino  - cont bethanecol prn  - Kidney sono 4/13 showed no hydro   - leukocytosis now resolved after two days iv abx switch to cipro 500 mg twice a day for 7 days   - dc with rosenberg and outpatient urology follow up   - DVT px: SCD

## 2025-04-14 NOTE — DISCHARGE NOTE PROVIDER - HOSPITAL COURSE
69y male w/ a PMHx of CMML, HLD, DM II who presents to the ED c/o difficulty urinating and abdominal pain. Patients/p ureteroscopy with laser lithotripsy and stent placement 4/11 with Dr. Olivier. Patient reported noticing some blood in urine post procedure however was able to void. He states that overnight he was unable to void, reports minimal drops of urine that appeared dark and bloody.  Reports lower abdominal pain with urge to void. Pt was found to be in urinary retention in the ED with ~650cc on bladder scan. Urology consulted for rosenberg placement. Patient states he was found to have UTI on PST with urine Cx growing enterococcus faecalis and was given antibiotics to take pre procedure. He was prescribed pyridium postoperatively as well. Denied dysuria, fever, or chills.     69 M with pmh NIDDM, CMML, HLD recent ureteroscopy with laser lithotripsy and stent placement 4/11 p/w difficulty urinating and discolored urine and possible hematuria. No fevers. UA++, rosenberg placed in ED . CT c/w b/l hydro R>L. Scr: 2.7. Met sepsis criteria on arrival  Pt has no other complaint to offer.   Was evaluated by urology in ED. Found to have hydro rosenberg was placed. US after rosenberg showed resolution of hydro. Patient received iv zosyn while admitted with resolution of leukocytosis. Patient cleared for discharge with rosenberg. Patient is to continue ciprofloxacin 500 mg q12 for 7 days and follow up with urology for TOV in the outpatient setting.

## 2025-04-14 NOTE — DISCHARGE NOTE PROVIDER - PROVIDER TOKENS
PROVIDER:[TOKEN:[[id number]],FOLLOWUP:[2 weeks]],PROVIDER:[TOKEN:[[id number]],FOLLOWUP:[1 week]]

## 2025-04-14 NOTE — DISCHARGE NOTE NURSING/CASE MANAGEMENT/SOCIAL WORK - FINANCIAL ASSISTANCE
[facility] provides services at a reduced cost to those who are determined to be eligible through [facility]’s financial assistance program. Information regarding [facility]’s financial assistance program can be found by going to [email or web address] or by calling [phone].

## 2025-04-14 NOTE — DISCHARGE NOTE PROVIDER - NSDCFUSCHEDAPPT_GEN_ALL_CORE_FT
[facility]  UROLOGY [address]  Scheduled Appointment: 04/21/2025    Luis A Olivier  [facility]  UROLOGY [address]  Scheduled Appointment: 04/21/2025    Jovanna Nguyen  [facility]  [place] [facility]  Scheduled Appointment: 06/06/2025    Evangelina Dominguez  [facility]  ENDOCRIN [address]  Scheduled Appointment: 06/27/2025    [facility]  OTOLARYNG [address]  Scheduled Appointment: 07/09/2025    [facility]  DISPENSARY [address]  Scheduled Appointment: 07/09/2025     [facility]  UROLOGY [address]  Scheduled Appointment: 04/15/2025    Júnior Shaw  [facility]  UROLOGY [address]  Scheduled Appointment: 04/15/2025    [facility]  UROLOGY [address]  Scheduled Appointment: 04/21/2025    Luis A Olivier  [facility]  UROLOGY [address]  Scheduled Appointment: 04/21/2025    Jovanna Nguyen  [facility]  [place] [facility]  Scheduled Appointment: 06/06/2025    Evangelina Dominguez  [facility]  ENDOCRIN [address]  Scheduled Appointment: 06/27/2025    [facility]  OTOLARYNG [address]  Scheduled Appointment: 07/09/2025    [facility]  DISPENSARY [address]  Scheduled Appointment: 07/09/2025     [facility]  UROLOGY [address]  Scheduled Appointment: 04/21/2025    Luis A Olivier  [facility]  UROLOGY [address]  Scheduled Appointment: 04/21/2025    Jovanna Nguyen  [facility]  [place] [facility]  Scheduled Appointment: 06/06/2025    Evangelina Dominguez  [facility]  ENDOCRIN [address]  Scheduled Appointment: 06/27/2025    [facility]  OTOLARYNG [address]  Scheduled Appointment: 07/09/2025    [facility]  DISPENSARY [address]  Scheduled Appointment: 07/09/2025

## 2025-04-14 NOTE — DISCHARGE NOTE PROVIDER - NSDCCPCAREPLAN_GEN_ALL_CORE_FT
PRINCIPAL DISCHARGE DIAGNOSIS  Diagnosis: Acute UTI  Assessment and Plan of Treatment: WHAT IS A URINARY TRACT INFECTION? A urinary tract infection (UTI) is caused by bacteria that gets inside your urinary tract. You urinary tract includes your kidneys, ureters, and bladder.  Please continue to take ciprofloxacin 500 mg twice a day for 7 days and increase your flomax from 0.4 mg to 0.8 mg. Please follow up with urology on Tuesday.  THINGS TO DO: (1) Urinate when you feel the urge – do not hold your urine (2) Wear cotton underwear to prevent the trapping of moisture (3) Women should wipe front to back to prevent the translocation of germs  MONITOR THESE SIGNS AND SYMPTOMS: (1) Urinating very little or not at all (2) Vomiting (3) Fever > 100.4. If you experience any of these, DO alert your primary care provider, or return to the Emergency Department if you feel very sick.        SECONDARY DISCHARGE DIAGNOSES  Diagnosis: Sepsis  Assessment and Plan of Treatment:     Diagnosis: TERRY (acute kidney injury)  Assessment and Plan of Treatment:      PRINCIPAL DISCHARGE DIAGNOSIS  Diagnosis: Acute UTI  Assessment and Plan of Treatment: WHAT IS A URINARY TRACT INFECTION? A urinary tract infection (UTI) is caused by bacteria that gets inside your urinary tract. You urinary tract includes your kidneys, ureters, and bladder.  Please continue to take ciprofloxacin 500 mg twice a day for 7 days and increase your flomax from 0.4 mg to 0.8 mg. Please follow up with urology on Tuesday. As for rosenberg care no flushes no changes patient will follow up with urology.   THINGS TO DO: (1) Urinate when you feel the urge – do not hold your urine (2) Wear cotton underwear to prevent the trapping of moisture (3) Women should wipe front to back to prevent the translocation of germs  MONITOR THESE SIGNS AND SYMPTOMS: (1) Urinating very little or not at all (2) Vomiting (3) Fever > 100.4. If you experience any of these, DO alert your primary care provider, or return to the Emergency Department if you feel very sick.        SECONDARY DISCHARGE DIAGNOSES  Diagnosis: Sepsis  Assessment and Plan of Treatment:     Diagnosis: TERRY (acute kidney injury)  Assessment and Plan of Treatment:      PRINCIPAL DISCHARGE DIAGNOSIS  Diagnosis: Acute UTI  Assessment and Plan of Treatment: WHAT IS A URINARY TRACT INFECTION? A urinary tract infection (UTI) is caused by bacteria that gets inside your urinary tract. You urinary tract includes your kidneys, ureters, and bladder.  Please continue to take ciprofloxacin 500 mg twice a day for 7 days and increase your flomax from 0.4 mg to 0.8 mg. Please follow up with urology. As for rosenberg care no flushes no changes patient will follow up with urology.   THINGS TO DO: (1) Urinate when you feel the urge – do not hold your urine (2) Wear cotton underwear to prevent the trapping of moisture (3) Women should wipe front to back to prevent the translocation of germs  MONITOR THESE SIGNS AND SYMPTOMS: (1) Urinating very little or not at all (2) Vomiting (3) Fever > 100.4. If you experience any of these, DO alert your primary care provider, or return to the Emergency Department if you feel very sick.        SECONDARY DISCHARGE DIAGNOSES  Diagnosis: Sepsis  Assessment and Plan of Treatment:     Diagnosis: TERRY (acute kidney injury)  Assessment and Plan of Treatment:      PRINCIPAL DISCHARGE DIAGNOSIS  Diagnosis: Acute UTI  Assessment and Plan of Treatment: WHAT IS A URINARY TRACT INFECTION? A urinary tract infection (UTI) is caused by bacteria that gets inside your urinary tract. You urinary tract includes your kidneys, ureters, and bladder.  Please continue to take ciprofloxacin 500 mg twice a day for 7 days and increase your flomax from 0.4 mg to 0.8 mg. Please follow up with urology. As for rosenberg care no flushes no changes patient will follow up with urology. Patient should follow up with Dr. Shaw [phone] as outpatient.  THINGS TO DO: (1) Urinate when you feel the urge – do not hold your urine (2) Wear cotton underwear to prevent the trapping of moisture (3) Women should wipe front to back to prevent the translocation of germs  MONITOR THESE SIGNS AND SYMPTOMS: (1) Urinating very little or not at all (2) Vomiting (3) Fever > 100.4. If you experience any of these, DO alert your primary care provider, or return to the Emergency Department if you feel very sick.        SECONDARY DISCHARGE DIAGNOSES  Diagnosis: Sepsis  Assessment and Plan of Treatment:     Diagnosis: TERRY (acute kidney injury)  Assessment and Plan of Treatment:

## 2025-04-15 ENCOUNTER — APPOINTMENT (OUTPATIENT)
Dept: UROLOGY | Facility: CLINIC | Age: 70
End: 2025-04-15

## 2025-04-15 VITALS
TEMPERATURE: 99 F | OXYGEN SATURATION: 95 % | SYSTOLIC BLOOD PRESSURE: 123 MMHG | HEART RATE: 69 BPM | DIASTOLIC BLOOD PRESSURE: 74 MMHG | RESPIRATION RATE: 17 BRPM

## 2025-04-15 DIAGNOSIS — Z79.85 LONG-TERM (CURRENT) USE OF INJECTABLE NON-INSULIN ANTIDIABETIC DRUGS: ICD-10-CM

## 2025-04-15 DIAGNOSIS — Z85.6 PERSONAL HISTORY OF LEUKEMIA: ICD-10-CM

## 2025-04-15 DIAGNOSIS — E03.9 HYPOTHYROIDISM, UNSPECIFIED: ICD-10-CM

## 2025-04-15 DIAGNOSIS — N35.912 UNSPECIFIED BULBOUS URETHRAL STRICTURE, MALE: ICD-10-CM

## 2025-04-15 DIAGNOSIS — N20.0 CALCULUS OF KIDNEY: ICD-10-CM

## 2025-04-15 DIAGNOSIS — D64.9 ANEMIA, UNSPECIFIED: ICD-10-CM

## 2025-04-15 DIAGNOSIS — N13.1 HYDRONEPHROSIS WITH URETERAL STRICTURE, NOT ELSEWHERE CLASSIFIED: ICD-10-CM

## 2025-04-15 DIAGNOSIS — Z87.891 PERSONAL HISTORY OF NICOTINE DEPENDENCE: ICD-10-CM

## 2025-04-15 DIAGNOSIS — Z79.890 HORMONE REPLACEMENT THERAPY: ICD-10-CM

## 2025-04-15 DIAGNOSIS — E11.9 TYPE 2 DIABETES MELLITUS WITHOUT COMPLICATIONS: ICD-10-CM

## 2025-04-15 DIAGNOSIS — N21.0 CALCULUS IN BLADDER: ICD-10-CM

## 2025-04-15 DIAGNOSIS — Z88.0 ALLERGY STATUS TO PENICILLIN: ICD-10-CM

## 2025-04-15 DIAGNOSIS — N40.0 BENIGN PROSTATIC HYPERPLASIA WITHOUT LOWER URINARY TRACT SYMPTOMS: ICD-10-CM

## 2025-04-15 DIAGNOSIS — Z79.82 LONG TERM (CURRENT) USE OF ASPIRIN: ICD-10-CM

## 2025-04-15 DIAGNOSIS — E78.5 HYPERLIPIDEMIA, UNSPECIFIED: ICD-10-CM

## 2025-04-15 DIAGNOSIS — N13.2 HYDRONEPHROSIS WITH RENAL AND URETERAL CALCULOUS OBSTRUCTION: ICD-10-CM

## 2025-04-15 DIAGNOSIS — Z79.84 LONG TERM (CURRENT) USE OF ORAL HYPOGLYCEMIC DRUGS: ICD-10-CM

## 2025-04-15 LAB
GLUCOSE BLDC GLUCOMTR-MCNC: 141 MG/DL — HIGH (ref 70–99)
GLUCOSE BLDC GLUCOMTR-MCNC: 158 MG/DL — HIGH (ref 70–99)

## 2025-04-15 PROCEDURE — 99239 HOSP IP/OBS DSCHRG MGMT >30: CPT

## 2025-04-15 RX ORDER — OXYBUTYNIN CHLORIDE 5 MG/1
1 TABLET, FILM COATED, EXTENDED RELEASE ORAL
Qty: 90 | Refills: 0
Start: 2025-04-15 | End: 2025-05-14

## 2025-04-15 RX ADMIN — INSULIN LISPRO 1: 100 INJECTION, SOLUTION INTRAVENOUS; SUBCUTANEOUS at 12:35

## 2025-04-15 RX ADMIN — Medication 1 TABLET(S): at 09:38

## 2025-04-15 RX ADMIN — Medication 112 MICROGRAM(S): at 05:41

## 2025-04-15 RX ADMIN — Medication 25 GRAM(S): at 15:04

## 2025-04-15 RX ADMIN — Medication 25 GRAM(S): at 05:41

## 2025-04-15 RX ADMIN — Medication 81 MILLIGRAM(S): at 09:38

## 2025-04-15 RX ADMIN — FENOFIBRATE 48 MILLIGRAM(S): 160 TABLET ORAL at 09:38

## 2025-04-15 NOTE — PROGRESS NOTE ADULT - ASSESSMENT
A/P: 69y Male w/ urinary retention    D/C rosenberg for voiding trial  Encourage po fluids  Bladder scan pt after 1st and 2nd void.  If residuals low (< 300cc), pt can be discharged from  standpoint  Pt should follow up with Dr. Shaw [phone] as outpatient  Above discussed with Dr. Shaw
  69 M with pmh NIDDM, CMML, HLD recent ureteroscopy with laser lithotripsy and stent placement 4/11 p/w difficulty urinating and discolored urine and possible hematuria. No fevers. UA++, rosenberg placed in ED . CT c/w b/l hydro R>L. Scr: 2.7. Met sepsis criteria on arrival  Pt has no other complaint to offer.   Was evaluated by urology in ED.       #Sepsis - poa  #Complicated uti/suspect early pyelo  #Acute kidney injury/Mod right hydronephrosis  - admit to medicine  - reviewed prior culture data  - c/w zosyn   - f/u blood and urine cultures  - Lactate 3.9   - s/p fluids   - Hold pyridium with seferino  - cont bethanecol prn  - Kidney sono 4/13 showed no hydro   - likley dc tomorrow on po abx after one more day of iv abx  - DVT px: SCD    #DM  - hold metformin  - insulin sliding scale  - A1C  
69 M with pmh NIDDM, CMML, HLD recent ureteroscopy with laser lithotripsy and stent placement 4/11 p/w difficulty urinating and discolored urine and possible hematuria. No fevers. UA++, rosenberg placed in ED . CT c/w b/l hydro R>L. Scr: 2.7. Met sepsis criteria on arrival  Pt has no other complaint to offer.   Was evaluated by urology in ED.       #Sepsis - poa  #Complicated uti/suspect early pyelo  #Acute kidney injury/Mod right hydronephrosis  - admit to medicine  - reviewed prior culture data  - c/w zosyn   - f/u blood and urine cultures  - Lactate 3.9   - s/p fluids   - Hold pyridium with seferino  - cont bethanecol prn  - Kidney sono 4/13 showed no hydro   - likley dc tomorrow on po abx after one more day of iv abx  - DVT px: SCD    #DM  - hold metformin  - insulin sliding scale  - A1C
70yo M history of kidney stones, BPH s/p cystolithalopaxy and left ureteroscopy yesterday 4/11 with Dr. Olivier. Now has developed acute urinary retention with TERRY and leukocytosis. Also found on Ct to have right distal ureteral stones.  Afebrile, HDS.     Acute urinary retention, BPH  - not unexpected after cystoscopy in setting of BPH  - continue tamsulosin 0.8mg daily  - maintain rosenberg for at least 3 days, plan for TOV as outpatient on Tuesday 4/15    Right ureteral stones, TERRY  - creatinine and WBC improving this AM  - f/u renal ultrasound this AM - discussed with primary urologist Dr. Olivier, if hydronephrosis is improving can plan for discharge and outpatient management of right ureteral stones as his labs are improving, vitals are stable, and he has no flank pain  - continue his outpatient ciprofloxacin for a complete 7 day course - had enterococcus in urine pre-op urine culture 4/4/25

## 2025-04-15 NOTE — PROGRESS NOTE ADULT - SUBJECTIVE AND OBJECTIVE BOX
HOSPITALIST ATTENDING PROGRESS NOTE    Chart and meds reviewed.  Patient seen and examined.    CC: urinary pain     Subjective: NAEO. US this am showed improvement. lactate still 3.9 w/ fluids. White count and hgb downtrending today will monitor one more day on iv abx likely dc tomorrow on po regimen. Case discussed with urology.     All other systems reviewed and found to be negative with the exception of what has been described above.    MEDICATIONS  (STANDING):  aspirin enteric coated 81 milliGRAM(s) Oral daily  atorvastatin 10 milliGRAM(s) Oral at bedtime  dextrose 5%. 1000 milliLiter(s) (50 mL/Hr) IV Continuous <Continuous>  dextrose 50% Injectable 25 Gram(s) IV Push once  fenofibrate Tablet 48 milliGRAM(s) Oral daily  glucagon  Injectable 1 milliGRAM(s) IntraMuscular once  insulin lispro (ADMELOG) corrective regimen sliding scale   SubCutaneous three times a day before meals  lactobacillus acidophilus 1 Tablet(s) Oral daily  levothyroxine 112 MICROGram(s) Oral daily  piperacillin/tazobactam IVPB.. 3.375 Gram(s) IV Intermittent every 8 hours  tamsulosin 0.8 milliGRAM(s) Oral at bedtime    MEDICATIONS  (PRN):  acetaminophen     Tablet .. 650 milliGRAM(s) Oral every 6 hours PRN Temp greater or equal to 38C (100.4F), Mild Pain (1 - 3)  aluminum hydroxide/magnesium hydroxide/simethicone Suspension 30 milliLiter(s) Oral every 4 hours PRN Dyspepsia  dextrose Oral Gel 15 Gram(s) Oral once PRN Blood Glucose LESS THAN 70 milliGRAM(s)/deciliter  melatonin 3 milliGRAM(s) Oral at bedtime PRN Insomnia  ondansetron Injectable 4 milliGRAM(s) IV Push every 8 hours PRN Nausea and/or Vomiting  oxybutynin 5 milliGRAM(s) Oral every 8 hours PRN urinary urgency      VITALS:  T(F): 98 (04-13-25 @ 07:48), Max: 98.8 (04-12-25 @ 16:59)  HR: 74 (04-13-25 @ 07:48) (65 - 81)  BP: 114/66 (04-13-25 @ 07:48) (106/58 - 142/70)  RR: 16 (04-13-25 @ 07:48) (16 - 16)  SpO2: 99% (04-13-25 @ 07:48) (95% - 100%)  Wt(kg): --    I&O's Summary    12 Apr 2025 07:01  -  13 Apr 2025 07:00  --------------------------------------------------------  IN: 0 mL / OUT: 6725 mL / NET: -6725 mL        CAPILLARY BLOOD GLUCOSE      POCT Blood Glucose.: 134 mg/dL (13 Apr 2025 12:57)  POCT Blood Glucose.: 155 mg/dL (13 Apr 2025 07:58)  POCT Blood Glucose.: 160 mg/dL (12 Apr 2025 20:54)      PHYSICAL EXAM:  Constitutional: NAD, awake and alert  HEENT: PERR, EOMI, Normal Hearing, MMM  Neck: Soft and supple, No LAD, No JVD  Respiratory: Breath sounds are clear bilaterally, No wheezing, rales or rhonchi  Cardiovascular: S1 and S2, regular rate and rhythm, no Murmurs, gallops or rubs  Gastrointestinal: Bowel Sounds present, soft, nontender, nondistended, no guarding, no rebound  : no flank tenderness; Adkins with orange tinged urine in collection bag, no blood or clots  Extremities: No peripheral edema  Vascular: 2+ peripheral pulses  Neurological: A/O x 3, no focal deficits  Musculoskeletal: 5/5 strength b/l upper and lower extremities  Skin: No rashes    LABS:                            9.3    13.82 )-----------( 115      ( 13 Apr 2025 06:27 )             28.6     04-13    136  |  109[H]  |  25[H]  ----------------------------<  155[H]  3.7   |  20[L]  |  1.82[H]    Ca    8.7      13 Apr 2025 06:27    TPro  8.4[H]  /  Alb  3.8  /  TBili  1.3[H]  /  DBili  x   /  AST  14[L]  /  ALT  21  /  AlkPhos  45  04-12        LIVER FUNCTIONS - ( 12 Apr 2025 11:36 )  Alb: 3.8 g/dL / Pro: 8.4 gm/dL / ALK PHOS: 45 U/L / ALT: 21 U/L / AST: 14 U/L / GGT: x             Urinalysis Basic - ( 13 Apr 2025 06:27 )    Color: x / Appearance: x / SG: x / pH: x  Gluc: 155 mg/dL / Ketone: x  / Bili: x / Urobili: x   Blood: x / Protein: x / Nitrite: x   Leuk Esterase: x / RBC: x / WBC x   Sq Epi: x / Non Sq Epi: x / Bacteria: x        Lactate, Blood: 3.9 mmol/L (04-12 @ 16:58)        CULTURES:      Additional results/Imaging, I have personally reviewed:    Telemetry, personally reviewed:
HOSPITALIST ATTENDING PROGRESS NOTE    Chart and meds reviewed.  Patient seen and examined.    Subjective: Pt planned for discharge but with acute pain while passing stone remained admitted for pain control     All other systems reviewed and found to be negative with the exception of what has been described above.    MEDICATIONS  (STANDING):  aspirin enteric coated 81 milliGRAM(s) Oral daily  atorvastatin 10 milliGRAM(s) Oral at bedtime  dextrose 5%. 1000 milliLiter(s) (50 mL/Hr) IV Continuous <Continuous>  dextrose 50% Injectable 25 Gram(s) IV Push once  fenofibrate Tablet 48 milliGRAM(s) Oral daily  glucagon  Injectable 1 milliGRAM(s) IntraMuscular once  insulin lispro (ADMELOG) corrective regimen sliding scale   SubCutaneous three times a day before meals  lactobacillus acidophilus 1 Tablet(s) Oral daily  levothyroxine 112 MICROGram(s) Oral daily  piperacillin/tazobactam IVPB.. 3.375 Gram(s) IV Intermittent every 8 hours  polyethylene glycol 3350 17 Gram(s) Oral daily  senna 2 Tablet(s) Oral at bedtime  tamsulosin 0.8 milliGRAM(s) Oral at bedtime    MEDICATIONS  (PRN):  acetaminophen     Tablet .. 650 milliGRAM(s) Oral every 6 hours PRN Temp greater or equal to 38C (100.4F), Mild Pain (1 - 3)  aluminum hydroxide/magnesium hydroxide/simethicone Suspension 30 milliLiter(s) Oral every 4 hours PRN Dyspepsia  dextrose Oral Gel 15 Gram(s) Oral once PRN Blood Glucose LESS THAN 70 milliGRAM(s)/deciliter  melatonin 3 milliGRAM(s) Oral at bedtime PRN Insomnia  morphine  - Injectable 2 milliGRAM(s) IV Push every 6 hours PRN Severe Pain (7 - 10)  ondansetron Injectable 4 milliGRAM(s) IV Push every 8 hours PRN Nausea and/or Vomiting  oxybutynin 5 milliGRAM(s) Oral every 8 hours PRN urinary urgency      VITALS:  T(F): 97.8 (04-15-25 @ 15:58), Max: 98.3 (04-14-25 @ 17:46)  HR: 85 (04-15-25 @ 15:58) (58 - 85)  BP: 146/85 (04-15-25 @ 15:58) (119/67 - 146/85)  RR: 18 (04-15-25 @ 15:58) (18 - 18)  SpO2: 96% (04-15-25 @ 15:58) (96% - 99%)  Wt(kg): --    I&O's Summary    14 Apr 2025 07:01  -  15 Apr 2025 07:00  --------------------------------------------------------  IN: 0 mL / OUT: 2575 mL / NET: -2575 mL        CAPILLARY BLOOD GLUCOSE      POCT Blood Glucose.: 158 mg/dL (15 Apr 2025 12:28)  POCT Blood Glucose.: 141 mg/dL (15 Apr 2025 08:12)  POCT Blood Glucose.: 137 mg/dL (14 Apr 2025 18:30)      PHYSICAL EXAM:  Constitutional: NAD, awake and alert  HEENT: PERR, EOMI, Normal Hearing, MMM  Neck: Soft and supple, No LAD, No JVD  Respiratory: Breath sounds are clear bilaterally, No wheezing, rales or rhonchi  Cardiovascular: S1 and S2, regular rate and rhythm, no Murmurs, gallops or rubs  Gastrointestinal: Bowel Sounds present, soft, nontender, nondistended, no guarding, no rebound  : no flank tenderness; Adkins with orange tinged urine in collection bag, no blood or clots  Extremities: No peripheral edema  Vascular: 2+ peripheral pulses  Neurological: A/O x 3, no focal deficits  Musculoskeletal: 5/5 strength b/l upper and lower extremities  Skin: No rashes      LABS:                            9.9    9.13  )-----------( 110      ( 14 Apr 2025 06:32 )             29.5     04-14    136  |  107  |  16  ----------------------------<  144[H]  3.6   |  24  |  1.42[H]    Ca    10.0      14 Apr 2025 06:32              Urinalysis Basic - ( 14 Apr 2025 06:32 )    Color: x / Appearance: x / SG: x / pH: x  Gluc: 144 mg/dL / Ketone: x  / Bili: x / Urobili: x   Blood: x / Protein: x / Nitrite: x   Leuk Esterase: x / RBC: x / WBC x   Sq Epi: x / Non Sq Epi: x / Bacteria: x              CULTURES:      Additional results/Imaging, I have personally reviewed:    Telemetry, personally reviewed:
SUBJECTIVE:  Patient seen and examined at bedside. No acute events overnight. Feeling well.   Creatinine and WBC improved this AM. Patient denies any right flank pain. Pending US this AM.     PAST MEDICAL & SURGICAL HISTORY:  HLD (hyperlipidemia)      DM (diabetes mellitus), type 2      Kidney stones      Hypothyroidism      History of staph infection  lumbar spine      CMML (chronic myelomonocytic leukemia)      Bladder stone      Kidney stone      H/O sepsis      History of BPH      History of back surgery      History of lithotripsy      H/O colonoscopy          REVIEW OF SYSTEMS:Same previous    MEDICATIONS  (STANDING):  aspirin enteric coated 81 milliGRAM(s) Oral daily  atorvastatin 10 milliGRAM(s) Oral at bedtime  dextrose 5%. 1000 milliLiter(s) (50 mL/Hr) IV Continuous <Continuous>  dextrose 50% Injectable 25 Gram(s) IV Push once  fenofibrate Tablet 48 milliGRAM(s) Oral daily  glucagon  Injectable 1 milliGRAM(s) IntraMuscular once  insulin lispro (ADMELOG) corrective regimen sliding scale   SubCutaneous three times a day before meals  lactobacillus acidophilus 1 Tablet(s) Oral daily  levothyroxine 112 MICROGram(s) Oral daily  piperacillin/tazobactam IVPB.. 3.375 Gram(s) IV Intermittent every 8 hours  sodium chloride 0.9%. 1000 milliLiter(s) (50 mL/Hr) IV Continuous <Continuous>  tamsulosin 0.8 milliGRAM(s) Oral at bedtime    MEDICATIONS  (PRN):  acetaminophen     Tablet .. 650 milliGRAM(s) Oral every 6 hours PRN Temp greater or equal to 38C (100.4F), Mild Pain (1 - 3)  aluminum hydroxide/magnesium hydroxide/simethicone Suspension 30 milliLiter(s) Oral every 4 hours PRN Dyspepsia  dextrose Oral Gel 15 Gram(s) Oral once PRN Blood Glucose LESS THAN 70 milliGRAM(s)/deciliter  melatonin 3 milliGRAM(s) Oral at bedtime PRN Insomnia  ondansetron Injectable 4 milliGRAM(s) IV Push every 8 hours PRN Nausea and/or Vomiting  oxybutynin 5 milliGRAM(s) Oral every 8 hours PRN urinary urgency      Vital Signs Last 24 Hrs  T(C): 36.7 (13 Apr 2025 07:48), Max: 37.1 (12 Apr 2025 16:59)  T(F): 98 (13 Apr 2025 07:48), Max: 98.8 (12 Apr 2025 16:59)  HR: 74 (13 Apr 2025 07:48) (65 - 81)  BP: 114/66 (13 Apr 2025 07:48) (106/58 - 142/70)  BP(mean): 69 (12 Apr 2025 16:59) (69 - 91)  RR: 16 (13 Apr 2025 07:48) (16 - 18)  SpO2: 99% (13 Apr 2025 07:48) (95% - 100%)    Parameters below as of 13 Apr 2025 07:48  Patient On (Oxygen Delivery Method): room air        General: No distress, No anxiety          Skin     : No jaundice, No lesions, No swelling  HEENT: Normocephalic, no icterus , EOM full , No epistaxis  Lung    : No resp distress  Abdo:   : Soft, Non tender, No guarding, No distension   Back    : No CVAT b/l  Extremity: No calf tenderness   Genitalia Male: rosenberg with ronni urine  Neuro   : A&Ox3    LABS:                        9.3    13.82 )-----------( 115      ( 13 Apr 2025 06:27 )             28.6     04-13    136  |  109[H]  |  25[H]  ----------------------------<  155[H]  3.7   |  20[L]  |  1.82[H]    Ca    8.7      13 Apr 2025 06:27    TPro  8.4[H]  /  Alb  3.8  /  TBili  1.3[H]  /  DBili  x   /  AST  14[L]  /  ALT  21  /  AlkPhos  45  04-12      Urinalysis Basic - ( 13 Apr 2025 06:27 )    Color: x / Appearance: x / SG: x / pH: x  Gluc: 155 mg/dL / Ketone: x  / Bili: x / Urobili: x   Blood: x / Protein: x / Nitrite: x   Leuk Esterase: x / RBC: x / WBC x   Sq Epi: x / Non Sq Epi: x / Bacteria: x      RADIOLOGY & ADDITIONAL STUDIES:  < from: CT Abdomen and Pelvis w/ IV Cont (04.12.25 @ 12:29) >  PROCEDURE DATE:  04/12/2025          INTERPRETATION:  CLINICAL INFORMATION: Difficulty urinating. Recent   cystoscopy and stone removal.    COMPARISON: Renal ultrasound 3/17/2025, CT abdomen/pelvis 6/6/2022    CONTRAST/COMPLICATIONS:  IV Contrast: Omnipaque 350  90 cc administered   0 cc discarded  Oral Contrast: NONE  .    PROCEDURE:  CT of the Abdomen and Pelvis was performed.  Sagittal and coronal reformats were performed.    FINDINGS:  LOWER CHEST: Bibasilar atelectasis. Mild coronary artery calcifications.    LIVER: Within normal limits.  BILE DUCTS: Normal caliber.  GALLBLADDER: Cholelithiasis.  SPLEEN: Within normal limits.  PANCREAS: Within normal limits.  ADRENALS: Within normal limits.  KIDNEYS/URETERS: Mild left and moderate right hydroureteronephrosis.   Interval left ureteral stent extending from an interpolar calyx to the   bladder. Multiple bilateral renal stones measuring up to 6 mm on the left  and 3 mm on the right. Multiple distal right ureteral stones measuring up   to 6 mm. Moderate bilateral perinephric stranding, left greater than   right, nonspecific. Small right renal cyst.    BLADDER: Distended bladder with nondependent gas. 4 mmdependent stone in   the posterior bladder.  REPRODUCTIVE ORGANS: Prostate is not enlarged.    BOWEL: No bowel obstruction. Appendix is not visualized. No evidence of   inflammation in the pericecal region.  PERITONEUM/RETROPERITONEUM: Within normal limits.  VESSELS: Atherosclerotic changes.  LYMPH NODES: No lymphadenopathy.  ABDOMINAL WALL: Small fat-containing umbilical hernia.  BONES: Degenerative changes. Stable chronic mild L2 compression fracture.    IMPRESSION:  Multiple distal right ureteral stones measuring up to 6 mm with moderate   right hydroureteronephrosis. Additional nonobstructive right renal   calculi measuring up to 3 mm.    Mild left hydroureteronephrosis with left ureteral stent in expected   location extending from an interpolar calyx to the bladder. Multiple   additional nonobstructive left renal calculi. No stones seen along the   course of the ureteral stent. 4 mm stone within the bladder.    Distended bladder.    < end of copied text >  
Pt resting in bed. Had some bladder spasm but relieved w/ Ditropan. No other complaints    Vital Signs Last 24 Hrs  T(C): 36.6 (15 Apr 2025 07:33), Max: 36.8 (14 Apr 2025 08:59)  T(F): 97.8 (15 Apr 2025 07:33), Max: 98.3 (14 Apr 2025 17:46)  HR: 62 (15 Apr 2025 07:33) (58 - 69)  BP: 126/75 (15 Apr 2025 07:33) (119/63 - 143/80)  BP(mean): --  RR: 18 (15 Apr 2025 07:33) (18 - 18)  SpO2: 98% (15 Apr 2025 07:33) (97% - 99%)    Parameters below as of 15 Apr 2025 07:33  Patient On (Oxygen Delivery Method): room air        I&O's Summary    14 Apr 2025 07:01  -  15 Apr 2025 07:00  --------------------------------------------------------  IN: 0 mL / OUT: 2575 mL / NET: -2575 mL        Physical Exam  Gen: NAD, A&Ox3  Abd: Soft, NT, ND, No bladder palp  : Adkins in place - no clots                          9.9    9.13  )-----------( 110      ( 14 Apr 2025 06:32 )             29.5       04-14    136  |  107  |  16  ----------------------------<  144[H]  3.6   |  24  |  1.42[H]    Ca    10.0      14 Apr 2025 06:32

## 2025-04-16 ENCOUNTER — NON-APPOINTMENT (OUTPATIENT)
Age: 70
End: 2025-04-16

## 2025-04-21 ENCOUNTER — APPOINTMENT (OUTPATIENT)
Dept: UROLOGY | Facility: CLINIC | Age: 70
End: 2025-04-21
Payer: COMMERCIAL

## 2025-04-21 ENCOUNTER — NON-APPOINTMENT (OUTPATIENT)
Age: 70
End: 2025-04-21

## 2025-04-21 VITALS
RESPIRATION RATE: 16 BRPM | OXYGEN SATURATION: 99 % | HEIGHT: 71 IN | DIASTOLIC BLOOD PRESSURE: 72 MMHG | BODY MASS INDEX: 28.98 KG/M2 | HEART RATE: 72 BPM | WEIGHT: 207 LBS | SYSTOLIC BLOOD PRESSURE: 144 MMHG

## 2025-04-21 DIAGNOSIS — Z79.899 OTHER LONG TERM (CURRENT) DRUG THERAPY: ICD-10-CM

## 2025-04-21 DIAGNOSIS — E11.9 TYPE 2 DIABETES MELLITUS WITHOUT COMPLICATIONS: ICD-10-CM

## 2025-04-21 DIAGNOSIS — N40.1 BENIGN PROSTATIC HYPERPLASIA WITH LOWER URINARY TRACT SYMPTOMS: ICD-10-CM

## 2025-04-21 DIAGNOSIS — T81.43XA INFECTION FOLLOWING A PROCEDURE, ORGAN AND SPACE SURGICAL SITE, INITIAL ENCOUNTER: ICD-10-CM

## 2025-04-21 DIAGNOSIS — C93.10 CHRONIC MYELOMONOCYTIC LEUKEMIA NOT HAVING ACHIEVED REMISSION: ICD-10-CM

## 2025-04-21 DIAGNOSIS — Z79.85 LONG-TERM (CURRENT) USE OF INJECTABLE NON-INSULIN ANTIDIABETIC DRUGS: ICD-10-CM

## 2025-04-21 DIAGNOSIS — N20.0 CALCULUS OF KIDNEY: ICD-10-CM

## 2025-04-21 DIAGNOSIS — R33.8 OTHER RETENTION OF URINE: ICD-10-CM

## 2025-04-21 DIAGNOSIS — E03.9 HYPOTHYROIDISM, UNSPECIFIED: ICD-10-CM

## 2025-04-21 DIAGNOSIS — N17.9 ACUTE KIDNEY FAILURE, UNSPECIFIED: ICD-10-CM

## 2025-04-21 DIAGNOSIS — B95.2 ENTEROCOCCUS AS THE CAUSE OF DISEASES CLASSIFIED ELSEWHERE: ICD-10-CM

## 2025-04-21 DIAGNOSIS — Z79.891 LONG TERM (CURRENT) USE OF OPIATE ANALGESIC: ICD-10-CM

## 2025-04-21 DIAGNOSIS — N13.6 PYONEPHROSIS: ICD-10-CM

## 2025-04-21 DIAGNOSIS — Z79.84 LONG TERM (CURRENT) USE OF ORAL HYPOGLYCEMIC DRUGS: ICD-10-CM

## 2025-04-21 DIAGNOSIS — E78.5 HYPERLIPIDEMIA, UNSPECIFIED: ICD-10-CM

## 2025-04-21 DIAGNOSIS — Z79.82 LONG TERM (CURRENT) USE OF ASPIRIN: ICD-10-CM

## 2025-04-21 PROCEDURE — 52310 CYSTOSCOPY AND TREATMENT: CPT

## 2025-04-24 LAB
CELL MATERIAL STONE EST-MCNT: SIGNIFICANT CHANGE UP
LABORATORY COMMENT REPORT: SIGNIFICANT CHANGE UP
NIDUS STONE QN: SIGNIFICANT CHANGE UP

## 2025-05-22 ENCOUNTER — NON-APPOINTMENT (OUTPATIENT)
Age: 70
End: 2025-05-22

## 2025-05-22 DIAGNOSIS — A49.9 URINARY TRACT INFECTION, SITE NOT SPECIFIED: ICD-10-CM

## 2025-05-22 DIAGNOSIS — N39.0 URINARY TRACT INFECTION, SITE NOT SPECIFIED: ICD-10-CM

## 2025-05-23 LAB
APPEARANCE: CLEAR
BACTERIA: ABNORMAL /HPF
BILIRUBIN URINE: NEGATIVE
BLOOD URINE: ABNORMAL
CAST: 0 /LPF
COLOR: YELLOW
EPITHELIAL CELLS: 1 /HPF
GLUCOSE QUALITATIVE U: NEGATIVE MG/DL
KETONES URINE: NEGATIVE MG/DL
LEUKOCYTE ESTERASE URINE: ABNORMAL
MICROSCOPIC-UA: NORMAL
NITRITE URINE: POSITIVE
PH URINE: 6
PROTEIN URINE: 30 MG/DL
RED BLOOD CELLS URINE: 0 /HPF
SPECIFIC GRAVITY URINE: 1.01
UROBILINOGEN URINE: 0.2 MG/DL
WHITE BLOOD CELLS URINE: 26 /HPF

## 2025-05-27 LAB — BACTERIA UR CULT: ABNORMAL

## 2025-05-27 RX ORDER — SULFAMETHOXAZOLE AND TRIMETHOPRIM 800; 160 MG/1; MG/1
800-160 TABLET ORAL TWICE DAILY
Qty: 14 | Refills: 0 | Status: ACTIVE | COMMUNITY
Start: 2025-05-27 | End: 1900-01-01

## 2025-06-04 ENCOUNTER — OUTPATIENT (OUTPATIENT)
Dept: OUTPATIENT SERVICES | Facility: HOSPITAL | Age: 70
LOS: 1 days | Discharge: HOME | End: 2025-06-04

## 2025-06-04 DIAGNOSIS — Z98.890 OTHER SPECIFIED POSTPROCEDURAL STATES: Chronic | ICD-10-CM

## 2025-06-04 DIAGNOSIS — C93.10 CHRONIC MYELOMONOCYTIC LEUKEMIA NOT HAVING ACHIEVED REMISSION: ICD-10-CM

## 2025-06-06 ENCOUNTER — LABORATORY RESULT (OUTPATIENT)
Age: 70
End: 2025-06-06

## 2025-06-06 ENCOUNTER — APPOINTMENT (OUTPATIENT)
Dept: HEMATOLOGY ONCOLOGY | Facility: CLINIC | Age: 70
End: 2025-06-06

## 2025-06-06 ENCOUNTER — RESULT REVIEW (OUTPATIENT)
Age: 70
End: 2025-06-06

## 2025-06-06 VITALS
TEMPERATURE: 98.3 F | BODY MASS INDEX: 28 KG/M2 | SYSTOLIC BLOOD PRESSURE: 135 MMHG | HEIGHT: 71 IN | HEART RATE: 78 BPM | DIASTOLIC BLOOD PRESSURE: 74 MMHG | OXYGEN SATURATION: 98 % | WEIGHT: 200 LBS

## 2025-06-06 LAB
BASOPHILS # BLD AUTO: 0.12 K/UL — SIGNIFICANT CHANGE UP (ref 0–0.2)
BASOPHILS NFR BLD AUTO: 2.2 % — HIGH (ref 0–2)
EOSINOPHIL # BLD AUTO: 0.04 K/UL — SIGNIFICANT CHANGE UP (ref 0–0.5)
EOSINOPHIL NFR BLD AUTO: 0.7 % — SIGNIFICANT CHANGE UP (ref 0–6)
HCT VFR BLD CALC: 31.8 % — LOW (ref 39–50)
HGB BLD-MCNC: 10.5 G/DL — LOW (ref 13–17)
IMM GRANULOCYTES NFR BLD AUTO: 0.2 % — SIGNIFICANT CHANGE UP (ref 0–0.9)
LYMPHOCYTES # BLD AUTO: 2.18 K/UL — SIGNIFICANT CHANGE UP (ref 1–3.3)
LYMPHOCYTES # BLD AUTO: 39.4 % — SIGNIFICANT CHANGE UP (ref 13–44)
MCHC RBC-ENTMCNC: 29.3 PG — SIGNIFICANT CHANGE UP (ref 27–34)
MCHC RBC-ENTMCNC: 33 G/DL — SIGNIFICANT CHANGE UP (ref 32–36)
MCV RBC AUTO: 88.8 FL — SIGNIFICANT CHANGE UP (ref 80–100)
MONOCYTES # BLD AUTO: 0.81 K/UL — SIGNIFICANT CHANGE UP (ref 0–0.9)
MONOCYTES NFR BLD AUTO: 14.6 % — HIGH (ref 2–14)
NEUTROPHILS # BLD AUTO: 2.37 K/UL — SIGNIFICANT CHANGE UP (ref 1.8–7.4)
NEUTROPHILS NFR BLD AUTO: 42.9 % — LOW (ref 43–77)
NRBC BLD AUTO-RTO: 0 /100 WBCS — SIGNIFICANT CHANGE UP (ref 0–0)
PLATELET # BLD AUTO: 246 K/UL — SIGNIFICANT CHANGE UP (ref 150–400)
RBC # BLD: 3.58 M/UL — LOW (ref 4.2–5.8)
RBC # FLD: 15.3 % — HIGH (ref 10.3–14.5)
WBC # BLD: 5.53 K/UL — SIGNIFICANT CHANGE UP (ref 3.8–10.5)
WBC # FLD AUTO: 5.53 K/UL — SIGNIFICANT CHANGE UP (ref 3.8–10.5)

## 2025-06-06 PROCEDURE — 99214 OFFICE O/P EST MOD 30 MIN: CPT

## 2025-06-10 LAB
ALBUMIN SERPL ELPH-MCNC: 4.6 G/DL
ALP BLD-CCNC: 68 U/L
ALT SERPL-CCNC: 14 U/L
ANION GAP SERPL CALC-SCNC: 16 MMOL/L
AST SERPL-CCNC: 26 U/L
BILIRUB SERPL-MCNC: 0.5 MG/DL
BUN SERPL-MCNC: 26 MG/DL
CALCIUM SERPL-MCNC: 10.9 MG/DL
CHLORIDE SERPL-SCNC: 102 MMOL/L
CO2 SERPL-SCNC: 19 MMOL/L
CREAT SERPL-MCNC: 1.66 MG/DL
EGFRCR SERPLBLD CKD-EPI 2021: 44 ML/MIN/1.73M2
GLUCOSE SERPL-MCNC: 154 MG/DL
LDH SERPL-CCNC: 144 U/L
POTASSIUM SERPL-SCNC: 4 MMOL/L
PROT SERPL-MCNC: 8 G/DL
SODIUM SERPL-SCNC: 137 MMOL/L

## 2025-06-12 ENCOUNTER — APPOINTMENT (OUTPATIENT)
Dept: UROLOGY | Facility: CLINIC | Age: 70
End: 2025-06-12
Payer: COMMERCIAL

## 2025-06-12 PROBLEM — R39.15 URGENCY OF MICTURITION: Status: ACTIVE | Noted: 2025-06-12

## 2025-06-12 PROBLEM — R30.0 DYSURIA: Status: ACTIVE | Noted: 2025-06-12

## 2025-06-12 PROCEDURE — 99214 OFFICE O/P EST MOD 30 MIN: CPT

## 2025-06-13 LAB
APPEARANCE: CLEAR
BACTERIA: ABNORMAL /HPF
BILIRUBIN URINE: NEGATIVE
BLOOD URINE: NEGATIVE
CALCIUM OXALATE CRYSTALS: PRESENT
CAST: 0 /LPF
COLOR: NORMAL
EPITHELIAL CELLS: 4 /HPF
GLUCOSE QUALITATIVE U: NEGATIVE MG/DL
KETONES URINE: NEGATIVE MG/DL
LEUKOCYTE ESTERASE URINE: ABNORMAL
MICROSCOPIC-UA: NORMAL
NITRITE URINE: NEGATIVE
PH URINE: 5.5
PROTEIN URINE: 30 MG/DL
RED BLOOD CELLS URINE: 1 /HPF
REVIEW: NORMAL
SPECIFIC GRAVITY URINE: 1.02
UROBILINOGEN URINE: 0.2 MG/DL
WHITE BLOOD CELLS URINE: 34 /HPF

## 2025-06-17 LAB — BACTERIA UR CULT: ABNORMAL

## 2025-06-17 RX ORDER — NITROFURANTOIN (MONOHYDRATE/MACROCRYSTALS) 25; 75 MG/1; MG/1
100 CAPSULE ORAL
Qty: 14 | Refills: 0 | Status: ACTIVE | COMMUNITY
Start: 2025-06-17 | End: 1900-01-01

## 2025-06-24 ENCOUNTER — APPOINTMENT (OUTPATIENT)
Dept: UROLOGY | Facility: CLINIC | Age: 70
End: 2025-06-24
Payer: COMMERCIAL

## 2025-06-24 VITALS
DIASTOLIC BLOOD PRESSURE: 78 MMHG | WEIGHT: 200 LBS | SYSTOLIC BLOOD PRESSURE: 129 MMHG | BODY MASS INDEX: 28 KG/M2 | HEIGHT: 71 IN | HEART RATE: 68 BPM | OXYGEN SATURATION: 98 %

## 2025-06-24 PROCEDURE — 52000 CYSTOURETHROSCOPY: CPT

## 2025-06-25 DIAGNOSIS — D64.9 ANEMIA, UNSPECIFIED: ICD-10-CM

## 2025-06-27 ENCOUNTER — APPOINTMENT (OUTPATIENT)
Dept: ENDOCRINOLOGY | Facility: CLINIC | Age: 70
End: 2025-06-27
Payer: COMMERCIAL

## 2025-06-27 VITALS
HEART RATE: 76 BPM | OXYGEN SATURATION: 99 % | DIASTOLIC BLOOD PRESSURE: 80 MMHG | WEIGHT: 205 LBS | BODY MASS INDEX: 28.7 KG/M2 | SYSTOLIC BLOOD PRESSURE: 130 MMHG | HEIGHT: 71 IN

## 2025-06-27 PROCEDURE — 99214 OFFICE O/P EST MOD 30 MIN: CPT

## 2025-06-27 PROCEDURE — G2211 COMPLEX E/M VISIT ADD ON: CPT

## 2025-07-07 ENCOUNTER — OFFICE (OUTPATIENT)
Dept: URBAN - METROPOLITAN AREA CLINIC 102 | Facility: CLINIC | Age: 70
Setting detail: OPHTHALMOLOGY
End: 2025-07-07
Payer: COMMERCIAL

## 2025-07-07 DIAGNOSIS — H35.372: ICD-10-CM

## 2025-07-07 DIAGNOSIS — E11.9: ICD-10-CM

## 2025-07-07 DIAGNOSIS — H43.813: ICD-10-CM

## 2025-07-07 DIAGNOSIS — H25.13: ICD-10-CM

## 2025-07-07 DIAGNOSIS — H43.22: ICD-10-CM

## 2025-07-07 PROCEDURE — 92134 CPTRZ OPH DX IMG PST SGM RTA: CPT | Performed by: OPHTHALMOLOGY

## 2025-07-07 PROCEDURE — 92014 COMPRE OPH EXAM EST PT 1/>: CPT | Performed by: OPHTHALMOLOGY

## 2025-07-07 ASSESSMENT — VISUAL ACUITY
OD_BCVA: 20/25-1
OS_BCVA: 20/30-2

## 2025-07-07 ASSESSMENT — REFRACTION_AUTOREFRACTION
OS_CYLINDER: -1.75
OD_CYLINDER: -1.50
OS_SPHERE: +3.00
OS_AXIS: 073
OD_SPHERE: +2.75
OD_AXIS: 098

## 2025-07-07 ASSESSMENT — REFRACTION_MANIFEST
OS_AXIS: 073
OD_ADD: +2.50
OS_VA1: 20/20
OD_SPHERE: +2.75
OD_CYLINDER: -2.00
OD_VA1: 20/20
OS_ADD: +2.50
OD_VA1: 20/20-1
OS_CYLINDER: -1.75
OU_VA: 20/20
OD_AXIS: 095
OD_SPHERE: +2.75
OS_AXIS: 060
OS_SPHERE: +2.50
OS_CYLINDER: -1.75
OD_CYLINDER: -1.50
OD_AXIS: 098
OS_SPHERE: +3.00

## 2025-07-07 ASSESSMENT — REFRACTION_CURRENTRX
OS_VPRISM_DIRECTION: SV
OS_OVR_VA: 20/
OD_SPHERE: +5.00
OD_OVR_VA: 20/
OD_CYLINDER: -2.00
OS_AXIS: 078
OS_CYLINDER: -1.50
OD_AXIS: 092
OD_VPRISM_DIRECTION: SV
OS_SPHERE: +4.75

## 2025-07-07 ASSESSMENT — KERATOMETRY
OD_AXISANGLE_DEGREES: 006
METHOD_AUTO_MANUAL: AUTO
OS_AXISANGLE_DEGREES: 164
OD_K1POWER_DIOPTERS: 42.50
OS_K1POWER_DIOPTERS: 42.50
OS_K2POWER_DIOPTERS: 43.75
OD_K2POWER_DIOPTERS: 43.75

## 2025-07-07 ASSESSMENT — CONFRONTATIONAL VISUAL FIELD TEST (CVF)
OS_FINDINGS: FULL
OD_FINDINGS: FULL

## 2025-07-07 ASSESSMENT — TONOMETRY: OS_IOP_MMHG: 13

## 2025-07-09 ENCOUNTER — APPOINTMENT (OUTPATIENT)
Dept: PHARMACY | Facility: CLINIC | Age: 70
End: 2025-07-09
Payer: SELF-PAY

## 2025-07-09 ENCOUNTER — APPOINTMENT (OUTPATIENT)
Dept: OTOLARYNGOLOGY | Facility: CLINIC | Age: 70
End: 2025-07-09
Payer: COMMERCIAL

## 2025-07-09 VITALS — BODY MASS INDEX: 27.77 KG/M2 | WEIGHT: 205 LBS | HEIGHT: 72 IN

## 2025-07-09 PROCEDURE — 92557 COMPREHENSIVE HEARING TEST: CPT

## 2025-07-09 PROCEDURE — G0268 REMOVAL OF IMPACTED WAX MD: CPT

## 2025-07-09 PROCEDURE — V5267D: CUSTOM

## 2025-07-09 PROCEDURE — 92567 TYMPANOMETRY: CPT

## 2025-07-09 PROCEDURE — V5267C: CUSTOM

## 2025-07-09 PROCEDURE — 99213 OFFICE O/P EST LOW 20 MIN: CPT | Mod: 25

## 2025-07-29 ENCOUNTER — APPOINTMENT (OUTPATIENT)
Dept: UROLOGY | Facility: CLINIC | Age: 70
End: 2025-07-29
Payer: COMMERCIAL

## 2025-07-29 DIAGNOSIS — N20.0 CALCULUS OF KIDNEY: ICD-10-CM

## 2025-07-29 DIAGNOSIS — N99.115 POSTPROCEDURAL FOSSA NAVICULARIS URETHRAL STRICTURE: ICD-10-CM

## 2025-07-29 PROCEDURE — 99214 OFFICE O/P EST MOD 30 MIN: CPT

## 2025-08-15 ENCOUNTER — NON-APPOINTMENT (OUTPATIENT)
Age: 70
End: 2025-08-15

## 2025-08-20 ENCOUNTER — APPOINTMENT (OUTPATIENT)
Dept: DERMATOLOGY | Facility: CLINIC | Age: 70
End: 2025-08-20

## 2025-08-20 DIAGNOSIS — D18.00 HEMANGIOMA UNSPECIFIED SITE: ICD-10-CM

## 2025-08-20 DIAGNOSIS — D22.5 MELANOCYTIC NEVI OF TRUNK: ICD-10-CM

## 2025-08-20 DIAGNOSIS — L82.1 OTHER SEBORRHEIC KERATOSIS: ICD-10-CM

## 2025-08-20 PROCEDURE — 99214 OFFICE O/P EST MOD 30 MIN: CPT

## 2025-09-09 ENCOUNTER — APPOINTMENT (OUTPATIENT)
Dept: UROLOGY | Facility: CLINIC | Age: 70
End: 2025-09-09
Payer: COMMERCIAL

## 2025-09-09 DIAGNOSIS — N21.0 CALCULUS IN BLADDER: ICD-10-CM

## 2025-09-09 DIAGNOSIS — N20.0 CALCULUS OF KIDNEY: ICD-10-CM

## 2025-09-09 DIAGNOSIS — N99.115 POSTPROCEDURAL FOSSA NAVICULARIS URETHRAL STRICTURE: ICD-10-CM

## 2025-09-09 DIAGNOSIS — N40.1 BENIGN PROSTATIC HYPERPLASIA WITH LOWER URINARY TRACT SYMPMS: ICD-10-CM

## 2025-09-09 DIAGNOSIS — N13.8 BENIGN PROSTATIC HYPERPLASIA WITH LOWER URINARY TRACT SYMPMS: ICD-10-CM

## 2025-09-09 PROCEDURE — 99214 OFFICE O/P EST MOD 30 MIN: CPT

## 2025-09-09 PROCEDURE — 51798 US URINE CAPACITY MEASURE: CPT

## 2025-09-10 LAB
APPEARANCE: CLEAR
BACTERIA: NEGATIVE /HPF
BILIRUBIN URINE: NEGATIVE
BLOOD URINE: NEGATIVE
CAST: 0 /LPF
COLOR: YELLOW
EPITHELIAL CELLS: 2 /HPF
GLUCOSE QUALITATIVE U: NEGATIVE MG/DL
KETONES URINE: NEGATIVE MG/DL
LEUKOCYTE ESTERASE URINE: NEGATIVE
MICROSCOPIC-UA: NORMAL
NITRITE URINE: POSITIVE
PH URINE: 6
PROTEIN URINE: 30 MG/DL
RED BLOOD CELLS URINE: 1 /HPF
REVIEW: NORMAL
SPECIFIC GRAVITY URINE: 1.02
UROBILINOGEN URINE: 1 MG/DL
WHITE BLOOD CELLS URINE: 3 /HPF

## 2025-09-12 LAB — BACTERIA UR CULT: NORMAL

## 2025-09-19 ENCOUNTER — APPOINTMENT (OUTPATIENT)
Dept: HEMATOLOGY ONCOLOGY | Facility: CLINIC | Age: 70
End: 2025-09-19

## 2025-09-19 ENCOUNTER — RESULT REVIEW (OUTPATIENT)
Age: 70
End: 2025-09-19

## 2025-09-19 VITALS
DIASTOLIC BLOOD PRESSURE: 76 MMHG | RESPIRATION RATE: 16 BRPM | BODY MASS INDEX: 27.12 KG/M2 | OXYGEN SATURATION: 97 % | HEART RATE: 69 BPM | SYSTOLIC BLOOD PRESSURE: 133 MMHG | WEIGHT: 200.2 LBS | TEMPERATURE: 98.7 F | HEIGHT: 72 IN

## 2025-09-23 LAB
ALBUMIN SERPL ELPH-MCNC: 4.8 G/DL
ALP BLD-CCNC: 48 U/L
ALT SERPL-CCNC: 21 U/L
ANION GAP SERPL CALC-SCNC: 13 MMOL/L
AST SERPL-CCNC: 24 U/L
BILIRUB SERPL-MCNC: 0.9 MG/DL
BUN SERPL-MCNC: 14 MG/DL
CALCIUM SERPL-MCNC: 10.5 MG/DL
CHLORIDE SERPL-SCNC: 103 MMOL/L
CO2 SERPL-SCNC: 22 MMOL/L
CREAT SERPL-MCNC: 1.39 MG/DL
EGFRCR SERPLBLD CKD-EPI 2021: 54 ML/MIN/1.73M2
GLUCOSE SERPL-MCNC: 144 MG/DL
LDH SERPL-CCNC: 189 U/L
POTASSIUM SERPL-SCNC: 4.1 MMOL/L
PROT SERPL-MCNC: 7.7 G/DL
SODIUM SERPL-SCNC: 138 MMOL/L

## (undated) DEVICE — STERIS DEFENDO 3-PIECE KIT (AIR/WATER, SUCTION & BIOPSY VALVES)

## (undated) DEVICE — SENSOR O2 FINGER XL ADULT 24/BX 6BX/CA

## (undated) DEVICE — CATH IV SAFE BC 20G X 1.16" (PINK)

## (undated) DEVICE — CATH IV SAFE BC 22G X 1" (BLUE)

## (undated) DEVICE — NDL INJ SCLERO INTERJECT 23G

## (undated) DEVICE — ELCTR GROUNDING PAD ADULT COVIDIEN

## (undated) DEVICE — SNARE POLYP SENS 27MM 240CM

## (undated) DEVICE — TUBE O2 SUPL CRUSH RESIS CONN SOUTHSIDE ONLY

## (undated) DEVICE — PACK IV START WITH CHG

## (undated) DEVICE — SUT HEWSON RETRIEVER

## (undated) DEVICE — ENDOCUFF VISION SZ 2 LG GRN

## (undated) DEVICE — Device

## (undated) DEVICE — ENDOCUFF VISION SZ 3 SM PRPL

## (undated) DEVICE — MARKER ENDO SPOT EX

## (undated) DEVICE — RETRIEVER ROTH NET PLATINUM-UNIVERSAL

## (undated) DEVICE — TRAP QUICK CATCH  SINGL CHAMBER

## (undated) DEVICE — CANISTER SUCTION 1200CC 10/SL

## (undated) DEVICE — POLY TRAP ETRAP

## (undated) DEVICE — FORCEP RADIAL JAW 4 W NDL 2.4MM 2.8MM 240CM ORANGE DISP

## (undated) DEVICE — TUBING ENDO EXT OLYMPUS 160 24HR USE

## (undated) DEVICE — MASK O2 NON REBREATH 3IN1 ADULT

## (undated) DEVICE — SUCTION YANKAUER TAPERED BULBOUS NO VENT

## (undated) DEVICE — SYR LUER SLIP TIP 30CC

## (undated) DEVICE — TUBING SUCTION CONN 6FT STERILE

## (undated) DEVICE — VALVE BIOPSY

## (undated) DEVICE — SOL IRR POUR H2O 500ML

## (undated) DEVICE — FORCEP RADIAL JAW 4 JUMBO 2.8MM 3.2MM 240CM ORANGE DISP

## (undated) DEVICE — SYR IV POSIFLUSH NS 3ML 30/TY

## (undated) DEVICE — SNARE LRG

## (undated) DEVICE — MASK OXYGEN PANORAMIC

## (undated) DEVICE — FORMALIN CUPS 10% BUFFERED

## (undated) DEVICE — SYR LUER SLIP TIP 50CC

## (undated) DEVICE — BRUSH COLONOSCOPY CYTOLOGY

## (undated) DEVICE — TUBING CANNULA SALTER LABS NASAL ADULT 7FT

## (undated) DEVICE — TUBING IV SET GRAVITY 3Y 100" MACRO

## (undated) DEVICE — TUBING IV SET SECONDARY 34"